# Patient Record
Sex: FEMALE | Race: OTHER | Employment: FULL TIME | URBAN - METROPOLITAN AREA
[De-identification: names, ages, dates, MRNs, and addresses within clinical notes are randomized per-mention and may not be internally consistent; named-entity substitution may affect disease eponyms.]

---

## 2018-01-15 ENCOUNTER — OFFICE VISIT (OUTPATIENT)
Dept: FAMILY MEDICINE CLINIC | Age: 23
End: 2018-01-15

## 2018-01-15 VITALS
HEART RATE: 72 BPM | RESPIRATION RATE: 14 BRPM | OXYGEN SATURATION: 100 % | TEMPERATURE: 98.3 F | WEIGHT: 101 LBS | HEIGHT: 56 IN | SYSTOLIC BLOOD PRESSURE: 122 MMHG | DIASTOLIC BLOOD PRESSURE: 81 MMHG | BODY MASS INDEX: 22.72 KG/M2

## 2018-01-15 DIAGNOSIS — Z3A.16 16 WEEKS GESTATION OF PREGNANCY: ICD-10-CM

## 2018-01-15 DIAGNOSIS — Z23 ENCOUNTER FOR IMMUNIZATION: ICD-10-CM

## 2018-01-15 DIAGNOSIS — Z32.01 PREGNANCY CONFIRMED BY POSITIVE URINE TEST: Primary | ICD-10-CM

## 2018-01-15 LAB
BILIRUB UR QL STRIP: NEGATIVE
GLUCOSE UR-MCNC: NEGATIVE MG/DL
HCG URINE, QL. (POC): POSITIVE
KETONES P FAST UR STRIP-MCNC: NEGATIVE MG/DL
PH UR STRIP: 6 [PH] (ref 4.6–8)
PROT UR QL STRIP: NEGATIVE
SP GR UR STRIP: 1.02 (ref 1–1.03)
UA UROBILINOGEN AMB POC: NORMAL (ref 0.2–1)
URINALYSIS CLARITY POC: NORMAL
URINALYSIS COLOR POC: YELLOW
URINE BLOOD POC: NORMAL
URINE LEUKOCYTES POC: NEGATIVE
URINE NITRITES POC: NEGATIVE
VALID INTERNAL CONTROL?: YES

## 2018-01-15 NOTE — PATIENT INSTRUCTIONS
Gigi el embarazo: Ejercicios  [During Pregnancy: Exercises]  Instrucciones de cuidado  Estos son algunos ejemplos de ejercicios que pueden ayudarle gigi el [facility]. Comience cada ejercicio lentamente. Reduzca la intensidad del ejercicio si Maddy Rimes a sentir dolor. Turner médico o el fisioterapeuta le dirán cuándo puede comenzar con estos ejercicios y cuáles funcionarán mejor para usted. Cómo se hacen los ejercicios  Rotación del pk    1. Siéntese en donna silla firme o póngase de pie derecha. 2. Con la barbilla nivelada, gire la jens hacia la derecha y [place] 15 y 27 segundos. 3. Gire la jens hacia la izquierda y mantenga la posición entre 15 y 27 segundos. 4. Repita de 2 a 4 veces hacia cada lado. Flexión del pk hacia adelante    1. Siéntese en donna silla firme o póngase de pie derecha. 2. Agache la jens hacia adelante. 3. Mantenga la posición entre 15 y 27 segundos. 4. Repita de 2 a 4 veces. Presión con la espalda    1. Coloque los pies entre 10 y 12 pulgadas (25 a 30 cm) de la pared. 2. Apoye la espalda plana en la pared y deslícese hacia abajo hasta que las rodillas estén ligeramente dobladas. 3. Presione la parte baja de la espalda contra la pared contrayendo los músculos del abdomen. 4. Mantenga la posición gigi 6 segundos y luego relaje el abdomen y deslícese hacia [place]. 5. Repita de 8 a 12 veces. Giro completo del cuerpo    1. Siéntese con las piernas cruzadas. 2. Lleve la mano izquierda hacia el pie derecho y coloque la mano derecha al lado para apoyarse. 3. Gire lentamente el torso hacia la derecha. 4. Cambie de dex y gire a la izquierda. 5. Repita de 2 a 4 veces. Balanceo de la pelvis    1. Póngase de dex y [address], y coloque las dex directamente bajo los hombros y [place]. 2. Inhale profundamente. Agache la jens y encorve la espalda hacia arriba, haciendo donna curva con la espalda en forma de letra \"C\".  Constantine Castanon posición gigi donna cuenta de 6.  3. Exhale lentamente y levante nuevamente la jens. Relájese manteniendo recta la espalda (no deje que se curve hacia el piso). Mantenga esta posición gigi donna cuenta de 6.  4. Repita fausto ejercicio 8 veces o según russell nivel de comodidad. Inclinación de la pelvis    Nota: Fausto ejercicio refuerza la parte baja de la espalda y la pelvis. Es para [employer] primeros 4 meses de [facility]. Después de james tiempo no se recomienda acostarse boca arriba, pues puede provocar problemas de flujo de [employer] bebé y en usted. 1. Acuéstese boca arriba. [address]. 3. Tense los músculos del abdomen y de las nalgas (glúteos). 4. Al mismo tiempo, gire suavemente la pelvis [place]. Al hacerlo debe aplanarse la curva de la espalda. 5. Mantenga la posición gigi 6 segundos y luego relájese. 6. Aumente poco a poco el número de inclinaciones que richie cada día, según russell nivel de comodidad. Estiramiento hacia atrás    1. Póngase a gatas con las rodillas separadas de 8 a 10 pulgadas (20 a 25 cm), las dex directamente bajo los hombros, y los brazos y la espalda rectos. 2. Manteniendo los brazos rectos, baje lentamente las nalgas (glúteos) hacia los talones y meta la jens hacia destin rodillas. Mantenga la posición entre 15 y 27 segundos. 3. Regrese lentamente a la posición inicial.  4. Repita de 2 a 4 veces. Inclinación hacia el frente    1. Siéntese cómodamente en donna silla, con los brazos relajados. 2. Dóblese lentamente hacia adelante, dejando que los brazos cuelguen frente a usted. Inclínese todo lo que pueda sin llegar a sentir incomodidad o presión en el abdomen. 3. Mantenga la posición entre 15 y 27 segundos y enderécese lentamente. 4. Repita fausto ejercicio de 2 a 4 veces o según russell nivel de comodidad. Levantamiento de la pierna en posición a gatas    1.  Póngase de dex y rodillas, y coloque las dex directamente bajo los hombros y enderece los brazos. 2. Tense los músculos del abdomen metiendo el ombligo hacia russell columna vertebral. Asegúrese de seguir respirando normalmente y no contenga la respiración. 3. Levante la rodilla izquierda y llévela hacia el codo. 4. Extienda lentamente russell pierna detrás de usted sin enderezarla por completo. Tenga cuidado de no dejar caer la cadera. Evite arquear la espalda. 5. Mantenga la pierna detrás de usted gigi unos 6 segundos. 6. Regrese a la posición inicial.  7. Elijah el mismo ejercicio con la otra pierna. 8. Repita de 8 a 12 veces con cada pierna. Sentarse en posición de yadi    1. Siéntese en el piso. 2. Acérquese los pies al cuerpo mientras cruza los tobillos. 3. Mantenga esta posición tanto tiempo krystin se sienta cómoda. Estiramiento sentada en el suelo    1. Siéntese en el suelo con la espalda recta, las piernas separadas unas 12 pulgadas (30 cm) y los pies relajados hacia afuera. 2. Estire las [employer] james, después enderécese. 3. Estire las dex hacia adelante, después enderécese. 4. Estire las [employer] derecho, después enderécese. 5. Mantenga cada estiramiento de 15 a 30 segundos. 6. Repita de 2 a 4 veces. La atención de seguimiento es donna parte clave de russell tratamiento y seguridad. Asegúrese de hacer y acudir a todas las citas, y llame a russell médico si está teniendo problemas. También es donna buena idea saber los resultados de los exámenes y mantener donna lista de los medicamentos que cyril. ¿Dónde puede encontrar más información en inglés? Princess Wright a DealExplorer.jose d Chaudhryra K605 en la búsqueda para aprender más acerca de \"Gigi el embarazo: Ejercicios. \"   © 9914-2838 Healthwise, Incorporated. Instrucciones de cuidado adaptadas bajo licencia por [employer] (which disclaims liability or warranty for this information). Estas instrucciones de cuidado son para usarlas con russell profesional clínico registrado.  Si tiene preguntas acerca de donna afección médica o de estas instrucciones, pregunte siempre a russell profesional de [employer]. Healthwise, Incorporated niega cualquier garantía o responsabilidad por russell uso de esta información. Versión del contenido: [id number]; Última revisión: 26 enero, 2012              Nutrición gigi el embarazo: Después de la [place]  [Nutrition During Pregnancy: After Your Visit]  Instrucciones de cuidado  Lostant en forma saludable gigi el embarazo es importante para usted y russell bebé. Puede ayudarla a sentirse emi y tener un embarazo y parto exitosos. Gigi el embarazo destin necesidades nutricionales aumentan. Aun si tiene excelentes hábitos alimentarios, russell médico le podría recomendar un multivitamínico para asegurarse de que reciba suficiente levi y ácido fólico.  Muchas mujeres embarazadas se preguntan cuánto deben aumentar de peso. En general, a las mujeres que antes del [facility] tenían un peso saludable, se les recomienda aumentar entre 25 y 28 libras (entre 11 y 16 kg). A las mujeres que tenían sobrepeso antes del [facility], se les suele recomendar que aumenten de 15 a 25 libras (de 7 a 11 kg). A las mujeres cuyo peso estaba debajo del peso normal antes del [facility], se les suele recomendar que aumenten de 28 a 40 libras (de 13 a 18 kg). Colaborará con russell médico para establecer un peso objetivo. Aumentar donna cantidad saludable de peso la ayudará a tener un bebé allyson. La atención de seguimiento es donna parte clave de russell tratamiento y seguridad. Asegúrese de hacer y acudir a todas las citas, y llame a russell médico si está teniendo problemas. También es donna buena idea saber los resultados de los exámenes y mantener donna lista de los medicamentos que cyril. ¿Cómo puede cuidarse en el hogar? · Coma abundantes frutas y verduras. Incluya donna variedad de verduras de hoja poppy oscuro, color naranja y [employer]. · Escoja panes, cereales y pastas de grano entero.  Los panes y las pastas de maren entero, [place] arroz integral y la vika son Radha Stains opciones. · Consuma 4 porciones o más de [place] y [address]. La [employer] o semidescremada, y el yogur y el queso sin grasa o con poca grasa son Radha Stains opciones. Si no puede consumir lácteos, obtenga el calcio que russell cuerpo necesita de productos enriquecidos con calcio, krystin jugo de [employer], [place] de soya y tofu. Entre otras qiu no lácteas de calcio se [employer] verduras de hojas verdes, krystin el brócoli, la col rizada, las hojas de [place], las hojas de nabo, el bok [place] y las coles de [place]. · Si come carne, escoja aquellos tipos que tengan menos grasa. [place] opciones son los bowden [place] de carne y el presley o el pavo sin piel. · No coma tiburón, pez laura, macarela sumit, blanquillo o atún garner. Estos alimentos tienen niveles altos de spencer, que es peligroso para el bebé. Puede comer hasta 12 onzas (340 gramos) de pescado o mariscos que tengan bajo nivel de spencer a la semana. Los camarones, el atún [employer] agua, el salmón enmanuel, el abadejo y el bagre son Radha Stains opciones. · [place] los embutidos, krystin el de [place], el jamón y la [place], a 165°F (75°C) antes de comerlos. [place] disminuye el riesgo de enfermarse a causa de un tipo de bacteria que puede encontrarse en los embutidos. · No coma quesos blandos sin pasteurizar, krystin queso \"brie\", feta, mozzarella fresca y [place]. Estas clases de quesos tienen donna bacteria que puede perjudicar al bebé. · Limite la cafeína. Si enriqueta café o té, no tome más de 1 taza al día. Las bebidas cola también contienen cafeína. · No kiran nada de alcohol. No se ha determinado que ninguna cantidad de alcohol sea baldwin gigi el embarazo. · No se ponga a dieta ni trate de bajar de peso. Por ejemplo, no siga donna dieta baja en carbohidratos. Si tiene sobrepeso al comienzo del [facility], russell médico colaborará con usted para manejar el aumento de [place].   · Infórmele a russell médico sobre todas las vitaminas y los suplementos que tome. ¿Cuándo debe pedir ayuda? Preste especial atención a los cambios en russell cristi y asegúrese de comunicarse con russell médico si tiene algún problema. ¿Dónde puede encontrar más información en inglés? Vaya a DealExplorer.be  Matthew Duck T287 en la búsqueda para aprender más acerca de \"Nutrición gigi el embarazo: Después de la [place]. \"   © 1476-5899 Healthwise, Incorporated. Instrucciones de cuidado adaptadas bajo licencia por Danielle Blake (which disclaims liability or warranty for this information). Estas instrucciones de cuidado son para usarlas con russell profesional clínico registrado. Si tiene preguntas acerca de donna afección médica o de estas instrucciones, pregunte siempre a russell profesional de [employer]. Healthwise, Incorporated niega cualquier garantía o responsabilidad por russell uso de esta información. Versión del contenido: [id number]; Última revisión: 21 marzo, 2012              Precauciones en [place]: Instrucciones de cuidado - [ Pregnancy Precautions: Care Instructions ]  Instrucciones de cuidado    No hay donna manera baldwin de prevenir el trabajo de parto antes de la fecha esperada (trabajo de parto prematuro) o de prevenir la mayoría de otros problemas en el [facility]. [place] hay cosas que puede hacer para aumentar las probabilidades de tener un embarazo saludable. Vaya a destin citas, siga los consejos de russell médico y cuídese. Coma emi y richie ejercicio (si russell médico lo permite). Y asegúrese de patsy abundante agua. La atención de seguimiento es donna parte clave de russell tratamiento y seguridad. Asegúrese de hacer y acudir a todas las citas, y llame a russell médico si está teniendo problemas. También es donna buena idea saber los resultados de los exámenes y mantener donna lista de los medicamentos que cyril. ¿Cómo puede cuidarse en el hogar? · Asegúrese de asistir a las citas prenatales. Russell médico le tomará la presión arterial en cada consulta.  [address] comprobará si tiene proteínas en turner orina. Tanto la presión arterial andrew krystin la presencia de proteínas en la orina son señales de preeclampsia. Esta afección puede ser peligrosa tanto para usted krystin para turner bebé. · Kiran abundantes líquidos, suficientes para que turner orina sea de color amarillo polo o transparente krystin el agua. La deshidratación puede causar contracciones. Si tiene [employer], el corazón o el hígado y tiene que [place]'s líquidos, hable con turner médico antes de aumentar turner consumo. · Notifique a turner médico de inmediato si presenta cualquier síntoma de infección, tales krystin:  ¨ Ardor cuando orina. ¨ Flujo con mal olor de la vagina. ¨ Comezón en la vagina. ¨ Fiebre sin explicación. ¨ Dolor o sensibilidad inusual en el útero o la parte baja del abdomen. · Aliméntese en forma equilibrada. Incluya muchos alimentos que lukas ricos en calcio y levi. ¨ Entre los alimentos ricos en calcio se incluyen la [place], el queso, el yogur, [place] y el brócoli. ¨ Entre los alimentos ricos en levi se incluyen las bessy mckeon, los [place], las aves, los [place], los frijoles, las uvas pasas, el pan de grano integral y las verduras de hojas verdes. · No fume. Si necesita ayuda para dejar de fumar, hable con turner médico sobre programas y medicamentos para dejar de fumar. Estos pueden aumentar destin probabilidades de dejar el hábito para siempre. · No kiran alcohol ni use drogas ilegales. · Siga las instrucciones de turner médico acerca de la [place]. Turner médico le dirá cuánto ejercicio puede hacer. · Pregúntele a turner médico si puede tener [employer]. Si usted está en riesgo de tener trabajo de [place], turner médico podría pedirle que no tenga relaciones sexuales. · [place] precauciones para prevenir las caídas. Noel el embarazo las articulaciones están más sueltas y se tiene menos equilibrio.  Los deportes tales krystin el ciclismo, el esquí o el patinaje en línea pueden aumentar el riesgo de caídas. Y no monte a neelima, katharina en motocicleta, richie clavados, richie esquí acuático, bucee, ni salte en paracaídas mientras está embarazada. · Evite calentarse demasiado. No use saunas ni bañeras de hidromasaje. Evite la exposición al sol en climas calientes por mucho tiempo. [place] acetaminofén (Tylenol) para bajar donna fiebre andrew. · No tome medicamentos de venta charles, productos herbarios ni suplementos sin hablar ventura con russell médico o farmacéutico.  ¿Cuándo debe pedir ayuda? Llame al 911 en cualquier momento que considere que necesita atención de [place]. Por ejemplo, llame si:  ? · Se desmayó (perdió el conocimiento). ? · Tiene sangrado vaginal intenso. ? · Tiene dolor intenso en el vientre o la pelvis. ? · Le sale abundante líquido o gotea de la vagina y sabe o anaid que el cordón umbilical se está saliendo a russell vagina. Si esto sucede, arrodíllese de inmediato, de janna forma que destin nalgas estén más altas que russell jens. [place] disminuirá la presión sobre el cordón umbilical hasta que llegue la [facility]. ?Llame a russell médico ahora mismo o busque atención médica inmediata si:  ? · Tiene señales de preeclampsia, tales krystin:  ¨ Se le hinchan de manera repentina la amee, las dex o los pies. ¨ Problemas nuevos con la visión (krystin oscurecimiento de la visión o visión borrosa). ¨ Dolor de jens intenso. ? · Tiene cualquier sangrado vaginal.   ? · Tiene dolor abdominal o cólicos. ? · Tiene fiebre. ? · Ha tenido contracciones regulares (con o sin dolor) por [place]. [place] significa que tiene 8 o más contracciones en 1 hora o que tiene 4 contracciones o más en 20 minutos después de [place] de posición y patsy líquidos. ? · Tiene donna pérdida repentina de líquido por la vagina. ? · Tiene dolor en la parte baja de la espalda o presión en la pelvis que no desaparece.    ? · Nota que russell bebé ha dejado de moverse o se mueve mucho menos de lo normal.   ?Preste especial atención a los cambios en russell cristi y asegúrese de comunicarse con russell médico si tiene algún problema. ¿Dónde puede encontrar más información en inglés? Camelia rodas http://judah.info/. Breanne Daiw S884 en la búsqueda para aprender más acerca de \"Precauciones en el embarazo: Instrucciones de cuidado - [ Pregnancy Precautions: Care Instructions ]. \"  Revisado: 16 marzo, 2017  Versión del contenido: 11.4  © 8542-9736 Healthwise, Incorporated. Las instrucciones de cuidado fueron adaptadas bajo licencia por [employer] (which disclaims liability or warranty for this information). Si usted tiene [place] afección médica o sobre estas instrucciones, siempre pregunte a russell profesional de cristi. Healthwise, Incorporated niega toda garantía o responsabilidad por russell uso de esta información. Weeks 14 to 18 of Your Pregnancy: Care Instructions  Your Care Instructions    During this time, you may start to \"show,\" so that you look pregnant to people around you. You may also notice some changes in your skin, such as itchy spots on your palms or acne on your face. Your baby is now able to pass urine, and your baby's first stool (meconium) is starting to collect in his or her intestines. Hair is also beginning to grow on your baby's head. At your next visit, between weeks 18 and 20, your doctor may do an ultrasound test. The test allows your doctor to check for certain problems. Your doctor can also tell the sex of your baby. This is a good time to think about whether you want to know whether your baby is a boy or a girl. Talk to your doctor about getting a flu shot to help keep you healthy during your pregnancy. As your pregnancy moves along, it is common to worry or feel anxious. Your body is changing a lot. And you are thinking about giving birth, the health of your baby, and becoming a parent. You can learn to cope with any anxiety and stress you feel.   Follow-up care is a key part of your treatment and safety. Be sure to make and go to all appointments, and call your doctor if you are having problems. It's also a good idea to know your test results and keep a list of the medicines you take. How can you care for yourself at home? ?Reduce stress  ? · Ask for help with cooking and housekeeping. ? · Figure out who or what causes your stress. Avoid these people or situations as much as possible. ? · Relax every day. Taking 10- to 15-minute breaks can make a big difference. Take a walk, listen to music, or take a warm bath. ? · Learn relaxation techniques at prenatal or yoga class. Or buy a relaxation tape. ? · List your fears about having a baby and becoming a parent. Share the list with someone you trust. Decide which worries are really small, and try to let them go. Exercise  ? · If you did not exercise much before pregnancy, start slowly. Walking is best. Marjean Aschoff yourself, and do a little more every day. ? · Brisk walking, easy jogging, low-impact aerobics, water aerobics, and yoga are good choices. Some sports, such as scuba diving, horseback riding, downhill skiing, gymnastics, and water skiing, are not a good idea. ? · Try to do at least 2½ hours a week of moderate exercise, such as a fast walk. One way to do this is to be active 30 minutes a day, at least 5 days a week. It's fine to be active in blocks of 10 minutes or more throughout your day and week. ? · Wear loose clothing. And wear shoes and a bra that provide good support. ? · Warm up and cool down to start and finish your exercise. ? · If you want to use weights, be sure to use light weights. They reduce stress on your joints. ?Stay at the best weight for you  ? · Experts recommend that you gain about 1 pound a month during the first 3 months of your pregnancy. ? · Experts recommend that you gain about 1 pound a week during your last 6 months of pregnancy, for a total weight gain of 25 to 35 pounds.    ? · If you are underweight, you will need to gain more weight (about 28 to 40 pounds). ? · If you are overweight, you may not need to gain as much weight (about 15 to 25 pounds). ? · If you are gaining weight too fast, use common sense. Exercise every day, and limit sweets, fast foods, and fats. Choose lean meats, fruits, and vegetables. ? · If you are having twins or more, your doctor may refer you to a dietitian. Where can you learn more? Go to http://radha-doug.info/. Enter B901 in the search box to learn more about \"Weeks 14 to 18 of Your Pregnancy: Care Instructions. \"  Current as of: March 16, 2017  Content Version: 11.4  © 9555-2143 [employer]. Care instructions adapted under license by [employer] (which disclaims liability or warranty for this information). If you have questions about a medical condition or this instruction, always ask your healthcare professional. [place] [address] any warranty or liability for your use of this information. Exercise During Pregnancy: Care Instructions  Your Care Instructions    Exercise is good for healthy pregnant women. It can relieve back pain, swelling, and other discomforts of pregnancy. It also prepares your muscles for childbirth. And exercise can improve your energy level and help you sleep better. If your doctor recommends it, get more exercise. Walking is a good choice. Bit by bit, increase the amount you walk every day. Try for at least 30 minutes on most days of the week. But if you do not already exercise, be sure to talk with your doctor before you start a new exercise program. Try exercise classes for pregnant women. Doctors do not recommend contact sports during pregnancy. Follow-up care is a key part of your treatment and safety. Be sure to make and go to all appointments, and call your doctor if you are having problems.  It's also a good idea to know your test results and keep a list of the medicines you take. How can you care for yourself at home? · Talk with your doctor about the right kind of exercise for each stage of pregnancy. · Listen to your body to know if your exercise is at a safe level. ¨ Do not become overheated while you exercise. ¨ If you feel tired, take it easy. You might walk instead of run. ¨ If you are used to strenuous exercise, pay attention to changes in your body that mean it is time to slow down. ¨ High body temperature can be harmful to your baby. So if you want to use a sauna or hot tub, be sure to talk to your doctor about how to use them safely. · If you exercised before getting pregnant, you should be able to keep up your routine early in your pregnancy. That might include running and aerobics. Later, you may want to switch to swimming or walking. · Eat a small snack or drink juice 15 to 30 minutes before you exercise. · Eat a healthy diet. Make sure it includes plenty of beans, peas, and leafy green vegetables. You may need to increase how much you eat to get extra energy for exercise. · Drink plenty of fluids before, during, and after exercise. · Avoid contact sports, such as soccer and basketball. Also avoid scuba diving, exercise in high altitude (above 6,000 feet), and horseback riding. · Do not get overtired while you exercise. You should be able to talk while you work out. · After your fourth month of pregnancy, avoid exercises (such as sit-ups and some yoga poses) that require you to lie flat on your back on a hard surface. · Try swimming and brisk walking during all your pregnancy. · Get plenty of rest. You may be very tired while you are pregnant. Where can you learn more? Go to http://radha-doug.info/. Enter E551 in the search box to learn more about \"Exercise During Pregnancy: Care Instructions. \"  Current as of: March 16, 2017  Content Version: 11.4  © 1804-9078 Healthwise, Incorporated.  Care instructions adapted under license by [address] (which disclaims liability or warranty for this information). If you have questions about a medical condition or this instruction, always ask your healthcare professional. [address] any warranty or liability for your use of this information.

## 2018-01-15 NOTE — PROGRESS NOTES
Subjective:   David Peres is a 23 y.o. female who is being seen today for possible pregnancy due to missed menses. LMP 9/24/17. Hx of irregular menses according to patient. She has never been pregnant. She has been sexually active with her boyfriend of 3 years. Not using contraception. This would be an unplanned pregnancy. Pt does not use tobacco products, no EtOH, no drug use. She has a reported healthy diet. Does not drink water regularly, perhaps 3 cups at best.     She reports no medical hx or taking any medications. Negative family hx. Not taking prenatal vitaims    Allergies- reviewed: Allergies   Allergen Reactions    Penicillins Hives         Medications- reviewed:   Current Outpatient Prescriptions   Medication Sig    prenatal vit-calcium-iron-fa (PRENATAL PLUS WITH CALCIUM) 27 mg iron- 1 mg tab Take 1 Tab by mouth daily. No current facility-administered medications for this visit. Past Medical History- reviewed:  History reviewed. No pertinent past medical history. Past Surgical History- reviewed:   History reviewed. No pertinent surgical history. Social History- reviewed:  Social History     Social History    Marital status: SINGLE     Spouse name: N/A    Number of children: N/A    Years of education: N/A     Occupational History    Not on file.      Social History Main Topics    Smoking status: Never Smoker    Smokeless tobacco: Never Used    Alcohol use No    Drug use: No    Sexual activity: Yes     Other Topics Concern    Not on file     Social History Narrative    No narrative on file         Immunizations- reviewed:   Immunization History   Administered Date(s) Administered    Influenza Vaccine (Quad) PF 01/15/2018     Flu: will get today     ROS:  General: No fevers or chills  GI: No abdominal pain, nausea, vomiting  : No vaginal bleeding      Objective:     Visit Vitals    /81 (BP 1 Location: Right arm, BP Patient Position: Sitting)    Pulse 72    Temp 98.3 °F (36.8 °C) (Oral)    Resp 14    Ht 4' 8\" (1.422 m)    Wt 101 lb (45.8 kg)    LMP 09/20/2017    SpO2 100%    BMI 22.64 kg/m2       Physical Exam:  GENERAL APPEARANCE: alert, well appearing, in no apparent distress  HEAD: normocephalic, atraumatic  LUNGS: clear to auscultation, no wheezes, rales or rhonchi, symmetric air entry  HEART: regular rate and rhythm, no murmurs  ABDOMEN: fundus soft, nontender 16 cm and   BACK: no CVA tenderness    Labs:  Urine pregnancy test reviewed  Recent Results (from the past 12 hour(s))   AMB POC URINE PREGNANCY TEST, VISUAL COLOR COMPARISON    Collection Time: 01/15/18  8:15 AM   Result Value Ref Range    VALID INTERNAL CONTROL POC Yes     HCG urine, Ql. (POC) Negative Negative   AMB POC URINALYSIS DIP STICK AUTO W/O MICRO    Collection Time: 01/15/18  9:19 AM   Result Value Ref Range    Color (UA POC) Yellow     Clarity (UA POC) Slightly Cloudy     Glucose (UA POC) Negative Negative    Bilirubin (UA POC) Negative Negative    Ketones (UA POC) Negative Negative    Specific gravity (UA POC) 1.025 1.001 - 1.035    Blood (UA POC) Trace Negative    pH (UA POC) 6.0 4.6 - 8.0    Protein (UA POC) Negative Negative    Urobilinogen (UA POC) 1 mg/dL 0.2 - 1    Nitrites (UA POC) Negative Negative    Leukocyte esterase (UA POC) Negative Negative         Assessment:       ICD-10-CM ICD-9-CM    1. Pregnancy confirmed by positive urine test Z32.01 V72.42 AMB POC URINE PREGNANCY TEST, VISUAL COLOR COMPARISON      INFLUENZA VIRUS VAC QUAD,SPLIT,PRESV FREE SYRINGE IM      prenatal vit-calcium-iron-fa (PRENATAL PLUS WITH CALCIUM) 27 mg iron- 1 mg tab      AMB POC URINALYSIS DIP STICK AUTO W/O MICRO   2.  Encounter for immunization Z23 V03.89 AMB POC URINE PREGNANCY TEST, VISUAL COLOR COMPARISON      INFLUENZA VIRUS VAC QUAD,SPLIT,PRESV FREE SYRINGE IM      prenatal vit-calcium-iron-fa (PRENATAL PLUS WITH CALCIUM) 27 mg iron- 1 mg tab      AMB POC URINALYSIS DIP STICK AUTO W/O MICRO   3. 16 weeks gestation of pregnancy Z3A.16 V22.2 AMB POC URINE PREGNANCY TEST, VISUAL COLOR COMPARISON      INFLUENZA VIRUS VAC QUAD,SPLIT,PRESV FREE SYRINGE IM      prenatal vit-calcium-iron-fa (PRENATAL PLUS WITH CALCIUM) 27 mg iron- 1 mg tab      AMB POC URINALYSIS DIP STICK AUTO W/O MICRO       Pregnancy in 22 yo  at 12 and 1/7 weeks by LMP. COREY: 2018. Plan:   · Return to clinic in 4 days(s) with Dr. Brittnee Ramachandran (pt prefers a female physician) for initial prenatal visit and perhaps dating US. I was unable to schedule this pt for dating US for tomorrow as schedule is full. · Prenatal vitamins script given  · Influenza vaccine given  · Precautions given regarding proper water intake and pregnancy. Orders Placed This Encounter    INFLUENZA VIRUS VACCINE QUADRIVALENT, PRESERVATIVE FREE SYRINGE ([id number])    AMB POC URINE PREGNANCY TEST, VISUAL COLOR COMPARISON    AMB POC URINALYSIS DIP STICK AUTO W/O MICRO    prenatal vit-calcium-iron-fa (PRENATAL PLUS WITH CALCIUM) 27 mg iron- 1 mg tab     Sig: Take 1 Tab by mouth daily. Dispense:  30 Tab     Refill:  11         I have discussed the diagnosis with the patient and the intended plan as seen in the above orders. The patient verbalized understanding of the treatment plan and agrees with the plan. The patient has received an after-visit summary and questions were answered concerning future plans. I have discussed medication side effects and warnings with the patient as well. Informed pt to return to the office or go to the ER if she experiences vaginal bleeding, vaginal discharge, leaking of fluid, pelvic cramping.     Discussed pt with Dr. Hayden Busch MD      Unknown DO [place]  Family Medicine Resident

## 2018-01-15 NOTE — MR AVS SNAPSHOT
Visit Information Dalila Bingham Personal Médico Departamento Teléfono del Dep. Número de visita 1/15/2018 10:45 AM Donavan Alfaro, DO [facility] [phone] [id number] Your Appointments 1/15/2018 10:45 AM  
New Patient with Donavan Alfaro, DO [facility] ([facility]) Appt Note: NP ok, per Philip Uribe, confirm pregnancy [facility]0 [facility] [place] [address] [facility]  
[phone]  
  
   
 [address] [place] [address] 3 [facility] [phone] [id number] Upcoming Health Maintenance Date Due Hepatitis A Peds Age 1-18 (1 of 2 - Standard Series) 12/24/1998 DTaP/Tdap/Td series (1 - Tdap) 12/24/2004 HPV AGE [id number] (1 of 3 - Female 3 Dose Series) 12/24/2008 Influenza Age 5 to Adult 8/1/2017 Alergias  Review Complete El: 1/15/2018 Por: Sandrine Quezada A partir del:  1/15/2018 Intensidad Anotado Tipo de reacción [employer] Penicillins  01/15/2018    Hives Vacunas actuales Veliaclark Vazquez Influenza Vaccine (Quad) PF  Incomplete No revisadas esta visita You Were Diagnosed With   
  
 [place] Encounter for immunization    -  Primary ICD-10-CM: V62 ICD-9-CM: V03.89 Partes vitales PS Pulso Temperatura Resp Hurley ( percentil de crecimiento) Peso (percentil de crecimiento) 122/81 (BP 1 Location: Right arm, BP Patient Position: Sitting) 72 98.3 °F (36.8 °C) (Oral) 14 4' 8\" (1.422 m) 101 lb (45.8 kg) LMP (última chadwick) SpO2 BMI (IMC) Estado obstétrico Estatus de tabaquísmo 09/20/2017 100% 22.64 kg/m2 Pregnant Never Smoker Historial de signos vitales BMI and BSA Data Body Mass Index Body Surface Area  
 22.64 kg/m 2 1.35 m 2 Turner lista de medicamentos actualizada Aviso  As of 1/15/2018  9:42 AM  
 No se le ha recetado ningún medicamento. Hicimos lo siguiente AMB POC URINE PREGNANCY TEST, VISUAL COLOR COMPARISON [42317 CPT(R)] INFLUENZA VIRUS VAC QUAD,SPLIT,PRESV FREE SYRINGE IM [id number] CPT(R)] Instrucciones para el Paciente Gigi el embarazo: Ejercicios [During Pregnancy: Exercises] Instrucciones de cuidado Estos son Savannah Gault de ejercicios que pueden ayudarle gigi el [facility]. Comience cada ejercicio lentamente. Reduzca la intensidad del ejercicio si Ingrid Donate a sentir dolor. Turner médico o el fisioterapeuta le dirán cuándo puede comenzar con estos ejercicios y cuáles funcionarán mejor para usted. Cómo se hacen los ejercicios Rotación del pk 1. Siéntese en donna silla firme o póngase de pie derecha. 2. Con la barbilla nivelada, gire la jens hacia la derecha y Mc 15 y 27 segundos. 3. Gire la jens hacia la izquierda y mantenga la posición entre 15 y 27 segundos. 4. Repita de 2 a 4 veces hacia cada lado. Flexión del pk hacia adelante 1. Siéntese en donna silla firme o póngase de pie derecha. 2. Agache la jens hacia adelante. 3. Mantenga la posición entre 15 y 27 segundos. 4. Repita de 2 a 4 veces. Presión con Jadonalybobo Carlita 1. Coloque los pies entre 10 y 12 pulgadas (25 a 30 cm) de la pared. 2. Apoye la espalda plana en la pared y deslícese hacia abajo hasta que las rodillas estén ligeramente dobladas. 3. Presione la parte baja de la espalda contra la pared contrayendo los músculos del abdomen. 4. Mantenga la posición gigi 6 segundos y luego relaje el abdomen y deslícese hacia [place]. 5. Repita de 8 a 12 veces. Giro completo del cuerpo 1. Siéntese con las piernas cruzadas. 2. Lleve la mano izquierda hacia el pie derecho y coloque la mano derecha al lado para apoyarse. 3. Gire lentamente el torso hacia la derecha. 4. Cambie de dex y gire a la izquierda. 5. Repita de 2 a 4 veces. Balanceo de la pelvis 1.  Póngase de dex y rodillas, y coloque las dex directamente bajo los hombros y [place]. 2. Inhale profundamente. Agache la jens y encorve la espalda hacia arriba, haciendo donna curva con la espalda en forma de letra \"C\". Mantenga esta posición gigi donna cuenta de 6. 
3. Exhale lentamente y levante nuevamente la jens. Relájese manteniendo recta la espalda (no deje que se curve hacia el piso). Mantenga esta posición gigi donna cuenta de 6. 
4. Repita fausto ejercicio 8 veces o según russell nivel de comodidad. Inclinación de la pelvis Nota: Fausto ejercicio refuerza la parte baja de la espalda y la pelvis. Es para [employer] primeros 4 meses de [facility]. Después de james tiempo no se recomienda acostarse boca arriba, pues puede provocar problemas de flujo de [employer] bebé y en usted. 1. Acuéstese boca arriba. [address]. 3. Tense los músculos del abdomen y de las nalgas (glúteos). 4. Al mismo tiempo, gire suavemente la pelvis Casper Jah. Al hacerlo debe aplanarse la curva de la espalda. 5. Mantenga la posición gigi 6 segundos y luego relájese. 6. Aumente poco a poco el número de inclinaciones que richie cada día, según russell nivel de comodidad. Estiramiento hacia atrás 1. Póngase a gatas con las rodillas separadas de 8 a 10 pulgadas (20 a 25 cm), las dex directamente bajo los hombros, y los brazos y la espalda rectos. 2. Manteniendo los brazos rectos, baje lentamente las nalgas (glúteos) hacia los talones y meta la jens hacia destin rodillas. Mantenga la posición entre 15 y 27 segundos. 3. Regrese lentamente a la posición inicial. 
4. Repita de 2 a 4 veces. Inclinación hacia el frente 1. Siéntese cómodamente en donna silla, con los brazos relajados. 2. Dóblese lentamente hacia adelante, dejando que los brazos cuelguen frente a usted. Inclínese todo lo que pueda sin llegar a sentir incomodidad o presión en el abdomen. 3. Mantenga la posición entre 15 y 27 segundos y enderécese lentamente. 4. Repita fausto ejercicio de 2 a 4 veces o según russell nivel de comodidad. Levantamiento de la pierna en posición a gatas 1. Póngase de dex y rodillas, y coloque las dex directamente bajo los hombros y [employer] brazos. 2. Tense los músculos del abdomen metiendo el ombligo hacia russell columna vertebral. Asegúrese de seguir respirando normalmente y no contenga la respiración. 3. Levante la rodilla izquierda y llévela hacia el codo. 4. Extienda lentamente russell pierna detrás de usted sin enderezarla por completo. Tenga cuidado de no dejar caer la cadera. Evite arquear la espalda. 5. Mantenga la pierna detrás de usted gigi unos 6 segundos. 6. Regrese a la posición inicial. 
7. Elijah el mismo ejercicio con la otra pierna. 8. Repita de 8 a 12 veces con cada pierna. Sentarse en posición de yadi 1. Siéntese en el piso. 2. Acérquese los pies al cuerpo mientras cruza los tobillos. 3. Mantenga esta posición tanto tiempo krystin se sienta cómoda. Estiramiento sentada en el suelo 1. Siéntese en el suelo con la espalda recta, las piernas separadas unas 12 pulgadas (30 cm) y los pies relajados hacia afuera. 2. Estire las [employer] james, después enderécese. 3. Estire las dex hacia adelante, después enderécese. 4. Estire las [employer] derecho, después enderécese. 5. Mantenga cada estiramiento de 15 a 30 segundos. 6. Repita de 2 a 4 veces. La atención de seguimiento es donna parte clave de russell tratamiento y seguridad. Asegúrese de hacer y acudir a todas las citas, y llame a russell médico si está teniendo problemas. También es donna buena idea saber los resultados de los exámenes y mantener donna lista de los medicamentos que cyril. Dónde puede encontrar más información en inglés? Everardo Ogles a DealExplorer.be Alina Knife C990 en la búsqueda para aprender más acerca de \"Gigi el embarazo: Ejercicios. \"  
© 7491-5513 Healthwise, Incorporated.  Instrucciones de cuidado adaptadas bajo licencia por Jose Luis Robles (which disclaims liability or warranty for this information). Estas instrucciones de cuidado son para usarlas con russell profesional clínico registrado. Si tiene preguntas acerca de donna afección médica o de estas instrucciones, pregunte siempre a russell profesional de [employer]. Healthwise, Incorporated niega cualquier garantía o responsabilidad por russell uso de esta información. Versión del contenido: [id number]; Última revisión: 26 enero, 2012 Nutrición gigi el embarazo: Después de la Amelia [Nutrition During Pregnancy: After Your Visit] Instrucciones de cuidado [place] en forma saludable gigi el embarazo es importante para usted y russell bebé. Puede ayudarla a sentirse emi y tener un embarazo y parto exitosos. Gigi el embarazo destin necesidades nutricionales aumentan. Aun si tiene excelentes hábitos alimentarios, russell médico le podría recomendar un multivitamínico para asegurarse de que reciba suficiente levi y ácido fólico. 
Muchas mujeres embarazadas se preguntan cuánto deben aumentar de peso. En general, a las mujeres que antes del [facility] tenían un peso saludable, se les recomienda aumentar entre 25 y 28 libras (entre 11 y 16 kg). A las mujeres que tenían sobrepeso antes del [facility], se les suele recomendar que aumenten de 15 a 25 libras (de 7 a 11 kg). A las mujeres cuyo peso estaba debajo del peso normal antes del [facility], se les suele recomendar que aumenten de 28 a 40 libras (de 13 a 18 kg). Colaborará con russell médico para establecer un peso objetivo. Aumentar donna cantidad saludable de peso la ayudará a tener un bebé allyson. La atención de seguimiento es donna parte clave de russell tratamiento y seguridad. Asegúrese de hacer y acudir a todas las citas, y llame a russell médico si está teniendo problemas. También es donna buena idea saber los resultados de los exámenes y mantener donna lista de los medicamentos que cyril. Cómo puede cuidarse en el hogar? · Coma abundantes frutas y verduras. Incluya donna variedad de verduras de hoja poppy oscuro, color naranIDRI (Infectious Disease Research Institute) y [employer]. · Escoja panes, cereales y pastas de grano entero. Los panes y las pastas de maren entero, el arroz integral y la vika son Radha Stains opciones. · Consuma 4 porciones o más de [place] y [address] [place]. La [employer] o semidescremada, y el yogur y el queso sin grasa o con poca grasa son Radha Stains opciones. Si no puede consumir lácteos, obtenga el calcio que russell cuerpo necesita de productos enriquecidos con calcio, krystin jugo de [place], [place] de soya y tofu. Entre otras qiu no lácteas de calcio se [employer] verduras de hojas verdes, krystin el brócoli, la col rizada, las hojas de [place], las hojas de nabo, el bok Amelia y las coles de [place]. · Si come carne, escoja aquellos tipos que tengan menos grasa. [place] Johny opciones son los bowden [place] de carne y el presley o el pavo sin piel. · No coma tiburón, pez laura, macarela sumit, blanquillo o atún garner. Estos alimentos tienen niveles altos de spencer, que es peligroso para el bebé. Puede comer hasta 12 onzas (340 gramos) de pescado o mariscos que tengan bajo nivel de spencer a la semana. Los camarones, el atún [employer] agua, el salmón enmanuel, el abadejo y el bagre son Radha Stains opciones. · Conejos los embutidos, krystin el de Chris, el jamón y la [place], a 165°F (75°C) antes de comerlos. [place] disminuye el riesgo de enfermarse a causa de un tipo de bacteria que puede encontrarse en los embutidos. · No coma quesos blandos sin pasteurizar, krystin queso \"brie\", feta, mozzarella fresca y Kay. Estas clases de quesos tienen donna bacteria que puede perjudicar al bebé. · Limite la cafeína. Si enriqueta café o té, no tome más de 1 taza al día. Las bebidas cola también contienen cafeína. · No kiran nada de alcohol. No se ha determinado que ninguna cantidad de alcohol sea baldwin gigi el embarazo. · No se ponga a dieta ni trate de bajar de peso. Por ejemplo, no siga donna dieta baja en carbohidratos. Si tiene sobrepeso al comienzo del [facility], russell médico colaborará con usted para manejar el aumento de [place]. · Infórmele a russell médico sobre todas las vitaminas y los suplementos que tome. Cuándo debe pedir ayuda? Preste especial atención a los cambios en russell cristi y asegúrese de comunicarse con russell médico si tiene algún problema. Dónde puede encontrar más información en inglés? Everardo Gonzales a DealExplorer.be Alina Green V255 en la búsqueda para aprender más acerca de \"Nutrición gigi el embarazo: Después de la Amelia. \"  
© 0301-8199 Healthwise, Incorporated. Instrucciones de cuidado adaptadas bajo licencia por [facility] (which disclaims liability or warranty for this information). Estas instrucciones de cuidado son para usarlas con russell profesional clínico registrado. Si tiene preguntas acerca de donna afección médica o de estas instrucciones, pregunte siempre a russell profesional de [employer]. Healthwise, Incorporated niega cualquier garantía o responsabilidad por russell uso de esta información. Versión del contenido: [id number]; Última revisión: 20 marzo, 2012 Precauciones en el embarazo: Instrucciones de cuidado - [ Pregnancy Precautions: Care Instructions ] Instrucciones de cuidado No hay donna manera baldwin de prevenir el trabajo de parto antes de la fecha esperada (trabajo de parto prematuro) o de prevenir la mayoría de otros problemas en el [facility]. Tripp hay cosas que puede hacer para aumentar las probabilidades de tener un embarazo saludable. Vaya a destin citas, siga los consejos de russell médico y cuídese. Coma emi y richie ejercicio (si russell médico lo permite). Y asegúrese de patsy abundante agua. La atención de seguimiento es donna parte clave de russell tratamiento y seguridad.  Asegúrese de hacer y acudir a todas las citas, y llame a russell médico si está teniendo problemas. También es donna buena idea saber los resultados de los exámenes y mantener donna lista de los medicamentos que cyril. Cómo puede cuidarse en el hogar? · Asegúrese de asistir a las citas prenatales. Russell médico le tomará la presión arterial en cada consulta. Russell médico también comprobará si tiene proteínas en russell orina. Tanto la presión arterial andrew krystin la presencia de proteínas en la orina son señales de preeclampsia. Esta afección puede ser peligrosa tanto para usted krystin para russell bebé. · Mary abundantes líquidos, suficientes para que russell orina sea de color amarillo polo o transparente krystin el agua. La deshidratación puede causar contracciones. Si tiene [employer], el corazón o el hígado y tiene que Grayson's líquidos, hable con russell médico antes de aumentar russell consumo. · Notifique a russell médico de inmediato si presenta cualquier síntoma de infección, tales krystin: ¨ Ardor cuando orina. ¨ Flujo con mal olor de la vagina. ¨ Comezón en la vagina. ¨ Fiebre sin explicación. ¨ Dolor o sensibilidad inusual en el útero o la parte baja del abdomen. · Aliméntese en forma equilibrada. Incluya muchos alimentos que lukas ricos en calcio y levi. ¨ Entre los alimentos ricos en calcio se incluyen la AT[employer], el queso, el yogur, Karin Priestly y el brócoli. ¨ Entre los alimentos ricos en levi se incluyen las bessy mckeon, los [place], las aves, los [place], los frijoles, las uvas pasas, el pan de grano integral y las verduras de hojas verdes. · No fume. Si necesita ayuda para dejar de fumar, hable con russell médico sobre programas y medicamentos para dejar de fumar. Estos pueden aumentar destin probabilidades de dejar el hábito para siempre. · No mary alcohol ni use drogas ilegales. · Siga las instrucciones de russell médico acerca de la [place]. Russell médico le dirá cuánto ejercicio puede hacer. · Pregúntele a russell médico si puede tener [address] [place] [address] [address]  Si usted [employer] en riesgo de tener trabajo de parto prematuro, russell médico podría pedirle que no tenga [employer]. · Gray precauciones para prevenir las caídas. Noel el embarazo las articulaciones están más sueltas y se tiene menos equilibrio. Los deportes tales krystin el ciclismo, el esquí o el patinaje en línea pueden aumentar el riesgo de caídas. Y no monte a neelima, katharina en motocicleta, richie clavados, richie esquí acuático, bucee, ni salte en paracaídas mientras está embarazada. · Evite calentarse demasiado. No use saunas ni bañeras de hidromasaje. Evite la exposición al sol en climas calientes por mucho tiempo. Gray acetaminofén (Tylenol) para bajar donna fiebre andrew. · No tome medicamentos de venta charles, productos herbarios ni suplementos sin hablar ventura con russell médico o farmacéutico. 

Cuándo debe pedir ayuda? Llame al 911 en cualquier momento que considere que necesita atención de [place]. Por ejemplo, llame si: 
? · Se desmayó (perdió el conocimiento). ? · Tiene sangrado vaginal intenso. ? · Tiene dolor intenso en el vientre o la pelvis. ? · Le sale abundante líquido o gotea de la vagina y sabe o anaid que el cordón umbilical se está saliendo a russell vagina. Si esto sucede, arrodíllese de inmediato, de janna forma que destin nalgas estén más altas que russell jens. [place] disminuirá la presión sobre el cordón umbilical hasta que llegue la [facility]. ?Llame a russell médico ahora mismo o busque atención médica inmediata si: 
? · Tiene señales de preeclampsia, tales krystin: ¨ Se le hinchan de manera repentina la amee, las dex o los pies. ¨ Problemas nuevos con la visión (krystin oscurecimiento de la visión o visión borrosa). ¨ Dolor de jens intenso. ? · Tiene cualquier sangrado vaginal.  
? · Tiene dolor abdominal o cólicos. ? · Tiene fiebre. ? · Ha tenido contracciones regulares (con o sin dolor) por [place].  [place] significa que tiene 8 o más contracciones en 1 hora o que tiene 4 contracciones o más en 20 minutos después de cambiar de posición y patsy líquidos. ? · Tiene donna pérdida repentina de líquido por la vagina. ? · Tiene dolor en la parte baja de la espalda o presión en la pelvis que no desaparece. ? · Nota que russell bebé ha dejado de moverse o se mueve mucho menos de lo normal. ?Preste especial atención a los cambios en russell cristi y asegúrese de comunicarse con russell médico si tiene algún problema. Dónde puede encontrar más información en inglés? Robin Dimas a http://radha-doug.info/. Giselle Bond X520 en la búsqueda para aprender más acerca de \"Precauciones en el embarazo: Instrucciones de cuidado - [ Pregnancy Precautions: Care Instructions ]. \" 
Revisado: 16 marzo, 2017 Versión del contenido: 11.4 © 8942-6028 Healthwise, Incorporated. Las instrucciones de cuidado fueron adaptadas bajo licencia por [employer] (which disclaims liability or warranty for this information). Si usted tiene [place] afección médica o sobre estas instrucciones, siempre pregunte a russell profesional de cristi. Healthwise, Incorporated niega toda garantía o responsabilidad por russell uso de esta información. Weeks 14 to 18 of Your Pregnancy: Care Instructions Your Care Instructions During this time, you may start to \"show,\" so that you look pregnant to people around you. You may also notice some changes in your skin, such as itchy spots on your palms or acne on your face. Your baby is now able to pass urine, and your baby's first stool (meconium) is starting to collect in his or her intestines. Hair is also beginning to grow on your baby's head. At your next visit, between weeks 18 and 20, your doctor may do an ultrasound test. The test allows your doctor to check for certain problems. Your doctor can also tell the sex of your baby. This is a good time to think about whether you want to know whether your baby is a boy or a girl. Talk to your doctor about getting a flu shot to help keep you healthy during your pregnancy. As your pregnancy moves along, it is common to worry or feel anxious. Your body is changing a lot. And you are thinking about giving birth, the health of your baby, and becoming a parent. You can learn to cope with any anxiety and stress you feel. Follow-up care is a key part of your treatment and safety. Be sure to make and go to all appointments, and call your doctor if you are having problems. It's also a good idea to know your test results and keep a list of the medicines you take. How can you care for yourself at home? ?Reduce stress ? · Ask for help with cooking and housekeeping. ? · Figure out who or what causes your stress. Avoid these people or situations as much as possible. ? · Relax every day. Taking 10- to 15-minute breaks can make a big difference. Take a walk, listen to music, or take a warm bath. ? · Learn relaxation techniques at prenatal or yoga class. Or buy a relaxation tape. ? · List your fears about having a baby and becoming a parent. Share the list with someone you trust. Decide which worries are really small, and try to let them go. Exercise ? · If you did not exercise much before pregnancy, start slowly. Walking is best. Lamond Cullens yourself, and do a little more every day. ? · Brisk walking, easy jogging, low-impact aerobics, water aerobics, and yoga are good choices. Some sports, such as scuba diving, horseback riding, downhill skiing, gymnastics, and water skiing, are not a good idea. ? · Try to do at least 2½ hours a week of moderate exercise, such as a fast walk. One way to do this is to be active 30 minutes a day, at least 5 days a week. It's fine to be active in blocks of 10 minutes or more throughout your day and week. ? · Wear loose clothing. And wear shoes and a bra that provide good support. ? · Warm up and cool down to start and finish your exercise. ? · If you want to use weights, be sure to use light weights. They reduce stress on your joints. ?Stay at the best weight for you ? · Experts recommend that you gain about 1 pound a month during the first 3 months of your pregnancy. ? · Experts recommend that you gain about 1 pound a week during your last 6 months of pregnancy, for a total weight gain of 25 to 35 pounds. ? · If you are underweight, you will need to gain more weight (about 28 to 40 pounds). ? · If you are overweight, you may not need to gain as much weight (about 15 to 25 pounds). ? · If you are gaining weight too fast, use common sense. Exercise every day, and limit sweets, fast foods, and fats. Choose lean meats, fruits, and vegetables. ? · If you are having twins or more, your doctor may refer you to a dietitian. Where can you learn more? Go to http://radha-doug.info/. Enter H612 in the search box to learn more about \"Weeks 14 to 18 of Your Pregnancy: Care Instructions. \" Current as of: March 16, 2017 Content Version: 11.4 © 3515-3150 [employer]. Care instructions adapted under license by [employer] (which disclaims liability or warranty for this information). If you have questions about a medical condition or this instruction, always ask your healthcare professional. [address] any warranty or liability for your use of this information. Exercise During Pregnancy: Care Instructions Your Care Instructions Exercise is good for healthy pregnant women. It can relieve back pain, swelling, and other discomforts of pregnancy. It also prepares your muscles for childbirth. And exercise can improve your energy level and help you sleep better. If your doctor recommends it, get more exercise. Walking is a good choice. Bit by bit, increase the amount you walk every day. Try for at least 30 minutes on most days of the week.  But if you do not already exercise, be sure to talk with your doctor before you start a new exercise program. Try exercise classes for pregnant women. Doctors do not recommend contact sports during pregnancy. Follow-up care is a key part of your treatment and safety. Be sure to make and go to all appointments, and call your doctor if you are having problems. It's also a good idea to know your test results and keep a list of the medicines you take. How can you care for yourself at home? · Talk with your doctor about the right kind of exercise for each stage of pregnancy. · Listen to your body to know if your exercise is at a safe level. ¨ Do not become overheated while you exercise. ¨ If you feel tired, take it easy. You might walk instead of run. ¨ If you are used to strenuous exercise, pay attention to changes in your body that mean it is time to slow down. ¨ High body temperature can be harmful to your baby. So if you want to use a sauna or hot tub, be sure to talk to your doctor about how to use them safely. · If you exercised before getting pregnant, you should be able to keep up your routine early in your pregnancy. That might include running and aerobics. Later, you may want to switch to swimming or walking. · Eat a small snack or drink juice 15 to 30 minutes before you exercise. · Eat a healthy diet. Make sure it includes plenty of beans, peas, and leafy green vegetables. You may need to increase how much you eat to get extra energy for exercise. · Drink plenty of fluids before, during, and after exercise. · Avoid contact sports, such as soccer and basketball. Also avoid scuba diving, exercise in high altitude (above 6,000 feet), and horseback riding. · Do not get overtired while you exercise. You should be able to talk while you work out. · After your fourth month of pregnancy, avoid exercises (such as sit-ups and some yoga poses) that require you to lie flat on your back on a hard surface. · Try swimming and brisk walking during all your pregnancy. · Get plenty of rest. You may be very tired while you are pregnant. Where can you learn more? Go to http://radha-doug.info/. Enter E046 in the search box to learn more about \"Exercise During Pregnancy: Care Instructions. \" Current as of: March 16, 2017 Content Version: 11.4 © 7907-4062 InDemand Interpreting. Care instructions adapted under license by [employer] (which disclaims liability or warranty for this information). If you have questions about a medical condition or this instruction, always ask your healthcare professional. [address] any warranty or liability for your use of this information. Introducing [facility]! Ty Lewis introduce portal paciente William . Ahora se puede acceder a partes de russell expediente médico, enviar por correo electrónico la oficina de russell médico y solicitar renovaciones de medicamentos en línea. En russell navegador de Internet , Jose Ram[place] a [email or web address] Richie clic en el usuario por Elinda [facility]? Willaim Person clic aquí en la sesión Gopi Bobby. Verá la página de registro [place]. Ingrese russell código de [facility] janna y krystin aparece a continuación. Usted no tendrá que [employer] código después de paige completado el proceso de registro . Si usted no se inscribe antes de la fecha de caducidad , debe solicitar un nuevo código. · MyChart Código de acceso : [id number] Expires: 4/15/2018  9:14 AM 
 
Ingresa los últimos cuatro dígitos de russell Número de Seguro Social ( xxxx ) y fecha de nacimiento ( dd / mm / aaaa ) krystin se indica y richie clic en Enviar. Usted será llevado a la siguiente página de registro . Crear un ID MyCmunirat . Esta será russell ID de inicio de sesión de MyChart y no puede ser [place] , por lo que pensar en donna que es Gearldean Casarez y fácil de recordar . Crear donna contraseña MyChart . Usted puede cambiar russell contraseña en cualquier momento . Ingrese russell Password Reset de preguntas y [place] . [place] se puede utilizar en un momento posterior si usted olvida russell contraseña. Introduzca russell dirección de correo electrónico . Mariama Castillo recibirá donna notificación por correo electrónico cuando la nueva información está disponible en MyChart . Barbara aguirre en Registrarse. Metro [place]ow bill y descargar porciones de russell expediente médico. 
Elijah timothy en el enlace de descarga del menú Resumen para descargar donna copia portátil de russell información médica . Si tiene Yasmin [employer] , por favor visite la sección de preguntas frecuentes del sitio web MyChart . Recuerde, MyChart NO es que se utilizará para las necesidades urgentes. Para emergencias médicas , llame al 911 . Ahora disponible en russell iPhone y Android ! Por favor proporcione fausto resumen de la documentación de cuidado a russell próximo proveedor. Your primary care clinician is listed as PROVIDER UNKNOWN. If you have any questions after today's visit, please call [phone].

## 2018-01-19 ENCOUNTER — TELEPHONE (OUTPATIENT)
Dept: FAMILY MEDICINE CLINIC | Age: 23
End: 2018-01-19

## 2018-01-19 ENCOUNTER — INITIAL PRENATAL (OUTPATIENT)
Dept: FAMILY MEDICINE CLINIC | Age: 23
End: 2018-01-19

## 2018-01-19 VITALS
HEIGHT: 56 IN | TEMPERATURE: 98.6 F | BODY MASS INDEX: 22.67 KG/M2 | HEART RATE: 88 BPM | WEIGHT: 100.8 LBS | SYSTOLIC BLOOD PRESSURE: 127 MMHG | DIASTOLIC BLOOD PRESSURE: 80 MMHG | RESPIRATION RATE: 16 BRPM | OXYGEN SATURATION: 100 %

## 2018-01-19 DIAGNOSIS — Z23 ENCOUNTER FOR IMMUNIZATION: ICD-10-CM

## 2018-01-19 DIAGNOSIS — Z32.01 PREGNANCY CONFIRMED BY POSITIVE URINE TEST: ICD-10-CM

## 2018-01-19 DIAGNOSIS — Z3A.17 17 WEEKS GESTATION OF PREGNANCY: Primary | ICD-10-CM

## 2018-01-19 LAB
BILIRUB UR QL STRIP: NEGATIVE
GLUCOSE UR-MCNC: NEGATIVE MG/DL
KETONES P FAST UR STRIP-MCNC: NEGATIVE MG/DL
PH UR STRIP: 6 [PH] (ref 4.6–8)
PROT UR QL STRIP: NEGATIVE
SP GR UR STRIP: 1.03 (ref 1–1.03)
UA UROBILINOGEN AMB POC: NORMAL (ref 0.2–1)
URINALYSIS CLARITY POC: CLEAR
URINALYSIS COLOR POC: YELLOW
URINE BLOOD POC: NEGATIVE
URINE LEUKOCYTES POC: NEGATIVE
URINE NITRITES POC: NEGATIVE

## 2018-01-19 NOTE — PROGRESS NOTES
Chief Complaint   Patient presents with    Initial Prenatal Visit    Medication Refill     Requesting a new prescription for prenatal vitamins, advised per pharmacist previous prescribed prenatal vitamins are no longer manufactered      1. Have you been to the ER, urgent care clinic since your last visit? Hospitalized since your last visit? No    2. Have you seen or consulted any other health care providers outside of the [address] [place] [address] since your last visit? Include any pap smears or colon screening.  No

## 2018-01-19 NOTE — PROGRESS NOTES
Subjective:   Selma Hu is a 23 y.o. female who is being seen today for her first obstetrical visit. The pt is accompanied by her boyfriend. This is not a planned pregnancy. She is at 17w2d gestation by LMP (LMP 2017).      History of GDM or DM? No hx of DM    History of GHTN or HTN? No known hypertension      Relationship with FOB: significant other, living together. She is not  taking prenatal vitamins. Desires second trimester fetal anatomy ultrasound? Yes    Desires genetic testing for Down's Syndrome? Yes      Allergies- reviewed: Allergies   Allergen Reactions    Penicillins Hives         Medications- reviewed:   Current Outpatient Prescriptions   Medication Sig    prenatal vit-calcium-iron-fa (PRENATAL PLUS WITH CALCIUM) 27 mg iron- 1 mg tab Take 1 Tab by mouth daily. No current facility-administered medications for this visit. Past Medical History- reviewed:  No past medical history on file. Past Surgical History- reviewed:   No past surgical history on file. Social History- reviewed:  Social History     Social History    Marital status: SINGLE     Spouse name: N/A    Number of children: N/A    Years of education: N/A     Occupational History    Not on file.      Social History Main Topics    Smoking status: Never Smoker    Smokeless tobacco: Never Used    Alcohol use No    Drug use: No    Sexual activity: Yes     Other Topics Concern    Not on file     Social History Narrative         Immunizations- reviewed:   Immunization History   Administered Date(s) Administered    Influenza Vaccine (Quad) PF 01/15/2018       ROS  : Denies vaginal bleeding and leaking of fluid  GI: Endorses occasional nausea and vomiting      Objective:     Visit Vitals    /80 (BP 1 Location: Right arm, BP Patient Position: Sitting)    Pulse 88    Temp 98.6 °F (37 °C) (Oral)    Resp 16    Ht 4' 8\" (1.422 m)    Wt 100 lb 12.8 oz (45.7 kg)    LMP 2017    SpO2 100%    BMI 22.6 kg/m2       Physical Exam:  GENERAL APPEARANCE: alert, well appearing, in no apparent distress  HEAD: normocephalic, atraumatic   LUNGS: clear to auscultation, no wheezes, rales or rhonchi, symmetric air entry  HEART: regular rate and rhythm, no murmurs  ABDOMEN: fundus soft, nontender 17 cm and FHT present at 145-150  BACK: no CVA tenderness  EXTERNAL GENITALIA: normal appearing vulva with no masses, tenderness or lesions  VAGINA: no abnormal discharge or lesions  CERVIX: no lesions or cervical motion tenderness  UTERUS: gravid  ADNEXA: no masses palpable and nontender  EXTREMITIES: no redness or tenderness in the calves or thighs, no edema  NEUROLOGICAL: alert, oriented, normal speech, no focal findings or movement disorder noted  SKIN: normal coloration and turgor, no rashes    Exam chaperoned by CLAUDIA Dorsey      No results found for this or any previous visit (from the past 12 hour(s)).  at 17w2d by LMP COREY 2017    /80   FH at 17 cm  -150  UA Is clear  Assessment:     Pregnancy at 17w2d by LMP ( 2017)      ICD-10-CM ICD-9-CM    1. 17 weeks gestation of pregnancy Z3A.17 V22.2 CBC WITH AUTOMATED DIFF      METABOLIC PANEL, COMPREHENSIVE      RUBELLA AB, IGG      VZV AB, IGG      ANTIBODY SCREEN      HIV 1/2 AG/AB, 4TH GENERATION,W RFLX CONFIRM      BETA HCG, QT      BLOOD TYPE, (ABO+RH)      HEP B SURFACE AG      CHLAMYDIA/GC PCR      AFP TETRA SCREEN, OBSTETRICAL      CULTURE, URINE      AMB POC URINALYSIS DIP STICK AUTO W/O MICRO      CHLAMYDIA/GC PCR   2. Encounter for immunization Z23 V03.89    3. Pregnancy confirmed by positive urine test Z32.01 V72.42          Plan:   · Initial labs/specimens collected (CBC, blood type & screen, Rh antibody screen, rubella titer, HBsAg titer, RPR, HIV, UA w/ cx, gonorrhea/chlamydia cx).  No PAP due to age ( <21)  · Recommended prenatal vitamins  · Request for 2nd trimester fetal anatomy ultrasound fax[facility] today   · Genetic testing for Down Syndrome is be performed during this pregnancy per patient request., AFP tetra collected today. · Dating US on 1/23/2017 with Dr. Dandy Roach  · Follow-up in 4 week(s) for routine prenatal visit. February schedule is not available yet, the pt will call to make an appointment. Orders Placed This Encounter    CULTURE, URINE    CBC WITH AUTOMATED DIFF    METABOLIC PANEL, COMPREHENSIVE    RUBELLA AB, IGG    VZV AB, IGG    HIV 1/2 AG/AB, 4TH GENERATION,W RFLX CONFIRM    BETA HCG, QT    HEP B SURFACE AG    CHLAMYDIA/GC PCR     Standing Status:   Future     Number of Occurrences:   1     Standing Expiration Date:   1/20/2019     Order Specific Question:   Sample source     Answer:   Swab [241]     Order Specific Question:   Specimen source     Answer:   Vagina [280]    AFP TETRA SCREEN, OBSTETRICAL     Order Specific Question:   Patient Height     Answer:   4     Order Specific Question:   Patient Weight     Answer:   100    AMB POC URINALYSIS DIP STICK AUTO W/O MICRO    ANTIBODY SCREEN    BLOOD TYPE, (ABO+RH)         I have discussed the diagnosis with the patient and the intended plan as seen in the above orders. The patient has received an after-visit summary and questions were answered concerning future plans. I have discussed medication side effects and warnings with the patient as well. Informed pt to return to the office or go to the ER if she experiences vaginal bleeding, vaginal discharge, leaking of fluid, pelvic cramping.       Pt seen and discussed with Dr. Paul Breaux (attending physician)    Bennie David MD  Resident, Family Medicine

## 2018-01-19 NOTE — MR AVS SNAPSHOT
[address] 
[phone] Patient: Sandip Hendrix MRN: [id number] :1995 Visit Information Kaylynn Cobb y Zachery Personal Médico Departamento Teléfono del Dep. Número de visita 2018  8:00 AM Gladys Mao, [facility] [phone] Follow-up Instructions Return in about 4 weeks (around 2018). Your Appointments 2018  3:00 PM  
PROCEDURE with Anthony Hernández MD  
[facility]) Appt Note: Dating Ultrasound-17Weeks 6Days/  
 [address]  
[phone]  
  
   
 [address] [facility] [place] [address] [address] Upcoming Health Maintenance Date Due Hepatitis A Peds Age 1-18 (1 of 2 - Standard Series) 1998 DTaP/Tdap/Td series (1 - Tdap) 2004 HPV AGE 9Y-26Y (1 of 3 - Female 3 Dose Series) 2008 Alergias  Review Complete El: 1/15/2018 Por: Jasper Tee, DO A partir del:  2018 Intensidad Anotado Tipo de reacción [employer] Penicillins  01/15/2018    Hives Vacunas actuales Revisadas el:  2018 Marval Savin Influenza Vaccine (Quad) PF 1/15/2018 [id number] por:  Gladys Mao MD  [id number] el:  2018  8:21 AM  
  
You Were Diagnosed With   
  
 Códigos Comentarios 17 weeks gestation of pregnancy    -  Primary ICD-10-CM: Z3A.17 
ICD-9-CM: V22.2 Partes vitales PS Pulso Temperatura Resp [place] ( percentil de crecimiento) Peso (percentil de crecimiento) 127/80 (BP 1 Location: Right arm, BP Patient Position: Sitting) 88 98.6 °F (37 °C) (Oral) 16 4' 8\" (1.422 m) 100 lb 12.8 oz (45.7 kg) LMP (última chadwick) SpO2 BMI (IMC) Estado obstétrico Estatus de tabaquísmo 2017 100% 22.6 kg/m2 Pregnant Never Smoker Historial de signos vitales BMI and BSA Data Body Mass Index Body Surface Area  
 22.6 kg/m 2 1.34 m 2 [facility] Name Phone [id number]- [address]ji Velez, [address] [phone] Turner lista de medicamentos actualizada Lista actualizada el: 1/19/18  8:41 AM.  Ryan Gay use turner lista de medicamentos más reciente. prenatal vit-calcium-iron-fa 27 mg iron- 1 mg Tab También conocido krystin:  PRENATAL PLUS with CALCIUM Take 1 Tab by mouth daily. Hicimos lo siguiente AFP TETRA SCREEN, OBSTETRICAL [id number] CPT(R)] AMB POC URINALYSIS DIP STICK AUTO W/O MICRO [39971 CPT(R)] ANTIBODY SCREEN Z603950 CPT(R)] BETA HCG, QT [id number] CPT(R)] BLOOD TYPE, (ABO+RH) [63226 CPT(R)] CBC WITH AUTOMATED DIFF [23419 CPT(R)] CULTURE, URINE [id number] CPT(R)] HEP B SURFACE AG [id number] CPT(R)] HIV 1/2 AG/AB, 4TH GENERATION,W RFLX CONFIRM [id number] CPT(R)] METABOLIC PANEL, COMPREHENSIVE [88691 CPT(R)] RUBELLA AB, IGG [id number] CPT(R)] VZV AB, IGG [id number] CPT(R)] Instrucciones de seguimiento Return in about 4 weeks (around 2/16/2018). Por hacer 01/19/2018 Lab:  CHLAMYDIA/GC PCR Instrucciones para el Paciente Semanas 14 a 18 de turner embarazo: Instrucciones de cuidado - [ Charlaine Blonder 14 to 25 of Your Pregnancy: Care Instructions ] Instrucciones de cuidado Noel [employer], es posible que se le empiece a notar que está [place]. También podría observar algunos cambios en la piel, krystin picazón en algunas zonas de las court de las dex o acné en la amee. Aleatha Decree, turner bebé puede orinar y destin primeras heces (meconio) comienzan a acumularse en el intestino. Ana Luisa Logan a crecerle el kandy en la jens. En turner próxima visita, [employer] 18 y 21, turner médico podría hacerle donna ecografía.  La prueba le permite al médico verificar si hay ciertos problemas. Russell médico también puede determinar el sexo de russell bebé. Vonda es un buen momento para pensar si Darrall Holms si russell bebé es Antonio Gola. Hable con russell médico acerca de ponerse la vacuna contra la gripe para ayudar a mantenerse annamaria gigi el embarazo. Con el transcurrir del [facility], es común sentirse preocupada o ansiosa. Russell cuerpo está [employer] Y usted está pensando en felicia a doe, en la cristi de russell bebé y en convertirse en madre. Puede aprender a sobrellevar la ansiedad y el estrés que siente. La atención de seguimiento es donna parte clave de russell tratamiento y seguridad. Asegúrese de hacer y acudir a todas las citas, y llame a russell médico si está teniendo problemas. También es donna buena idea saber los resultados de los exámenes y mantener donna lista de los medicamentos que cyril. Cómo puede cuidarse en el hogar? ?Dieter Buchanan ? · Pida ayuda para cocinar y [employer]. ? · Entienda quién o qué le provoca estrés. Evite a estas personas o situaciones tanto krystin le sea posible. ? · Relájese todos los días. Tomarse descansos de 10 a 15 minutos puede hacerle sentir donna gran diferencia. Camine, escuche música o tome un baño tibio. ? · [place] clases de yoga o educación prenatal para aprender técnicas de relajación. También puede comprar un disco compacto de relajación. ? · [employer] lista de destin temores acerca de tener el bebé y ser [place]. Comparta la lista con alguien de russell confianza. Neha Cos inquietudes son verdaderamente pequeñas, y trate de deshacerse de ellas. Ejercicio ? · Si no hizo mucho ejercicio antes del embarazo, comience poco a poco. Lo mejor es caminar. Regule russell ritmo y richie un poco más cada día. ? · La caminata rápida, el trote lento, los ejercicios aeróbicos de bajo impacto, los ejercicios aeróbicos en el agua y el yoga son Creig Rear opciones.  Algunos deportes, krystin el buceo, la equitación, el esquí Mary, la gimnasia y el esquí acuático no son [place] [employer] idea. ? · Trate de hacer por lo menos 2½ horas de ejercicio moderado a la semana, krystin, por ejemplo, donna caminata rápida. Chela  de hacer esto es estar activo 30 minutos al día, por lo menos 5 días de la [place]. Está emi estar activo en bloques de 10 minutos o más gigi el día y la [place]. ? · Use ropa holgada. Use zapatos y un sostén que le proporcionen un buen soporte. ? · Shade Elizabeth de calentamiento y enfriamiento para comenzar y finalizar destin ejercicios. ? · Si desea usar pesas, asegúrese de que lukas livianas. Estas reducen la tensión en las articulaciones. ?Manténgase en el peso ideal para usted ? · Los expertos recomiendan el aumento de 1 sarai (medio kilo) al [employer] gigi los 3 primeros meses del [facility]. ? · También recomiendan aumentar 1 sarai a la [employer] 6 últimos meses del [facility], para aumentar de 25 a 35 libras (11 a 16 kg) en total.  
? · Si está por debajo del peso recomendable para usted, necesitará aumentar más, de 28 a 40 libras (13 a 18 kg) aproximadamente. ? · Si tiene sobrepeso, quizás no deba aumentar tanto de [place], de 15 a 25 libras (7 a 11 kg) aproximadamente. ? · Si está subiendo de Judson Yeager, use russell sentido común. Corey Johnson [employer] mPura, y Aon [employer], la comida rápida y [place]. Bonnye [place], frutas y verduras. ? · Si va a tener gemelos o más bebés, es posible que russell médico la remita a un dietista. Dónde puede encontrar más información en inglés? Sirena Mckeon a [email or web address].info/. Sylvia Clarity S089 en la búsqueda para aprender más acerca de \"Semanas 14 a 18 de russell embarazo: Instrucciones de cuidado - [ Jamir Sharps 14 to 25 of Your Pregnancy: Care Instructions ]. \" 
Revisado: 16 marzo, 2017 Versión del contenido: 11.4 © 5057-5853 Healthwise, Incorporated.  Las instrucciones de cuidado fueron adaptadas bajo licencia por [employer] (which disclaims liability or warranty for this information). Si usted tiene [place] afección médica o sobre estas instrucciones, siempre pregunte a russell profesional de cristi. [facility] Incorporated niega toda garantía o responsabilidad por russell uso de esta información. Introducing [facility]! [facility] introduce portal paciente MyChart . Ahora se puede acceder a partes de russell expediente médico, enviar por correo electrónico la oficina de russell médico y solicitar renovaciones de medicamentos en línea. En russell navegador de Internet , Sully rodas [email or web address] Richie clic en el usuario por Keke Clifford? Donya Carlie clic aquí en la sesión Jelly Pallas. Verá la página de registro Alva. Ingrese russell código de [employer] janna y krystin aparece a continuación. Usted no tendrá que [employer] código después de paige completado el proceso de registro . Si usted no se inscribe antes de la fecha de caducidad , debe solicitar un nuevo código. · MyChart Código de acceso : [id number] Expires: 4/15/2018  9:14 AM 
 
Ingresa los últimos cuatro dígitos de russell Número de Seguro Social ( xxxx ) y fecha de nacimiento ( dd / mm / aaaa ) krystin se indica y richie clic en Enviar. Usted será llevado a la siguiente página de registro . Crear un ID MyChart . Esta será russell ID de inicio de sesión de MyChart y no puede ser [place] , por lo que pensar en donna que es [place] y fácil de recordar . Crear donna contraseña MyChart . Usted puede cambiar russell contraseña en cualquier momento . Ingrese russell Password Reset de preguntas y Choi . [place] se puede utilizar en un momento posterior si usted olvida russell contraseña. Introduzca russell dirección de correo electrónico . Darrelyn Dienes recibirá donna notificación por correo electrónico cuando la nueva información está disponible en MyChart . Stephanie Benzet clic en Registrarse.  Prudencio Bark bill y descargar porciones de russell expediente Jerri aguirre en el enlace de descarga del menú Resumen para descargar donna copia portátil de russell información médica . Si tiene [employer] , por favor visite la sección de preguntas frecuentes del sitio web MyChart . Recuerde, MyChart NO es que se utilizará para las necesidades urgentes. Para emergencias médicas , llame al 911 . Ahora disponible en russell iPhone y Android ! Por favor proporcione fausto resumen de la documentación de cuidado a russell próximo proveedor. Your primary care clinician is listed as Cindra Blind. If you have any questions after today's visit, please call [phone].

## 2018-01-19 NOTE — PATIENT INSTRUCTIONS
Semanas 14 a 18 de russell embarazo: Instrucciones de cuidado - [ Rosy Sida 14 to 25 of Your Pregnancy: Care Instructions ]  Instrucciones de cuidado    Fogd Drejers [place] 89, es posible que se le empiece a notar que está Lossie Philip. También podría observar algunos cambios en la piel, krystin picazón en algunas zonas de las court de las dex o acné en la amee. Leory Math, russell bebé puede orinar y destin primeras heces (meconio) comienzan a acumularse en el intestino. Racquel Hint a crecerle el kandy en la jens. En russell próxima visita, [employer] 18 y 21, russell médico podría hacerle donna ecografía. La prueba le permite al médico verificar si hay ciertos problemas. Russell médico también puede determinar el sexo de russell bebé. Vonda es un buen momento para pensar si Lynsey Manisha si russell bebé es [place] Chroman. Hable con russell médico acerca de ponerse la vacuna contra la gripe para ayudar a mantenerse annamaria gigi el embarazo. Con el transcurrir del [facility], es común sentirse preocupada o ansiosa. Russell cuerpo está [employer] Y usted está pensando en felicia a doe, en la cristi de russell bebé y en convertirse en madre. Puede aprender a sobrellevar la ansiedad y el estrés que siente. La atención de seguimiento es donna parte clave de russell tratamiento y seguridad. Asegúrese de hacer y acudir a todas las citas, y llame a russell médico si está teniendo problemas. También es donna buena idea saber los resultados de los exámenes y mantener donna lista de los medicamentos que cyril. ¿Cómo puede cuidarse en el hogar? ?Erika Shark  ? · Pida ayuda para cocinar y [employer]. ? · Entienda quién o qué le provoca estrés. Evite a estas personas o situaciones tanto krystin le sea posible. ? · Relájese todos los días. Tomarse descansos de 10 a 15 minutos puede hacerle sentir donna gran diferencia. Camine, escuche música o tome un baño tibio. ? · [place] clases de yoga o educación prenatal para aprender técnicas de relajación.  También puede comprar un disco compacto de relajación. ? · [employer] lista de destin temores acerca de tener el bebé y ser [place]. Comparta la lista con alguien de russell confianza. Kaya Henderson inquietudes son verdaderamente pequeñas, y trate de deshacerse de ellas. Ejercicio  ? · Si no hizo mucho ejercicio antes del embarazo, comience poco a poco. Lo mejor es caminar. Regule russell ritmo y richie un poco más cada día. ? · La caminata rápida, el trote lento, los ejercicios aeróbicos de bajo impacto, los ejercicios aeróbicos en el agua y el yoga son Brendan Eastern opciones. Algunos deportes, krystin el buceo, la equitación, el esquí Mary, la gimnasia y el esquí acuático no son Miki Shiley idea. ? · Trate de hacer por lo menos 2½ horas de ejercicio moderado a la semana, krystin, por ejemplo, donna caminata rápida. Bonnie Actis de hacer esto es estar activo 30 minutos al día, por lo menos 5 días de la [place]. Está emi estar activo en bloques de 10 minutos o más gigi el día y la [place]. ? · Use ropa holgada. Use zapatos y un sostén que le proporcionen un buen soporte. ? · Sandra Horns de calentamiento y enfriamiento para comenzar y finalizar destin ejercicios. ? · Si desea usar pesas, asegúrese de que lukas livianas. Estas reducen la tensión en las articulaciones. ?Manténgase en el peso ideal para usted  ? · Los expertos recomiendan el aumento de 1 sarai (medio kilo) al [employer] gigi los 3 primeros meses del [place]. ? · También recomiendan aumentar 1 sarai a la [employer] 6 últimos meses del Laura Ashley, para aumentar de 25 a 35 libras (11 a 16 kg) en total.   ? · Si está por debajo del peso recomendable para usted, necesitará aumentar más, de 28 a 40 libras (13 a 18 kg) aproximadamente. ? · Si tiene sobrepeso, quizás no deba aumentar tanto de [place], de 15 a 25 libras (7 a 11 kg) aproximadamente. ? · Si está subiendo de Judson Yeager, use russell sentido común. Sandra Valdovinos [employer], y [place], la comida rápida y [place]. Ashley Kirby, frutas y verduras. ? · Si va a tener gemelos o más bebés, es posible que russell médico la remita a un dietista. ¿Dónde puede encontrar más información en inglés? Camelia Roberson a [email or web address]-[email or web address].[email or web address] Breanne Malik F651 en la búsqueda para aprender más acerca de \"Semanas 14 a 18 de russell embarazo: Instrucciones de cuidado - [ Albino Evener 14 to 25 of Your Pregnancy: Care Instructions ]. \"  Revisado: 16 marzo, 2017  Versión del contenido: 11.4  © 0663-9194 Healthwise, [employer]. Las instrucciones de cuidado fueron adaptadas bajo licencia por Good Help Connections (which disclaims liability or warranty for this information). Si usted tiene [place] afección médica o sobre estas instrucciones, siempre pregunte a russell profesional de cristi. [employer], [employer] niega toda garantía o responsabilidad por russell uso de esta información.

## 2018-01-19 NOTE — TELEPHONE ENCOUNTER
Pharmacy calling the prenatal vitamins that were prescribed they can not order 27-1 , please send over an alternative, thank you.

## 2018-01-20 LAB — BACTERIA UR CULT: NO GROWTH

## 2018-01-22 LAB
C TRACH RRNA SPEC QL NAA+PROBE: NEGATIVE
CHLAMYDIA, EXTERNAL: NORMAL
N GONORRHOEA RRNA SPEC QL NAA+PROBE: NEGATIVE
N. GONORRHEA, EXTERNAL: NORMAL

## 2018-01-23 ENCOUNTER — OFFICE VISIT (OUTPATIENT)
Dept: FAMILY MEDICINE CLINIC | Age: 23
End: 2018-01-23

## 2018-01-23 DIAGNOSIS — Z34.90 ENCOUNTER FOR SUPERVISION OF NORMAL PREGNANCY, ANTEPARTUM, UNSPECIFIED GRAVIDITY: Primary | ICD-10-CM

## 2018-01-23 NOTE — PROGRESS NOTES
Chief Complaint   Patient presents with    Erroneous encounter-disregard         A user error has taken place: encounter opened in error, closed for administrative reasons.

## 2018-01-23 NOTE — MR AVS SNAPSHOT
[address] 
[phone] Patient: Nasim Rojas MRN: [id number] :1995 Visit Information Jonelle Maldonado y Zachery Personal Médico Departamento Teléfono del Dep. Número de visita 2018  3:00 PM Jacoby Bose MD [address] [place] [address] [phone] Upcoming Health Maintenance Date Due Hepatitis A Peds Age 1-18 (1 of 2 - Standard Series) 1998 DTaP/Tdap/Td series (1 - Tdap) 2004 HPV AGE 9Y-26Y (1 of 3 - Female 3 Dose Series) 2008 Alergias  Review Complete El: 2018 Por: Zaria Yañez LPN A partir del:  2018 Intensidad Anotado Tipo de reacción [employer] Penicillins  01/15/2018    Hives Vacunas actuales Revisadas el:  2018 Gelhyun Jesenia Influenza Vaccine (Quad) PF 1/15/2018 No revisadas esta visita You Were Diagnosed With   
  
 Mary Kay Clark Encounter for supervision of normal pregnancy, antepartum, unspecified     -  Primary ICD-10-CM: Z34.90 ICD-9-CM: V22.1 ERRONEOUS ENCOUNTER--DISREGARD     ICD-9-CM: 43830 Partes vitales LMP (última chadwick) Estado obstétrico Estatus de tabaquísmo 2017 Pregnant Never Smoker Historial de signos vitales Debbrah Severs [facility]acy Name Phone [id number] [address] [phone] Turner lista de medicamentos actualizada Lista actualizada el: 18  4:37 PM.  Suni Asp use turner lista de medicamentos más reciente. prenatal vit-calcium-iron-fa 27 mg iron- 1 mg Tab También conocido krystin:  PRENATAL PLUS with CALCIUM Take 1 Tab by mouth daily. Introducing [facility]! [facility] introduce portal paciente MyChart .  Ahora se puede acceder a partes de russell expediente médico, enviar por correo electrónico la oficina de russell médico y solicitar renovaciones de medicamentos en línea. En russell navegador de Internet , Bookere Risser a [email or web address] Richie clic en el usuario por Jose Miguel Quinteros? Prerna Edis clic aquí en la sesión Lincoln Gómez. Verá la página de registro [place]. Ingrese russell código de [employer] janna y krystin aparece a continuación. Usted no tendrá que [employer] código después de paige completado el proceso de registro . Si usted no se inscribe antes de la fecha de caducidad , debe solicitar un nuevo código. · MyChart Código de acceso : [id number] Expires: 4/15/2018  9:14 AM 
 
Ingresa los últimos cuatro dígitos de russell Número de Seguro Social ( xxxx ) y fecha de nacimiento ( dd / mm / aaaa ) krystin se indica y richie clic en Enviar. Usted será llevado a la siguiente página de registro . Crear un ID MyChart . Esta será russell ID de inicio de sesión de MyChart y no puede ser [place] , por lo que pensar en donna que es [place] y fácil de recordar . Crear donna contraseña MyChart . Usted puede cambiar russell contraseña en cualquier momento . Ingrese russell Password Reset de preguntas y [place] . [place] se puede utilizar en un momento posterior si usted olvida russell contraseña. Introduzca russell dirección de correo electrónico . Clyda Points recibirá donna notificación por correo electrónico cuando la nueva información está disponible en MyChart . Claudetta [place] clic en Registrarse. Theron Dicker bill y descargar porciones de russell expediente médico. 
Richie clic en el enlace de descarga del menú Resumen para descargar donna copia portátil de russell información médica . Si tiene [employer] , por favor visite la sección de preguntas frecuentes del sitio web MyChart . Recuerde, MyChart NO es que se utilizará para las necesidades urgentes. Para emergencias médicas , llame al 911 . Ahora disponible en russell iPhone y Android ! Por favor proporcione fausto resumen de la documentación de cuidado a russell próximo proveedor. Your primary care clinician is listed as Negro Mendoza. If you have any questions after today's visit, please call [phone].

## 2018-01-24 LAB
2ND TRIMESTER 4 SCREEN SERPL-IMP: NORMAL
2ND TRIMESTER 4 SCREEN SERPL-IMP: NORMAL
ABO GROUP BLD: NORMAL
AFP ADJ MOM SERPL: 1.16
AFP SERPL-MCNC: 60.1 NG/ML
AGE AT DELIVERY: 20.5 YEARS
ALBUMIN SERPL-MCNC: 3.8 G/DL (ref 3.5–5.5)
ALBUMIN/GLOB SERPL: 1.4 {RATIO} (ref 1.2–2.2)
ALP SERPL-CCNC: 57 IU/L (ref 39–117)
ALT SERPL-CCNC: 24 IU/L (ref 0–32)
ANTIBODY SCREEN, EXTERNAL: NEGATIVE
AST SERPL-CCNC: 20 IU/L (ref 0–40)
BASOPHILS # BLD AUTO: 0 X10E3/UL (ref 0–0.2)
BASOPHILS NFR BLD AUTO: 0 %
BILIRUB SERPL-MCNC: 0.5 MG/DL (ref 0–1.2)
BLD GP AB SCN SERPL QL: NEGATIVE
BUN SERPL-MCNC: 7 MG/DL (ref 6–20)
BUN/CREAT SERPL: 18 (ref 9–23)
CALCIUM SERPL-MCNC: 9.1 MG/DL (ref 8.7–10.2)
CHLORIDE SERPL-SCNC: 102 MMOL/L (ref 96–106)
CO2 SERPL-SCNC: 21 MMOL/L (ref 18–29)
COMMENTS,018272: NORMAL
CREAT SERPL-MCNC: 0.38 MG/DL (ref 0.57–1)
EOSINOPHIL # BLD AUTO: 0.2 X10E3/UL (ref 0–0.4)
EOSINOPHIL NFR BLD AUTO: 2 %
ERYTHROCYTE [DISTWIDTH] IN BLOOD BY AUTOMATED COUNT: 13.8 % (ref 12.3–15.4)
FET TS 18 RISK FROM MAT AGE: NORMAL
FET TS 21 RISK FROM MAT AGE: 1156
GA METHOD: NORMAL
GA: 17.3 WEEKS
GLOBULIN SER CALC-MCNC: 2.7 G/DL (ref 1.5–4.5)
GLUCOSE SERPL-MCNC: 64 MG/DL (ref 65–99)
HBSAG, EXTERNAL: NORMAL
HBV SURFACE AG SERPL QL IA: NEGATIVE
HCG ADJ MOM SERPL: 0.81
HCG INTACT+B SERPL-ACNC: NORMAL MIU/ML
HCG SERPL-ACNC: NORMAL MIU/ML
HCT VFR BLD AUTO: 36.7 % (ref 34–46.6)
HGB BLD-MCNC: 12.4 G/DL (ref 11.1–15.9)
HIV 1+2 AB+HIV1 P24 AG SERPL QL IA: NON REACTIVE
IDDM PATIENT QL: NO
IMM GRANULOCYTES # BLD: 0.1 X10E3/UL (ref 0–0.1)
IMM GRANULOCYTES NFR BLD: 1 %
INHIBIN A ADJ MOM SERPL: 1.1
INHIBIN A SERPL-MCNC: 250.26 PG/ML
LYMPHOCYTES # BLD AUTO: 1.7 X10E3/UL (ref 0.7–3.1)
LYMPHOCYTES NFR BLD AUTO: 17 %
MCH RBC QN AUTO: 30.3 PG (ref 26.6–33)
MCHC RBC AUTO-ENTMCNC: 33.8 G/DL (ref 31.5–35.7)
MCV RBC AUTO: 90 FL (ref 79–97)
MONOCYTES # BLD AUTO: 0.8 X10E3/UL (ref 0.1–0.9)
MONOCYTES NFR BLD AUTO: 8 %
MULTIPLE PREGNANCY: NO
NEURAL TUBE DEFECT RISK FETUS: 7366 %
NEUTROPHILS # BLD AUTO: 7.2 X10E3/UL (ref 1.4–7)
NEUTROPHILS NFR BLD AUTO: 72 %
PLATELET # BLD AUTO: 339 X10E3/UL (ref 150–379)
POTASSIUM SERPL-SCNC: 4.1 MMOL/L (ref 3.5–5.2)
PROT SERPL-MCNC: 6.5 G/DL (ref 6–8.5)
RBC # BLD AUTO: 4.09 X10E6/UL (ref 3.77–5.28)
RESULTS, 017389: NORMAL
RH BLD: POSITIVE
RUBELLA, EXTERNAL: NORMAL
RUBV IGG SERPL IA-ACNC: 1.31 INDEX
SODIUM SERPL-SCNC: 138 MMOL/L (ref 134–144)
TS 18 RISK FETUS: NORMAL
TS 21 RISK FETUS: 7640
TYPE, ABO & RH, EXTERNAL: NORMAL
U ESTRIOL ADJ MOM SERPL: 0.93
U ESTRIOL SERPL-MCNC: 1.22 NG/ML
VZV IGG SER IA-ACNC: 1661 INDEX
WBC # BLD AUTO: 10 X10E3/UL (ref 3.4–10.8)

## 2018-01-25 NOTE — PROGRESS NOTES
RH positive, antibody screen negative   Urine culture negative. CBC, CMP normal.   Rubella/varicella immune. HIV/GC/chlamydia/hepBsAg negative.    AFP tetra screen negative

## 2018-01-30 ENCOUNTER — OFFICE VISIT (OUTPATIENT)
Dept: FAMILY MEDICINE CLINIC | Age: 23
End: 2018-01-30

## 2018-01-30 VITALS
SYSTOLIC BLOOD PRESSURE: 115 MMHG | RESPIRATION RATE: 17 BRPM | BODY MASS INDEX: 23.13 KG/M2 | WEIGHT: 102.8 LBS | DIASTOLIC BLOOD PRESSURE: 72 MMHG | OXYGEN SATURATION: 96 % | HEART RATE: 85 BPM | HEIGHT: 56 IN | TEMPERATURE: 98.1 F

## 2018-01-30 DIAGNOSIS — Z34.90 ENCOUNTER FOR SUPERVISION OF NORMAL PREGNANCY, ANTEPARTUM, UNSPECIFIED GRAVIDITY: Primary | ICD-10-CM

## 2018-01-30 PROBLEM — Z34.00 SUPERVISION OF NORMAL FIRST PREGNANCY, ANTEPARTUM: Status: ACTIVE | Noted: 2018-01-30

## 2018-01-30 LAB
BILIRUB UR QL STRIP: NEGATIVE
GLUCOSE UR-MCNC: NORMAL MG/DL
KETONES P FAST UR STRIP-MCNC: NORMAL MG/DL
PH UR STRIP: 7 [PH] (ref 4.6–8)
PROT UR QL STRIP: NORMAL
SP GR UR STRIP: 1.02 (ref 1–1.03)
UA UROBILINOGEN AMB POC: NORMAL (ref 0.2–1)
URINALYSIS CLARITY POC: CLEAR
URINALYSIS COLOR POC: YELLOW
URINE BLOOD POC: NEGATIVE
URINE LEUKOCYTES POC: NORMAL
URINE NITRITES POC: NEGATIVE

## 2018-01-30 NOTE — PROGRESS NOTES
.Dating Ultrasound Procedure Report    Luciano Florian is a 23 y.o. with pregnancy at 18w6d by LMP here for dating ultrasound. Role of ultrasound in pregnancy discussed with patient. Patient consents to undergo dating ultrasound. [facility]  OFFICE PROCEDURE PROGRESS NOTE      Chart reviewed for the following:   Fernando FELIX MD, have reviewed the History, Physical and updated the Allergic reactions for [address] performed immediately prior to start of procedure:   Fernando FELIX MD, have performed the following reviews on Luciano Florian prior to the start of the procedure:            * Patient was identified by name and date of birth   * Agreement on procedure being performed was verified  * Risks and Benefits explained to the patient  * Procedure site verified and marked as necessary  * Patient was positioned for comfort  * Consent was signed and verified     Time: 2:34 PM    Date of procedure: 1/30/2018    Procedure performed by:  Namrata Kohli MD    Provider assisted by: Dr. Nena Hussein MD    Patient assisted by: self    How tolerated by patient: tolerated the procedure well with no complications    Procedure in detail:    Ultrasound performed at bedside for evaluation of pregnancy. Ultrasound Type: Transabdominal  Findings: single intrauterine pregnancy with EGA 18 weeks 2 days by FL: 2.91 cm,  HC: 15.3 cm, BPD; 3.85 cm on transabdominal ultrasound consistent with LMP. Positive fetal movement, fetal heart beat present, normal appearing amiotic fluid, normal uterus, other maternal structures not visualized. COREY 07/01/18. Estimated Gestational Age by Ultrasound: 18 weeks 2days  Fetal Position: Vertex  Placenta: Anterior     Plan:    1. Continue routine prenatal.   2. 2nd trimester fetal anatomy US requested with M. Dr. Manpreet Samson (attending) present for entire procedure. Fernando Kohli MD; Family Medicine Resident

## 2018-01-30 NOTE — PROGRESS NOTES
Chief Complaint   Patient presents with    Ultrasound      ; dating ultrasound     1. Have you been to the ER, urgent care clinic since your last visit? Hospitalized since your last visit? No    2. Have you seen or consulted any other health care providers outside of the [address] [place] [address] since your last visit? Include any pap smears or colon screening.  No

## 2018-01-30 NOTE — PATIENT INSTRUCTIONS
Ejercicio gigi el embarazo: Instrucciones de cuidado - [ Exercise During Pregnancy: Care Instructions ]  Instrucciones de cuidado    El ejercicio es padron para las mujeres embarazadas saludables. Puede [employer] [place], la hinchazón y otras molestias propias del [facility]. También prepara destin músculos para el parto. El ejercicio puede aumentar russell nivel de energía y ayudarle a dormir mejor. Si russell médico se lo recomienda, richie más ejercicio. Caminar es donna buena opción. Poco a poco, aumente la distancia que [employer]. Intente hacer por lo menos 30 minutos de ejercicio la mayoría de los días de la [place]. [place] si actualmente no hace ejercicio, asegúrese de hablar con russell médico antes de empezar un programa nuevo. Pruebe con clases de ejercicios para mujeres embarazadas. Los médicos no recomiendan los deportes de contacto gigi el embarazo. La atención de seguimiento es donna parte clave de russell tratamiento y seguridad. Asegúrese de hacer y acudir a todas las citas, y llame a russell médico si está teniendo problemas. También es donna buena idea saber los resultados de los exámenes y mantener donna lista de los medicamentos que cyril. ¿Cómo puede cuidarse en el hogar? · Hable con russell médico sobre el tipo de ejercicio adecuado para cada etapa del [facility]. · Preste atención a russell cuerpo para saber si está haciendo el ejercicio de [employer]. ¨ Cuando richie ejercicio no se acalore demasiado. ¨ Si se siente cansada, hágalo más suavemente. Podría caminar en lugar de correr. ¨ Si está acostumbrada a hacer ejercicios extenuantes, preste atención a los cambios en russell cuerpo que le indiquen que debe disminuir la intensidad. ¨ La temperatura andrew del cuerpo puede ser dañina para russell bebé. Así que si desea usar un sauna o un \"jacuzzi\", asegúrese de hablar con russell médico sobre cómo usarlos de forma [place].   · Si antes de quedar embarazada ya hacía ejercicio, es probable que pueda continuar con wesley [employer] rutina gigi el principio del [facility]. [place] podría incluir correr y hacer ejercicios aeróbicos. Es posible que después prefiera nadar o caminar. · Consuma un refrigerio pequeño o mary un jugo unos 15 a 30 minutos antes de hacer ejercicio. · Siga donna dieta saludable. Asegúrese de que la dieta incluya muchos frijoles (habichuelas), arvejas (chícharos) y verduras de hojas verdes. Es posible que necesite aumentar la cantidad de alimentos que consuma para tener energía suficiente cuando richie ejercicio. · Mary abundante líquido antes, gigi y después del ejercicio. · Evite los deportes de contacto, tales krystin el fútbol y [place]ri[place]. También evite el buceo, los ejercicios a gran altitud (por encima de los 6,000 pies de altura) y los paseos en neelima. · No se fatigue demasiado cuando richie ejercicio. Debe poder hablar mientras se ejercita. · Después del cuarto mes de embarazo, evite los ejercicios para los que deba acostarse de espalda sobre superficies duras, krystin los abdominales y algunas posiciones de yoga. · Trate de hacer natación y caminatas rápidas gigi todo el embarazo. · Descanse lo suficiente. Podría sentirse muy cansada gigi el embarazo. ¿Dónde puede encontrar más información en inglés? Palomo rodas http://judah.info/. Clara Nugent P695 en la búsqueda para aprender más acerca de \"Ejercicio gigi el embarazo: Instrucciones de cuidado - [ Exercise During Pregnancy: Care Instructions ]. \"  Revisado: 16 marzo, 2017  Versión del contenido: 11.4  © 7358-8268 Healthwise, Incorporated. Las instrucciones de cuidado fueron adaptadas bajo licencia por Good Help Connections (which disclaims liability or warranty for this information). Si usted tiene [place] afección médica o sobre estas instrucciones, siempre pregunte a russell profesional de cristi. Healthwise, Incorporated niega toda garantía o responsabilidad por russell uso de esta información.

## 2018-01-30 NOTE — MR AVS SNAPSHOT
[address] 
[phone] Patient: Luciano Florian MRN: [id number] :1995 Visit Information Dalila Bingham Personal Médico Departamento Teléfono del Dep. Número de visita 2018  2:00 PM Fernando Salcedo, [facility] [phone] [id number] Follow-up Instructions Return in about 4 weeks (around 2018), or if symptoms worsen or fail to improve. Upcoming Health Maintenance Date Due Hepatitis A Peds Age 1-18 (1 of 2 - Standard Series) 1998 DTaP/Tdap/Td series (1 - Tdap) 2004 HPV AGE 9Y-26Y (1 of 3 - Female 3 Dose Series) 2008 Alergias  Review Complete El: 2018 Por: Jeanne Reeves LPN A partir del:  2018 Intensidad Anotado Tipo de reacción [employer] Penicillins  01/15/2018    Hives Vacunas actuales Revisadas el:  2018 Carleene Frankel Influenza Vaccine (Quad) PF 1/15/2018 No revisadas esta visita You Were Diagnosed With   
  
 Radha Baker Encounter for supervision of normal pregnancy, antepartum, unspecified     -  Primary ICD-10-CM: Z34.90 ICD-9-CM: V22.1 Partes vitales PS Pulso Temperatura Resp [place] ( percentil de crecimiento) Peso (percentil de crecimiento) 115/72 85 98.1 °F (36.7 °C) (Oral) 17 4' 8\" (1.422 m) 102 lb 12.8 oz (46.6 kg) LMP (última chadwick) SpO2 BMI (IMC) Estado obstétrico Estatus de tabaquísmo 2017 96% 23.05 kg/m2 Pregnant Never Smoker Historial de signos vitales BMI and BSA Data Body Mass Index Body Surface Area 23.05 kg/m 2 1.36 m 2 [facility] Name Phone [id number] [place], [address] [phone] [place] lista de medicamentos actualizada Lista actualizada el: 1/30/18  3:03 PM.  Britta Greene use russell lista de medicamentos más reciente. prenatal vit-calcium-iron-fa 27 mg iron- 1 mg Tab También conocido krystin:  PRENATAL PLUS with CALCIUM Take 1 Tab by mouth daily. Instrucciones de seguimiento Return in about 4 weeks (around 2/27/2018), or if symptoms worsen or fail to improve. Instrucciones para el Paciente Ejercicio gigi el embarazo: Instrucciones de cuidado - [ Exercise During Pregnancy: Care Instructions ] Instrucciones de cuidado El ejercicio es padron para las mujeres embarazadas saludables. Puede [employer] [place], la hinchazón y otras molestias propias del [facility]. También prepara destin músculos para el parto. El ejercicio puede aumentar russell nivel de energía y ayudarle a dormir mejor. Si russell médico se lo recomienda, richie más ejercicio. Caminar es donna buena opción. Poco a poco, aumente la distancia que [employer]. Intente hacer por lo menos 30 minutos de ejercicio la mayoría de los días de la [place]. Tripp si actualmente no hace ejercicio, asegúrese de hablar con russell médico antes de empezar un programa nuevo. Pruebe con clases de ejercicios para mujeres embarazadas. Los médicos no recomiendan los deportes de contacto gigi el embarazo. La atención de seguimiento es donna parte clave de russell tratamiento y seguridad. Asegúrese de hacer y acudir a todas las citas, y llame a russell médico si está teniendo problemas. También es donna buena idea saber los resultados de los exámenes y mantener donna lista de los medicamentos que cyril. Cómo puede cuidarse en el hogar? · Hable con russell médico sobre el tipo de ejercicio adecuado para cada etapa del [facility]. · Preste atención a russell cuerpo para saber si está haciendo el ejercicio de [employer]. ¨ Cuando richie ejercicio no se acalore demasiado. ¨ Si se siente cansada, hágalo más suavemente. Podría caminar en lugar de correr. ¨ Si está acostumbrada a hacer ejercicios extenuantes, preste atención a los cambios en russell cuerpo que le indiquen que debe disminuir la intensidad. ¨ La temperatura andrew del cuerpo puede ser dañina para russell bebé. Así que si desea usar un sauna o un \"jacuzzi\", asegúrese de hablar con russell médico sobre cómo usarlos de forma [place]. · Si antes de quedar embarazada ya hacía ejercicio, es probable que pueda continuar con wesley misma rutina gigi el principio del [facility]. [place] podría incluir correr y hacer ejercicios aeróbicos. Es posible que después prefiera nadar o caminar. · Consuma un refrigerio pequeño o mary un jugo unos 15 a 30 minutos antes de hacer ejercicio. · Siga donna dieta saludable. Asegúrese de que la dieta incluya muchos frijoles (habichuelas), arvejas (chícharos) y verduras de hojas verdes. Es posible que necesite aumentar la cantidad de alimentos que consuma para tener energía suficiente cuando richie ejercicio. · Mary abundante líquido antes, gigi y después del ejercicio. · Evite los deportes de contacto, tales krystin el fútbol y Yvonne. También evite el buceo, los ejercicios a gran altitud (por encima de los 6,000 pies de altura) y los paseos en neelima. · No se fatigue demasiado cuando richie ejercicio. Debe poder hablar mientras se ejercita. · Después del cuarto mes de embarazo, evite los ejercicios para los que deba acostarse de espalda sobre superficies duras, krystin los abdominales y algunas posiciones de yoga. · Trate de hacer natación y caminatas rápidas gigi todo el embarazo. · Descanse lo suficiente. Podría sentirse muy cansada gigi el embarazo. Dónde puede encontrar más información en inglés? Kavin Hebert a http://radha-doug.info/. Yahaira Lawson G096 en la búsqueda para aprender más acerca de \"Ejercicio gigi el embarazo: Instrucciones de cuidado - [ Exercise During Pregnancy: Care Instructions ]. \" 
Revisado: 16 marzo, 2017 Versión del contenido: 11.4 © 2162-8643 Healthwise, Incorporated. Las instrucciones de cuidado fueron adaptadas bajo licencia por Good Help Connections (which disclaims liability or warranty for this information). Si usted tiene [place] afección médica o sobre estas instrucciones, siempre pregunte a russell profesional de cristi. Healthwise, Incorporated niega toda garantía o responsabilidad por russell uso de esta información. Introducing [facility] SERVICES! Ty Lewis introduce portal paciente MyChart . Ahora se puede acceder a partes de russell expediente médico, enviar por correo electrónico la oficina de russell médico y solicitar renovaciones de medicamentos en línea. En russell navegador de Internet , Hali Alex a [email or web address] [employer] [facility]/mychart Richie clic en el usuario por Elle Hebert? Obi Fuse clic aquí en la sesión Timothy March. Verá la página de registro [place]. Ingrese russell código de [employer] janna y krystin aparece a continuación. Usted no tendrá que [employer] código después de paige completado el proceso de registro . Si usted no se inscribe antes de la fecha de caducidad , debe solicitar un nuevo código. · MyChart Código de acceso : [id number] Expires: 4/15/2018  9:14 AM 
 
Ingresa los últimos cuatro dígitos de russell Número de Seguro Social ( xxxx ) y fecha de nacimiento ( dd / mm / aaaa ) krystin se indica y richie clic en Enviar. Demetrius será llevado a la siguiente página de registro . Crear un ID MyChart . Esta será russell ID de inicio de sesión de MyChart y no puede ser [place] , por lo que pensar en donna que es Salome Liliana y fácil de recordar . Crear donna contraseña MyChart . Usted puede cambiar russell contraseña en cualquier momento . Ingrese russell Password Reset de preguntas y Choi . [place] se puede utilizar en un momento posterior si usted olvida russell contraseña. Introduzca russell dirección de correo electrónico . Virgilio Guerra recibirá donna notificación por correo electrónico cuando la nueva información está disponible en MyChart . Samual Holstein clic en Registrarse. [place] Noss bill y descargar porciones de russell expediente médico. 
Elijah emoryic en el enlace de descarga del menú Resumen para descargar donna copia portátil de russell información médica . Si tiene [employer] , por favor visite la sección de preguntas frecuentes del sitio web MyChart . Recuerde, MyChart NO es que se utilizará para las necesidades urgentes. Para emergencias médicas , llame al 911 . Ahora disponible en russell iPhone y Android ! Por favor proporcione fausto resumen de la documentación de cuidado a russell próximo proveedor. Your primary care clinician is listed as Fadumo Mo. If you have any questions after today's visit, please call [phone].

## 2018-01-31 ENCOUNTER — TELEPHONE (OUTPATIENT)
Dept: FAMILY MEDICINE CLINIC | Age: 23
End: 2018-01-31

## 2018-01-31 NOTE — TELEPHONE ENCOUNTER
Called and LVM informing patient of upcoming [facility] appointment with M. The appointment is scheduled for Monday 2/12/18 at 1:30pm at the [facility] location.  Patient verbalized understanding

## 2018-02-01 NOTE — PROGRESS NOTES
I reviewed with the resident the medical history and the resident's findings on the physical examination. I discussed with the resident the patient's diagnosis and concur with the plan. Confirmation of pregnancy. Schedule for initial PNV.

## 2018-02-07 NOTE — PROGRESS NOTES
I saw and evaluated the patient, performing the key elements of the service. I discussed the findings, assessment and plan with the resident and agree with the resident's findings and plan as documented in the resident's note. Ultrasound c/w LMP. Cont routine prenatal care. Agree with ultrasound findings per resident notation. Single viable IUP with pos cardiac activity. M referral for anatomy.

## 2018-02-12 ENCOUNTER — HOSPITAL ENCOUNTER (OUTPATIENT)
Dept: PERINATAL CARE | Age: 23
Discharge: HOME | End: 2018-02-12
Attending: OBSTETRICS & GYNECOLOGY
Payer: MEDICAID

## 2018-02-12 PROCEDURE — 76811 OB US DETAILED SNGL FETUS: CPT | Performed by: OBSTETRICS & GYNECOLOGY

## 2018-02-14 NOTE — PROGRESS NOTES
Normal male fetal anatomy screen except echogenic bowel. Size c/w dates. Routine prenatal care. F/u @ 34 weeks. [facility] 6/27/18. Orders placed for toxo, CMV, and CF.

## 2018-02-20 ENCOUNTER — TELEPHONE (OUTPATIENT)
Dept: PERINATAL CARE | Age: 23
End: 2018-02-20

## 2018-02-27 ENCOUNTER — ROUTINE PRENATAL (OUTPATIENT)
Dept: FAMILY MEDICINE CLINIC | Age: 23
End: 2018-02-27

## 2018-02-27 VITALS
WEIGHT: 106.8 LBS | OXYGEN SATURATION: 99 % | DIASTOLIC BLOOD PRESSURE: 71 MMHG | TEMPERATURE: 98.5 F | RESPIRATION RATE: 16 BRPM | HEIGHT: 56 IN | SYSTOLIC BLOOD PRESSURE: 112 MMHG | HEART RATE: 83 BPM | BODY MASS INDEX: 24.02 KG/M2

## 2018-02-27 DIAGNOSIS — Z34.92 PRENATAL CARE IN SECOND TRIMESTER: ICD-10-CM

## 2018-02-27 DIAGNOSIS — Z34.00 SUPERVISION OF NORMAL FIRST PREGNANCY, ANTEPARTUM: Primary | ICD-10-CM

## 2018-02-27 LAB
BILIRUB UR QL STRIP: NEGATIVE
GLUCOSE UR-MCNC: NORMAL MG/DL
KETONES P FAST UR STRIP-MCNC: NEGATIVE MG/DL
PH UR STRIP: 7 [PH] (ref 4.6–8)
PROT UR QL STRIP: NEGATIVE
SP GR UR STRIP: 1.02 (ref 1–1.03)
UA UROBILINOGEN AMB POC: NORMAL (ref 0.2–1)
URINALYSIS CLARITY POC: CLEAR
URINALYSIS COLOR POC: YELLOW
URINE BLOOD POC: NORMAL
URINE LEUKOCYTES POC: NEGATIVE
URINE NITRITES POC: NEGATIVE

## 2018-02-27 NOTE — MR AVS SNAPSHOT
[address] 
[phone] Patient: Kiesha Ernandez MRN: [id number] :1995 Visit Information Date & Time Provider Department Dept. Phone Encounter #  
 2018  2:25 PM Kaylynn Clancy MD 1000 [facility] [phone] [id number] Follow-up Instructions Return in about 4 weeks (around 3/26/2018). 3/26/2018  2:25 PM  
OB VISIT with Kaylynn Clancy MD  
1000 [facility]) Appt Note: ob visit [address] [place] [address]  
[phone]  
  
   
 9250 [facility] [place] Same[place] [id number] Upcoming Health Maintenance Date Due Hepatitis A Peds Age 1-18 (1 of 2 - Standard Series) 1998 DTaP/Tdap/Td series (1 - Tdap) 2004 HPV AGE 9Y-26Y (1 of 3 - Female 3 Dose Series) 2008 Allergies as of 2018  Review Complete On: 2018 By: Anders Carballo LPN Severity Noted Reaction Type Reactions Penicillins  01/15/2018    Hives Current Immunizations  Reviewed on 2018 Name Date Influenza Vaccine (Quad) PF 1/15/2018 Reviewed by Kaylynn Clancy MD on 2018 at  2:37 PM  
You Were Diagnosed With   
  
 Codes Comments Supervision of normal first pregnancy, antepartum    -  Primary ICD-10-CM: Z34.00 ICD-9-CM: V22.0 Prenatal care in second trimester     ICD-10-CM: Z34.92 
ICD-9-CM: V22.1 Vitals BP Pulse Temp Resp Height(growth percentile) Weight(growth percentile) 112/71 (BP 1 Location: Left arm, BP Patient Position: Sitting) 83 98.5 °F (36.9 °C) (Oral) 16 4' 8\" (1.422 m) 106 lb 12.8 oz (48.4 kg) LMP SpO2 BMI OB Status Smoking Status 2017 99% 23.94 kg/m2 Pregnant Never Smoker Vitals History BMI and BSA Data Body Mass Index Body Surface Area  
 23.94 kg/m 2 1.38 m 2 Preferred Pharmacy Pharmacy Name Phone [facility][id number]- [place]ch, [address] [phone] Your Updated Medication List  
  
   
This list is accurate as of 2/27/18  2:55 PM.  Always use your most recent med list.  
  
  
  
  
 prenatal vit-calcium-iron-fa 27 mg iron- 1 mg Tab Commonly known as:  PRENATAL PLUS with CALCIUM Take 1 Tab by mouth daily. We Performed the Following AMB POC URINALYSIS DIP STICK AUTO W/O MICRO [79630 CPT(R)] AMB POC URINALYSIS DIP STICK AUTO W/O MICRO [04833 CPT(R)] Follow-up Instructions Return in about 4 weeks (around 3/26/2018). Patient Instructions Semanas 22 a 26 de russell embarazo: Instrucciones de cuidado - [ Corina Adjutant 22 to 26 of Your Pregnancy: Care Instructions ] Instrucciones de cuidado Al comenzar el 7.º mes de russell embarazo en la semana 32, los pulmones de russell bebé se están volviendo más el y se están preparando para respirar. Podría notar que russell bebé responde al ki de russell voz o la de russell denisse. También podría notar que russell bebé se voltea y [place] menos de posición, y se retuerce o sacude más. Por lo general, las sacudidas indican que russell bebé tiene hipo. Tener hipo es totalmente normal y dura poco tiempo. Alden vez sea buena idea pensar en asistir a donna clase de preparación para el parto. Vonda es también un buen momento para comenzar a pensar si desea que gigi el parto le administren un analgésico (medicamento para el dolor). A la mayoría de las mujeres embarazadas les hacen pruebas para la diabetes gestacional entre las semanas 24 y 29. La diabetes gestacional ocurre cuando el nivel de azúcar en la franny es muy alto gigi el [facility]. La prueba es importante, porque usted puede tener diabetes gestacional y no saberlo. Tripp esta enfermedad puede causarle problemas a russell bebé.  
Williams Ted de seguimiento es donna parte clave de russell tratamiento y seguridad. Asegúrese de hacer y acudir a todas las citas, y llame a russell médico si está teniendo problemas. También es donna buena idea saber los resultados de los exámenes y mantener donna lista de los medicamentos que cyril. Cómo puede cuidarse en el hogar? Alivie las molestias de las patadas de russell bebé · Cambie de posición. A veces, fausto cambio hace que russell bebé también cambie de posición. · Respire hondo al mismo tiempo que levanta los brazos sobre russell [place]. Después exhale a medida que baja los brazos. Elijah los ejercicios de Kegel para evitar pérdidas de [place] · Puede hacer los ejercicios de Kegel estando manrique o sentada. ¨ Apriete los mismos músculos que usaría para detener el chorro de [place]. El abdomen y los muslos no deben moverse. ¨ Manténgalos apretados gigi 3 segundos y, luego, relájelos otros 3 segundos. ¨ Empiece con 3 segundos. Ying Force, añada 1 herminia cada semana hasta que sea capaz de apretar gigi 10 segundos. ¨ Repita el ejercicio entre 10 y 13 veces cada sesión. Elijah cathy o más sesiones cada día. Alivie o reduzca la hinchazón de los pies, los tobillos, las dex y los dedos · Si tiene los dedos hinchados, quítese los [place]. · No coma alimentos con mucha sal, krystin ana fritas. · Coloque destin pies sobre un banco o un sofá todo el tiempo que le sea posible. Duerma con almohadas debajo de los pies. · No se quede de pie gigi mucho tiempo ni use calzado ajustado. · Use medias elásticas de soporte. Dónde puede encontrar más información en inglés? Linda Lui a http://radha-doug.info/. Escriba G264 en la búsqueda para aprender más acerca de \"Semanas 22 a 26 de russell embarazo: Instrucciones de cuidado - [ Donnise Lean 22 to 26 of Your Pregnancy: Care Instructions ]. \" 
Revisado: 16 marzo, 2017 Versión del contenido: 11.4 © 4487-7783 Healthwise, [employer] [place].  Las instrucciones de cuidado fueron adaptadas bajo licencia por Good Help Connections (which disclaims liability or warranty for this information). Si usted tiene [place] afección médica o sobre estas instrucciones, siempre pregunte a russell profesional de cristi. [employer] Incorporated niega toda garantía o responsabilidad por russell uso de esta información. Introducing [facility]! [facility] introduces Opencare patient portal. Now you can access parts of your medical record, email your doctor's office, and request medication refills online. 1. In your internet browser, go to [email or web address] 2. Click on the First Time User? Click Here link in the Sign In box. You will see the New Member Sign Up page. 3. Enter your Opencare Access Code exactly as it appears below. You will not need to use this code after youve completed the sign-up process. If you do not sign up before the expiration date, you must request a new code. · Opencare Access Code: [email or web address] Expires: 4/15/2018  9:14 AM 
 
4. Enter the last four digits of your Social Security Number (xxxx) and Date of Birth (mm/dd/yyyy) as indicated and click Submit. You will be taken to the next sign-up page. 5. Create a Opencare ID. This will be your Opencare login ID and cannot be changed, so think of one that is secure and easy to remember. 6. Create a Opencare password. You can change your password at any time. 7. Enter your Password Reset Question and Answer. This can be used at a later time if you forget your password. 8. Enter your e-mail address. You will receive e-mail notification when new information is available in [address] 9. Click Sign Up. You can now view and download portions of your medical record. 10. Click the Download Summary menu link to download a portable copy of your medical information. If you have questions, please visit the Frequently Asked Questions section of the Opencare website. Remember, Opencare is NOT to be used for urgent needs. For medical emergencies, dial 911. Now available from your iPhone and Android! Please provide this summary of care documentation to your next provider. Your primary care clinician is listed as Bennie David. If you have any questions after today's visit, please call [phone].

## 2018-02-27 NOTE — PROGRESS NOTES
Return OB Visit       Subjective:   German Antunez 23 y.o.   COREY: 2018, Date entered prior to episode creation  GA:  22w6d. States she does not have abdominal pain  , chest pain, contractions, fever, headache , nausea and vomiting, pelvic pressure, right upper quadrant pain  , shortness of breath, swelling, vaginal bleeding  and vaginal leaking of fluid . Fetal movements are present. She is taking PNV and she is eating healthy. Allergies- reviewed: Allergies   Allergen Reactions    Penicillins Hives         Medications- reviewed:   Current Outpatient Prescriptions   Medication Sig    prenatal vit-calcium-iron-fa (PRENATAL PLUS WITH CALCIUM) 27 mg iron- 1 mg tab Take 1 Tab by mouth daily. No current facility-administered medications for this visit. Past Medical History- reviewed:  No past medical history on file. Past Surgical History- reviewed:   No past surgical history on file. Social History- reviewed:  Social History     Social History    Marital status: SINGLE     Spouse name: N/A    Number of children: N/A    Years of education: N/A     Occupational History    Not on file.      Social History Main Topics    Smoking status: Never Smoker    Smokeless tobacco: Never Used    Alcohol use No    Drug use: No    Sexual activity: Yes     Other Topics Concern    Not on file     Social History Narrative         Immunizations- reviewed:   Immunization History   Administered Date(s) Administered    Influenza Vaccine (Quad) PF 01/15/2018         Objective:     Visit Vitals    /71 (BP 1 Location: Left arm, BP Patient Position: Sitting)    Pulse 83    Temp 98.5 °F (36.9 °C) (Oral)    Resp 16    Ht 4' 8\" (1.422 m)    Wt 106 lb 12.8 oz (48.4 kg)    LMP 2017    SpO2 99%    BMI 23.94 kg/m2       Physical Exam:  GENERAL APPEARANCE: alert, well appearing, in no apparent distress  LUNGS: clear to auscultation, no wheezes, rales or rhonchi, symmetric air entry  HEART: regular rate and rhythm, no murmurs  ABDOMEN: soft, nontender, nondistended, no abnormal masses, no epigastric pain, bowel sounds present, fundal height   21 cm, FHT present at 150-155 bpm  BACK: no CVA tenderness  UTERUS: gravid  EXTREMITIES: no redness or tenderness in the calves or thighs, no edema  NEUROLOGICAL: alert, oriented, normal speech, no focal findings or movement disorder noted    Labs  Recent Results (from the past 12 hour(s))   AMB POC URINALYSIS DIP STICK AUTO W/O MICRO    Collection Time: 18  2:23 PM   Result Value Ref Range    Color (UA POC) Yellow     Clarity (UA POC) Clear     Glucose (UA POC) Trace Negative    Bilirubin (UA POC) Negative Negative    Ketones (UA POC) Negative Negative    Specific gravity (UA POC) 1.025 1.001 - 1.035    Blood (UA POC) Trace Negative    pH (UA POC) 7.0 4.6 - 8.0    Protein (UA POC) Negative Negative    Urobilinogen (UA POC) 1 mg/dL 0.2 - 1    Nitrites (UA POC) Negative Negative    Leukocyte esterase (UA POC) Negative Negative         Assessment   22 y.o.   at  22w6d by LMP COREY: 2018  BP  at 112/71    FH 21 cm  FHTs 150-155 bpm  Urine Clear   PNL taking       Plan   1. SIUP at 22w6d by LMP c/w 2nd trimester US. COREY: 2018  · Prenatal labs: RH positive, antibody screen negative, Urine culture negative, CBC ( Hg was 12.4), CMP normal.Rubella/varicella immune. HIV/GC/chlamydia/hepBsAg negative. AFP tetra screen negative  · Continue prenatal vitamins   · Influenza vaccine was given on 1/15/18   · Follow up appointment is scheduled with me on 3/26/18 at 2.25 pm.     2. Abnormal second trimester US. Normal male fetal anatomy scan except echogenic bowel. The additional testing was done( Toxo, CMV and CF) which came back negative.  Recommended to repeat US at 34 weeks    Labor precautions discussed, including: Regular painful contractions, lasting for greater than one hour, taking your breath away; any vaginal bleeding; any leakage of fluid; or absent or decreased fetal movement. Call M.D. on call if any of these symptoms or signs occur. I have discussed the diagnosis with the patient and the intended plan as seen in the above orders. The patient has received an after-visit summary and questions were answered concerning future plans. I have discussed medication side effects and warnings with the patient as well. Informed pt to return to the office or go to the ER if she experiences vaginal bleeding, vaginal discharge, leaking of fluid, pelvic cramping. Pt was discussed with Dr. Ashwin Kaminski, Attending Physician. Bernardino Brizuela MD  Family Medicine Resident, PGY-2. Encounter Diagnoses:    ICD-10-CM ICD-9-CM    1. Supervision of normal first pregnancy, antepartum Z34.00 V22.0 AMB POC URINALYSIS DIP STICK AUTO W/O MICRO      CANCELED: AMB POC URINALYSIS DIP STICK AUTO W/O MICRO   2.  Prenatal care in second trimester Z34.92 V22.1

## 2018-02-27 NOTE — PROGRESS NOTES
G1 at 22 weeks    Has no complaints    Taking prenatal vitamins    Fetal anatomy scan:  Echogenic bowel  TORCH infection labs sent  Will have repeat US done at 34 weeks    FHT = 150    Will followup on labs -- ? If they have been done    I reviewed with the resident the medical history and the resident's findings on the physical examination. I discussed with the resident the patient's diagnosis and concur with the plan.

## 2018-02-27 NOTE — PATIENT INSTRUCTIONS
Semanas 22 a 26 de russell embarazo: Instrucciones de cuidado - [ Martha Estrella 22 to 26 of Your Pregnancy: Care Instructions ]  Instrucciones de cuidado    Al comenzar el 7.º mes de russell embarazo en la semana 26, los pulmones de russell bebé se están volviendo más el y se están preparando para respirar. Podría notar que russell bebé responde al ki de russell voz o la de russell denisse. También podría notar que russell bebé se voltea y [place] menos de posición, y se retuerce o sacude más. Por lo general, las sacudidas indican que russell bebé tiene hipo. Tener hipo es totalmente normal y dura poco tiempo. Alden vez sea buena idea pensar en asistir a donna clase de preparación para el parto. Fausto es también un buen momento para comenzar a pensar si desea que gigi el parto le administren un analgésico (medicamento para el dolor). A la mayoría de las mujeres embarazadas les hacen pruebas para la diabetes gestacional entre las semanas 24 y 29. La diabetes gestacional ocurre cuando el nivel de azúcar en la franny es muy alto gigi el [facility]. La prueba es importante, porque usted puede tener diabetes gestacional y no saberlo. Tripp esta enfermedad puede causarle problemas a russell bebé. La atención de seguimiento es donna parte clave de russell tratamiento y seguridad. Asegúrese de hacer y acudir a todas las citas, y llame a rusesll médico si está teniendo problemas. También es donna buena idea saber los resultados de los exámenes y mantener donna lista de los medicamentos que cyril. ¿Cómo puede cuidarse en el hogar? Alivie las molestias de las patadas de russell bebé  · Cambie de posición. A veces, fausto cambio hace que russell bebé también cambie de posición. · Respire hondo al mismo tiempo que levanta los brazos sobre russell [place]. Después exhale a medida que baja los brazos. Elijah los ejercicios de K[place] para evitar pérdidas de [facility]  · [employer] ejercicios de [place] estando manrique o sentada. ¨ Apriete los mismos músculos que usaría para detener el chorro de [facility].  El abdomen y los muslos no deben moverse. ¨ Manténgalos apretados gigi 3 segundos y, luego, relájelos otros 3 segundos. ¨ Empiece con 3 segundos. Cristine Casanova, añada 1 herminia cada semana hasta que sea capaz de apretar gigi 10 segundos. ¨ Repita el ejercicio entre 10 y 13 veces cada sesión. Elijah cathy o más sesiones cada día. Alivie o reduzca la hinchazón de los pies, los tobillos, las dex y los dedos  · Si tiene los dedos hinchados, quítese los [place]. · No coma alimentos con mucha sal, krystin ana fritas. · Coloque destin pies sobre un banco o un sofá todo el tiempo que le sea posible. Duerma con almohadas debajo de los pies. · No se quede de pie gigi mucho tiempo ni use calzado ajustado. · Use medias elásticas de soporte. ¿Dónde puede encontrar más información en inglés? Palomo Gallardo a http://radha-doug.info/. Escriba [id number] en la búsqueda para aprender más acerca de \"Semanas 22 a 26 de russell embarazo: Instrucciones de cuidado - [ Cholo Certain 22 to 26 of Your Pregnancy: Care Instructions ]. \"  Revisado: 16 marzo, 2017  Versión del contenido: 11.4  © 0245-6746 Healthwise, Incorporated. Las instrucciones de cuidado fueron adaptadas bajo licencia por [employer] (which disclaims liability or warranty for this information). Si usted tiene [place] afección médica o sobre estas instrucciones, siempre pregunte a russell profesional de cristi. Healthwise, Incorporated niega toda garantía o responsabilidad por russell uso de esta información.

## 2018-03-26 ENCOUNTER — ROUTINE PRENATAL (OUTPATIENT)
Dept: FAMILY MEDICINE CLINIC | Age: 23
End: 2018-03-26

## 2018-03-26 VITALS
SYSTOLIC BLOOD PRESSURE: 117 MMHG | WEIGHT: 108 LBS | BODY MASS INDEX: 24.3 KG/M2 | HEIGHT: 56 IN | HEART RATE: 74 BPM | TEMPERATURE: 97.7 F | DIASTOLIC BLOOD PRESSURE: 67 MMHG

## 2018-03-26 DIAGNOSIS — Z23 ENCOUNTER FOR IMMUNIZATION: ICD-10-CM

## 2018-03-26 DIAGNOSIS — Z3A.26 26 WEEKS GESTATION OF PREGNANCY: Primary | ICD-10-CM

## 2018-03-26 LAB
BILIRUB UR QL STRIP: NEGATIVE
GLUCOSE UR-MCNC: NEGATIVE MG/DL
KETONES P FAST UR STRIP-MCNC: NEGATIVE MG/DL
PH UR STRIP: 7 [PH] (ref 4.6–8)
PROT UR QL STRIP: NEGATIVE
SP GR UR STRIP: 1.02 (ref 1–1.03)
UA UROBILINOGEN AMB POC: NORMAL (ref 0.2–1)
URINALYSIS CLARITY POC: CLEAR
URINALYSIS COLOR POC: YELLOW
URINE BLOOD POC: NEGATIVE
URINE LEUKOCYTES POC: NORMAL
URINE NITRITES POC: NEGATIVE

## 2018-03-26 NOTE — PATIENT INSTRUCTIONS
Semanas 26 a 30 de russell embarazo: Instrucciones de cuidado - [ Milka Kesha 26 to 30 of Your Pregnancy: Care Instructions ]  Instrucciones de cuidado    Ahora usted se encuentra en el último trimestre del [facility]. Russell bebé está creciendo rápidamente. Y es probable que sienta que russell bebé se mueve con más frecuencia. Es posible que russell médico le diga que cuente la cantidad de patadas que da russell bebé. La espalda puede dolerle mientras russell cuerpo se acostumbra al tamaño y a la longitud de russell bebé. Si todavía no le harris aplicado la vacuna Tdap (tétanos, difteria y tos [place]) gigi fausto [facility], hable con russell médico acerca de aplicársela. Aranza Glide a proteger a russell recién nacido contra la infección por tos ferina. Gigi fausto tiempo, es importante que se cuide y preste atención a lo que russell cuerpo necesita. Si tiene [employer], busque formas de estar con russell denisse que satisfagan russell nivel de comodidad y deseo. Utilice las recomendaciones proporcionadas en esta hoja de cuidados para encontrar russell propia manera de vivir la sexualidad. La atención de seguimiento es donna parte clave de russell tratamiento y seguridad. Asegúrese de hacer y acudir a todas las citas, y llame a russell médico si está teniendo problemas. También es donna buena idea saber los resultados de los exámenes y mantener donna lista de los medicamentos que cyril. ¿Cómo puede cuidarse en el hogar? Ossian russell trabajo con calma  · Tómese descansos frecuentes. Si es posible, deje de trabajar cuando esté cansada y descanse gigi la hora del almuerzo. · Vaya al baño cada 2 horas. · Cambie de posición con frecuencia. Si está sentada gigi mucho tiempo, póngase de pie y camine. · Si está manrique gigi mucho tiempo, apoye un pie sobre un banco bajo, flexionando la rodilla. Después de estar manrique gigi mucho tiempo, siéntese con los pies elevados.   · Evite las [place], las sustancias químicas y el humo del tabaco.  Viva russell sexualidad a russell manera  · [employer] sexuales gigi el [employer], a menos que russell médico le diga que no. · Podría tener un gran deseo sexual o estar completamente desinteresada. · El abdomen cada vez más matthew podría hacer difícil encontrar donna buena posición gigi el coito. Experimente y explore. · Cuando russell denisse le toque los senos, podría tener contracciones en [place]. · Un masaje en la espalda podría aliviar el dolor de espalda o los cólicos que a veces se sienten después del [place]. Aprenda sobre el trabajo de parto prematuro  · Esté alerta a las señales del trabajo de parto prematuro. Es posible que esté comenzando el [place] de parto si:  ¨ Tiene cólicos similares a los del período menstrual, con o sin náuseas. ¨ Tiene aproximadamente 4 contracciones o más en 20 minutos, o alrededor de 8 o más en 1 hora, incluso después de paige tomado un vaso de agua y estar en reposo. ¨ Tiene un dolor sordo (leve lorena continuo) en la abhijit baja de la espalda que no desaparece cuando cambia de posición. ¨ Siente dolor o presión en la pelvis que aparece y desaparece con determinada regularidad. ¨ Tiene espasmos intestinales o síntomas similares a los de la gripe, con o sin diarrea. ¨ Nota un aumento o un cambio en el flujo vaginal. El flujo podría ser Cee Bongo, tener consistencia mucosa, ser Sheryl Sarna rastros de [place]. ¨ Se le rompe la glo. · Si anaid que ha comenzado un trabajo de parto prematuro:  ¨ Mary 2 o 3 vasos de [place] o jugo. No beber suficientes líquidos puede provocar contracciones. ¨ Detenga lo que esté haciendo, y vacíe la vejiga. Luego, acuéstese sobre el lado ajmes gigi al menos 1 hora. ¨ Mientras descansa de costado, ubique russell esternón. Coloque los dedos en el punto blando ash debajo de él. Mueva los dedos hacia abajo en dirección al ombligo para encontrar la parte superior del [place]. Compruebe si está tenso. ¨ Las contracciones pueden ser débiles o intensas.  Registre destin contracciones gigi Finn Santiago kortneya. [place] contracción desde el comienzo de donna hasta el comienzo de Stacy. ¨ Donna o varias contracciones el que no ocurren con la regularidad de un patrón reciben el nombre de contracciones de [place]. Estas son \"contracciones de práctica\", lorena no indican el inicio del [place]. Por lo general, se detienen si cambia de [place]. ¨ Si tiene contracciones regulares, llame a russell médico.  ¿Dónde puede encontrar más información en inglés? Shiela Infante a http://radha-doug.info/. Ramírez Jean L495 en la búsqueda para aprender más acerca de \"Semanas 26 a 30 de russell embarazo: Instrucciones de cuidado - [ Nadira Rail 26 to 30 of Your Pregnancy: Care Instructions ]. \"  Revisado: 16 marzo, 2017  Versión del contenido: 11.4  © 8700-5591 Healthwise, Incorporated. Las instrucciones de cuidado fueron adaptadas bajo licencia por [employer] (which disclaims liability or warranty for this information). Si usted tiene [place] afección médica o sobre estas instrucciones, siempre pregunte a russell profesional de cristi. Healthwise, Incorporated niega toda garantía o responsabilidad por russell uso de esta información. Prueba oral de tolerancia a la glucosa gigi el embarazo: Sobre esta prueba - [ Oral Glucose Tolerance Test During Pregnancy: About This Test ]  ¿Qué es donna prueba oral de tolerancia a la glucosa? Donna prueba oral de tolerancia a la glucosa (OGTT, por destin siglas en inglés) es donna de las varias pruebas que detectan diabetes gestacional. Es posible que ya le hayan hecho donna prueba de detección distinta de tolerancia a la glucosa (por ejemplo, donna prueba de sobrecarga de glucosa). Para la OGTT, ervin se preparará por adelantado ayunando antes de la prueba. La diabetes gestacional es donna forma de diabetes que puede presentarse gigi el [facility].  Donna OGTT mide la capacidad del organismo de usar un tipo de azúcar, [employer], que es la glo principal de energía del cuerpo. Si russell nivel de azúcar en la franny sube demasiado alto por primera vez cuando está embarazada, usted podría tener diabetes gestacional. La afección suele desaparecer después del nacimiento del bebé. ¿Por qué se hace esta prueba? Cuando tiene la diabetes gestacional, donna hormona llamada insulina no puede mantener el azúcar en la franny dentro de límites normales. Esta afección podría no causar síntomas. Es por eso que es importante que se richie donna prueba para detectarla. La mayoría de las mujeres se hacen pruebas de detección de diabetes gestacional entre las semanas 24 y 29 de embarazo. El nivel alto de azúcar en la franny puede causarles problemas a usted y a russell bebé. Russell bebé podría crecer demasiado, lo que puede causar [employer]. Russell bebé también podría nacer con un nivel bajo de azúcar en la franny, lo cual podría causar convulsiones. Tripp si se entera de que tiene diabetes gestacional, usted puede tratarla con medicamentos y cambios de estilo de zita. Con tratamiento, la mayoría de las mujeres son capaces de controlar russell azúcar en la franny y felicia a doe a bebés sanos. ¿Cómo puede prepararse para la prueba? · Informe a russell médico acerca de todos los medicamentos recetados y sin receta que esté tomando. Es posible que le digan que deje de patsy determinados medicamentos antes de la prueba. · Por 3 días antes de la prueba, siga donna dieta equilibrada que contenga carbohidratos. · No coma, kiran, fume ni richie ejercicio intenso por al menos 8 horas antes de que le tomen la primera [facility]. (Puede beber agua antes de la prueba). · La prueba puede durar hasta 3 horas, además del [employer] lleva al médico verla a usted. Dado que la actividad puede interferir [employer] de la prueba, se le pedirá que permanezca sentada y Cristopher gigi toda la prueba. No coma gigi la prueba. Puede beber [employer]. ¿Qué ocurre gigi la prueba?   · Se le cyril Evins Necessary de franny cuando llega para [employer] prueba. Vonda es turner valor de glucosa en la franny en [place]. Se comparará con otros valores de glucosa en turner franny. · Usted beberá donna pequeña taza de un líquido [place]. · Luego tendrá que esperar por entre 1 y 3 horas para los análisis de franny de seguimiento. El tiempo que espere depende del tipo de OGTT que use el médico.  · Estos análisis de [place] de seguimiento miden cuánta azúcar tiene en la franny. Si no tiene donna cantidad excesiva de azúcar en la franny, no tiene diabetes gestacional. Si tiene demasiada azúcar en la franny, es posible que tenga diabetes gestacional.  · La prueba podría hacerle sentir débil y con hambre o darle malestar estomacal. Dígale a la persona que le está administrando la prueba si usted tiene estos síntomas. · Hay muchas cosas que pueden cambiar turner nivel de azúcar en la franny. Turner médico le preguntará sobre turner cristi en el pasado y hablará de los resultados con usted. ¿Qué ocurre después de la prueba? Si tiene diabetes gestacional:  · Alden vez pueda controlar el azúcar en la [place] cambiando cómo come y haciendo ejercicio regularmente. · Alden vez tenga que controlar turner nivel de azúcar en la franny en el [facility] y [place] pruebas de glucosa de seguimiento. · Alden vez tenga que patsy medicamentos o administrarse inyecciones de insulina para ayudar a controlarse el azúcar en la franny. La atención de seguimiento es donna parte clave de turner tratamiento y seguridad. Asegúrese de hacer y acudir a todas las citas, y llame a turner médico si está teniendo problemas. También es donna buena idea mantener donna lista de los medicamentos que cyril. Pregúntele a turner médico cuándo puede esperar [employer] de la prueba. ¿Dónde puede encontrar más información en inglés? Leny Constantino a http://radha-doug.[email or web address]   Marta Gaytan [id number] en la búsqueda para aprender más acerca de \"Prueba oral de tolerancia a la glucosa gigi el embarazo: Sobre esta eRuben Trammell - [ Oral Glucose Tolerance Test During Pregnancy: About This Test ]. \"  Revisado: 13 marzo, 2017  Versión del contenido: 11.4  © 1540-8174 Healthwise, Incorporated. Las instrucciones de cuidado fueron adaptadas bajo licencia por [employer] (which disclaims liability or warranty for this information). Si usted tiene [place] afección médica o sobre estas instrucciones, siempre pregunte a russell profesional de cristi. [facility], [employer] niega toda garantía o responsabilidad por russell uso de esta información.

## 2018-03-26 NOTE — PROGRESS NOTES
Return OB Visit       Subjective:   Christiano Vang 23 y.o.   COREY: 2018, Date entered prior to episode creation  GA:  26w5d. States she does not have abdominal pain  , contractions, fever, headache , nausea and vomiting, pelvic pressure, right upper quadrant pain  , shortness of breath, swelling, vaginal bleeding , vaginal leaking of fluid  and visual disturbances. Fetal movements are present. She is taking PNV and she is eating healthy. Allergies- reviewed: Allergies   Allergen Reactions    Penicillins Hives         Medications- reviewed:   Current Outpatient Prescriptions   Medication Sig    prenatal vit-calcium-iron-fa (PRENATAL PLUS WITH CALCIUM) 27 mg iron- 1 mg tab Take 1 Tab by mouth daily. No current facility-administered medications for this visit. Past Medical History- reviewed:  History reviewed. No pertinent past medical history. Past Surgical History- reviewed:   History reviewed. No pertinent surgical history. Social History- reviewed:  Social History     Social History    Marital status: SINGLE     Spouse name: N/A    Number of children: N/A    Years of education: N/A     Occupational History    Not on file.      Social History Main Topics    Smoking status: Never Smoker    Smokeless tobacco: Never Used    Alcohol use No    Drug use: No    Sexual activity: Yes     Other Topics Concern    Not on file     Social History Narrative         Immunizations- reviewed:   Immunization History   Administered Date(s) Administered    Influenza Vaccine (Quad) PF 01/15/2018     Tdap Due today        Objective:     Visit Vitals    /67    Pulse 74    Temp 97.7 °F (36.5 °C) (Oral)    Ht 4' 8\" (1.422 m)    Wt 108 lb (49 kg)    LMP 2017    BMI 24.21 kg/m2       Physical Exam:  GENERAL APPEARANCE: alert, well appearing, in no apparent distress  LUNGS: clear to auscultation, no wheezes, rales or rhonchi, symmetric air entry  HEART: regular rate and rhythm, no murmurs  ABDOMEN: soft, nontender, nondistended, no abnormal masses, no epigastric pain, bowel sounds present, fundal height 25 cm, FHT present at 150-155 bpm  BACK: no CVA tenderness  UTERUS: gravid  EXTREMITIES: no redness or tenderness in the calves or thighs, no edema  NEUROLOGICAL: alert, oriented, normal speech, no focal findings or movement disorder noted    Labs    Recent Results (from the past 12 hour(s))   AMB POC URINALYSIS DIP STICK AUTO W/O MICRO    Collection Time: 18  2:48 PM   Result Value Ref Range    Color (UA POC) Yellow     Clarity (UA POC) Clear     Glucose (UA POC) Negative Negative    Bilirubin (UA POC) Negative Negative    Ketones (UA POC) Negative Negative    Specific gravity (UA POC) 1.020 1.001 - 1.035    Blood (UA POC) Negative Negative    pH (UA POC) 7.0 4.6 - 8.0    Protein (UA POC) Negative Negative    Urobilinogen (UA POC) 0.2 mg/dL 0.2 - 1    Nitrites (UA POC) Negative Negative    Leukocyte esterase (UA POC) 1+ Negative         Assessment   22 y.o.   at  26w5d   /67   FH 25 cm  FHTs 150-155 bpm  Urine LE 1+  PNL taking    Plan   1. SIUP at 26w5d by LMP c/w 2nd trimester US. COREY: 2018  · Prenatal labs: RH positive, antibody screen negative, Urine culture negative, CBC ( Hg was 12.4), CMP normal.Rubella/varicella immune. HIV/GC/chlamydia/hepBsAg negative. AFP tetra screen negative  · Continue prenatal vitamins   · Influenza vaccine was given on 1/15/18   · Rechecking CBC today  ·  1 hour glucola today  · Will give Tdap today  · Follow up appointment is scheduled with me on 18 at 3.10 pm.      2. Abnormal second trimester US. Normal male fetal anatomy scan except echogenic bowel. The additional testing was done( Toxo, CMV and CF) which came back negative.  Recommended to repeat US at 34 weeks    Labor precautions discussed, including: Regular painful contractions, lasting for greater than one hour, taking your breath away; any vaginal bleeding; any leakage of fluid; or absent or decreased fetal movement. Call M.D. on call if any of these symptoms or signs occur. I have discussed the diagnosis with the patient and the intended plan as seen in the above orders. The patient has received an after-visit summary and questions were answered concerning future plans. I have discussed medication side effects and warnings with the patient as well. Informed pt to return to the office or go to the ER if she experiences vaginal bleeding, vaginal discharge, leaking of fluid, pelvic cramping. Pt was discussed with Dr. Jose Ramon Cronin, Attending Physician. Cami Baumann MD  Family Medicine Resident, PGY-2. Encounter Diagnoses:    ICD-10-CM ICD-9-CM    1. 26 weeks gestation of pregnancy Z3A.26 V22.2 GLUCOSE, GESTATIONAL 1 HR TOLERANCE      CBC W/O DIFF      AMB POC URINALYSIS DIP STICK AUTO W/O MICRO         2.  Encounter for immunization Z23 V03.89

## 2018-03-26 NOTE — PROGRESS NOTES
Chief Complaint   Patient presents with    Routine Prenatal Visit     1. Have you been to the ER, urgent care clinic since your last visit? Hospitalized since your last visit? No    2. Have you seen or consulted any other health care providers outside of the [address] [place] [address] since your last visit? Include any pap smears or colon screening.  No       No abnormal bleeding or discharge    Can feel baby move    GTT1:35 labs drawn

## 2018-03-26 NOTE — MR AVS SNAPSHOT
[address] 
[phone] Patient: Rosalba Neves MRN: [id number] :1995 Visit Information Date & Time Provider Department Dept. Phone Encounter #  
 3/26/2018  2:25 PM Tino Mckeon MD [facility]5 [facility] [phone] [id number] Your Appointments 2018  3:10 PM  
ROUTINE CARE with Tino Mckeon MD  
[address]) Appt Note: routine prenatal  
 3300 [facility] [address] [place] [address]  
[phone]  
  
   
 [address] [place] [address] 3 [facility] 99 [address] Upcoming Health Maintenance Date Due Hepatitis A Peds Age 1-18 (1 of 2 - Standard Series) 1998 DTaP/Tdap/Td series (1 - Tdap) 2004 HPV AGE 9Y-26Y (1 of 3 - Female 3 Dose Series) 2008 Allergies as of 3/26/2018  Review Complete On: 3/26/2018 By: Franklin Nguyễn LPN Severity Noted Reaction Type Reactions Penicillins  01/15/2018    Hives Current Immunizations  Reviewed on 2018 Name Date Influenza Vaccine (Quad) PF 1/15/2018 Not reviewed this visit You Were Diagnosed With   
  
 Codes Comments 26 weeks gestation of pregnancy    -  Primary ICD-10-CM: Z3A.26 
ICD-9-CM: V22.2 Encounter for immunization     ICD-10-CM: S55 ICD-9-CM: V03.89 Vitals BP Pulse Temp Height(growth percentile) Weight(growth percentile) LMP  
 117/67 74 97.7 °F (36.5 °C) (Oral) 4' 8\" (1.422 m) 108 lb (49 kg) 2017 BMI OB Status Smoking Status 24.21 kg/m2 Pregnant Never Smoker Vitals History BMI and BSA Data Body Mass Index Body Surface Area  
 24.21 kg/m 2 1.39 m 2 Preferred Pharmacy Pharmacy Name Phone [id number] [place], [address] [phone] Your Updated Medication List  
  
   
 This list is accurate as of 3/26/18  2:57 PM.  Always use your most recent med list.  
  
  
  
  
 prenatal vit-calcium-iron-fa 27 mg iron- 1 mg Tab Commonly known as:  PRENATAL PLUS with CALCIUM Take 1 Tab by mouth daily. We Performed the Following AMB POC URINALYSIS DIP STICK AUTO W/O MICRO [45845 CPT(R)] CBC W/O DIFF [83517 CPT(R)] GLUCOSE, GESTATIONAL 1 HR TOLERANCE [94943 CPT(R)] Patient Instructions Semanas 26 a 30 de russell embarazo: Instrucciones de cuidado - [ Rosy Sida 26 to 30 of Your Pregnancy: Care Instructions ] Instrucciones de cuidado Ahora usted se encuentra en el último trimestre del [facility]. Russell bebé está creciendo rápidamente. Y es probable que sienta que russell bebé se mueve con más frecuencia. Es posible que russell médico le diga que cuente la cantidad de patadas que da russell bebé. La espalda puede dolerle mientras russell cuerpo se acostumbra al tamaño y a la longitud de russell bebé. Si todavía no le harris aplicado la vacuna Tdap (tétanos, difteria y tos [place]) gigi fausto [facility], hable con russell médico acerca de aplicársela. Trinda Amina a proteger a russell recién nacido contra la infección por tos ferina. Gigi fausto tiempo, es importante que se cuide y preste atención a lo que russell cuerpo necesita. Si tiene [employer], busque formas de estar con russell denisse que satisfagan russell nivel de comodidad y deseo. Utilice las recomendaciones proporcionadas en esta hoja de cuidados para encontrar russell propia manera de vivir la sexualidad. La atención de seguimiento es donna parte clave de russell tratamiento y seguridad. Asegúrese de hacer y acudir a todas las citas, y llame a russell médico si está teniendo problemas. También es donna buena idea saber los resultados de los exámenes y mantener donna lista de los medicamentos que cyril. Cómo puede cuidarse en el hogar? [place] russell trabajo con calma · Tómese descansos frecuentes.  Si es posible, deje de trabajar cuando esté cansada y descanse gigi la hora del almuerzo. · Vaya al baño cada 2 horas. · Cambie de posición con frecuencia. Si está sentada gigi mucho tiempo, póngase de pie y camine. · Si está manrique gigi mucho tiempo, apoye un pie sobre un banco bajo, flexionando la rodilla. Después de estar manrique gigi mucho tiempo, siéntese con los pies elevados. · [address], las sustancias químicas y el humo del tabaco. 
Lorella Copa russell sexualidad a russell Maryfrances Forward · [employer] sexuales gigi el embarazo está emi, a menos que russell médico le diga que no. · Podría tener un gran deseo sexual o estar completamente desinteresada. · El abdomen cada vez más matthew podría hacer difícil encontrar donna buena posición gigi el coito. Experimente y explore. · Cuando russell denisse le toque los senos, podría tener contracciones en [place]. · Un masaje en la espalda podría aliviar el dolor de espalda o los cólicos que a veces se sienten después del [place]. Aprenda sobre el trabajo de [place] prematuro · Esté alerta a las señales del trabajo de [place]. Es posible que esté comenzando el [place] de parto si: ¨ Tiene cólicos similares a los del período menstrual, con o sin náuseas. ¨ Tiene aproximadamente 4 contracciones o más en 20 minutos, o alrededor de 8 o más en 1 hora, incluso después de paige tomado un vaso de agua y estar en reposo. ¨ Tiene un dolor sordo (leve lorena continuo) en la abhijit baja de la espalda que no desaparece cuando cambia de posición. ¨ Siente dolor o presión en la pelvis que aparece y desaparece con determinada regularidad. ¨ Tiene espasmos intestinales o síntomas similares a los de la gripe, con o sin diarrea. ¨ Nota un aumento o un cambio en el flujo vaginal. El flujo podría ser Glean Liliana, tener consistencia mucosa, ser Virgle Danker rastros de [place]. ¨ Se le rompe la glo. · Si anaid que ha comenzado un trabajo de [place] prematuro: ¨ Mary 2 o 3 vasos de [place]. No beber suficientes líquidos puede provocar contracciones. ¨ Detenga lo que esté haciendo, y vacíe la vejiga. Luego, acuéstese sobre el lado james gigi al menos 1 hora. ¨ Mientras descansa de costado, ubique russell esternón. Coloque los dedos en el punto blando ash debajo de él. Mueva los dedos hacia abajo en dirección al ombligo para encontrar la parte superior del [place]. Compruebe si está tenso. ¨ Las contracciones pueden ser débiles o intensas. Registre destin contracciones gigi donna hora. Cuente donna contracción desde el comienzo de donna hasta el comienzo de [place]. ¨ Donna o varias contracciones el que no ocurren con la regularidad de un patrón reciben el nombre de contracciones de [place]. Estas son \"contracciones de práctica\", lorena no indican el inicio del [facility] [place]. Por lo general, se detienen si cambia de [place]. ¨ Si tiene contracciones regulares, llame a russell médico. 

Dónde puede encontrar más información en inglés? Saundra Viveros a http://radha-doug.info/. Anita Herrera [id number] en la búsqueda para aprender más acerca de \"Semanas 26 a 30 de russell embarazo: Instrucciones de cuidado - [ Maya Dowdy 26 to 30 of Your Pregnancy: Care Instructions ]. \" 
Revisado: 16 marzo, 2017 Versión del contenido: 11.4 © 7855-2654 Healthwise, Incorporated. Las instrucciones de cuidado fueron adaptadas bajo licencia por Good Help Connections (which disclaims liability or warranty for this information). Si usted tiene [place] afección médica o sobre estas instrucciones, siempre pregunte a russell profesional de cristi. [facility] niega toda garantía o responsabilidad por russell uso de esta información. Prueba oral de tolerancia a la glucosa gigi el embarazo: Sobre esta prueba - [ Oral Glucose Tolerance Test During Pregnancy: About This Test ] Qué es donna prueba oral de tolerancia a la glucosa? Donna prueba oral de tolerancia a la glucosa (OGTT, por destin siglas en inglés) es donna de las varias pruebas que detectan diabetes gestacional. Es posible que ya le hayan hecho donna prueba de detección distinta de tolerancia a la glucosa (por ejemplo, donna prueba de sobrecarga de glucosa). Para la OGTT, usted se preparará por adelantado ayunando antes de la prueba. La diabetes gestacional es donna forma de diabetes que puede presentarse gigi el Clement Tur[place]. Donna OGTT mide la capacidad del organismo de usar un tipo de azúcar, llamado glucosa, que es la glo principal de energía del cuerpo. Si russell nivel de azúcar en la franny sube demasiado alto por primera vez cuando está embarazada, usted podría tener diabetes gestacional. La afección suele desaparecer después del nacimiento del bebé. Por qué se hace esta prueba? Cuando tiene la diabetes gestacional, donna hormona llamada insulina no puede mantener el azúcar en la franny dentro de límites normales. Esta afección podría no causar síntomas. Es por eso que es importante que se richie donna prueba para detectarla. La mayoría de las mujeres se hacen pruebas de detección de diabetes gestacional entre las semanas 24 y 29 de embarazo. El nivel alto de azúcar en la franny puede causarles problemas a usted y a russell bebé. Russell bebé podría crecer demasiado, lo que puede causar [employer]. Russell bebé también podría nacer con un nivel bajo de azúcar en la franny, lo cual podría causar convulsiones. Tripp si se entera de que tiene diabetes gestacional, usted puede tratarla con medicamentos y cambios de estilo de zita. Con tratamiento, la mayoría de las mujeres son capaces de controlar russell azúcar en la franny y felicia a doe a bebés sanos. Cómo puede prepararse para la prueba? · Informe a russell médico acerca de todos los medicamentos recetados y sin receta que esté tomando. Es posible que le digan que deje de patsy determinados medicamentos antes de la prueba. · Por 3 días antes de la prueba, siga donna dieta equilibrada que contenga carbohidratos. · No coma, kiran, fume ni richie ejercicio intenso por al menos 8 horas antes de que le tomen la primera [place]. (Puede beber agua antes de la prueba). · La prueba puede durar hasta 3 horas, además del [employer] lleva al médico verla a usted. Dado que la actividad puede interferir [employer] de la prueba, se le pedirá que permanezca sentada y Cristopher gigi toda la prueba. No coma gigi la prueba. Puede beber [employer]. Blondell Klarissa gigi la prueba? · Se le cyril donna muestra de franny cuando llega para hacerse la prueba. Vonda es russell valor de glucosa en la franny en [place]. Se comparará con otros valores de glucosa en russell franny. · Usted beberá donna pequeña taza de un líquido [place]. · Luego tendrá que esperar por entre 1 y 3 horas para los análisis de franny de seguimiento. El tiempo que espere depende del tipo de OGTT que use el médico. 
· Estos análisis de [place] de seguimiento miden cuánta azúcar tiene en la franny. Si no tiene donna cantidad excesiva de azúcar en la franny, no tiene diabetes gestacional. Si tiene demasiada azúcar en la franny, es posible que tenga diabetes gestacional. 
· La prueba podría hacerle sentir débil y con hambre o darle malestar estomacal. Dígale a la persona que le está administrando la prueba si usted tiene estos síntomas. · Hay muchas cosas que pueden cambiar russell nivel de azúcar en la franny. Russell médico le preguntará sobre russell cristi en el pasado y hablará de los resultados con usted. Luly Klarissa después de la prueba? Si tiene diabetes gestacional: · Alden vez pueda controlar el azúcar en la [place] cambiando cómo come y haciendo ejercicio regularmente. · Alden vez tenga que controlar russell nivel de azúcar en la franny en el [facility] y [place] pruebas de glucosa de seguimiento.  
· Alden vez tenga que patsy medicamentos o administrarse inyecciones de insulina para ayudar a controlarse el azúcar en la franny. La atención de seguimiento es donna parte clave de russell tratamiento y seguridad. Asegúrese de hacer y acudir a todas las citas, y llame a russell médico si está teniendo problemas. También es donna buena idea mantener donna lista de los medicamentos que cyril. Pregúntele a russell médico cuándo puede esperar [employer] de la prueba. Dónde puede encontrar más información en inglés? Drew Davis a http://garcia-doug.info/. Love Miguel R901 en la búsqueda para aprender más acerca de \"Prueba oral de tolerancia a la glucosa gigi el embarazo: Sobre esta prueba - [ Oral Glucose Tolerance Test During Pregnancy: About This Test ]. \" 
Revisado: 13 marzo, 2017 Versión del contenido: 11.4 © 7865-1737 Healthwise, Incorporated. Las instrucciones de cuidado fueron adaptadas bajo licencia por Good Help Connections (which disclaims liability or warranty for this information). Si usted tiene [place] afección médica o sobre estas instrucciones, siempre pregunte a russell profesional de cristi. Healthwise, Incorporated niega toda garantía o responsabilidad por russell uso de esta información. Introducing [facility] [place] [facility]! [facility] introduces Onyu patient portal. Now you can access parts of your medical record, email your doctor's office, and request medication refills online. 1. In your internet browser, go to [email or web address] 2. Click on the First Time User? Click Here link in the Sign In box. You will see the New Member Sign Up page. 3. Enter your Onyu Access Code exactly as it appears below. You will not need to use this code after youve completed the sign-up process. If you do not sign up before the expiration date, you must request a new code. · Onyu Access Code: [email or web address] Expires: 4/15/2018 10:14 AM 
 
4.  Enter the last four digits of your Social Security Number (xxxx) and Date of Birth (mm/dd/yyyy) as indicated and click Submit. You will be taken to the next sign-up page. 5. Create a Ideacentric ID. This will be your Ideacentric login ID and cannot be changed, so think of one that is secure and easy to remember. 6. Create a Ideacentric password. You can change your password at any time. 7. Enter your Password Reset Question and Answer. This can be used at a later time if you forget your password. 8. Enter your e-mail address. You will receive e-mail notification when new information is available in [address] 9. Click Sign Up. You can now view and download portions of your medical record. 10. Click the Download Summary menu link to download a portable copy of your medical information. If you have questions, please visit the Frequently Asked Questions section of the Ideacentric website. Remember, Ideacentric is NOT to be used for urgent needs. For medical emergencies, dial 911. Now available from your iPhone and Android! Please provide this summary of care documentation to your next provider. Your primary care clinician is listed as Avis Moore. If you have any questions after today's visit, please call [phone].

## 2018-03-27 LAB
ERYTHROCYTE [DISTWIDTH] IN BLOOD BY AUTOMATED COUNT: 13.9 % (ref 12.3–15.4)
GLUCOSE 1H P 50 G GLC PO SERPL-MCNC: 111 MG/DL (ref 65–139)
HCT VFR BLD AUTO: 37.2 % (ref 34–46.6)
HGB BLD-MCNC: 13 G/DL (ref 11.1–15.9)
MCH RBC QN AUTO: 31.5 PG (ref 26.6–33)
MCHC RBC AUTO-ENTMCNC: 34.9 G/DL (ref 31.5–35.7)
MCV RBC AUTO: 90 FL (ref 79–97)
PLATELET # BLD AUTO: 343 X10E3/UL (ref 150–379)
RBC # BLD AUTO: 4.13 X10E6/UL (ref 3.77–5.28)
WBC # BLD AUTO: 11.3 X10E3/UL (ref 3.4–10.8)

## 2018-04-24 ENCOUNTER — OFFICE VISIT (OUTPATIENT)
Dept: FAMILY MEDICINE CLINIC | Age: 23
End: 2018-04-24

## 2018-04-24 VITALS
WEIGHT: 116 LBS | DIASTOLIC BLOOD PRESSURE: 67 MMHG | RESPIRATION RATE: 17 BRPM | TEMPERATURE: 98.2 F | HEART RATE: 84 BPM | HEIGHT: 56 IN | SYSTOLIC BLOOD PRESSURE: 106 MMHG | OXYGEN SATURATION: 99 % | BODY MASS INDEX: 26.1 KG/M2

## 2018-04-24 DIAGNOSIS — Z3A.30 30 WEEKS GESTATION OF PREGNANCY: Primary | ICD-10-CM

## 2018-04-24 LAB
BILIRUB UR QL STRIP: NEGATIVE
GLUCOSE UR-MCNC: NEGATIVE MG/DL
KETONES P FAST UR STRIP-MCNC: NEGATIVE MG/DL
PH UR STRIP: 7 [PH] (ref 4.6–8)
PROT UR QL STRIP: NEGATIVE
SP GR UR STRIP: 1.01 (ref 1–1.03)
UA UROBILINOGEN AMB POC: NORMAL (ref 0.2–1)
URINALYSIS CLARITY POC: NORMAL
URINALYSIS COLOR POC: YELLOW
URINE BLOOD POC: NEGATIVE
URINE LEUKOCYTES POC: NORMAL
URINE NITRITES POC: NEGATIVE

## 2018-04-24 NOTE — PATIENT INSTRUCTIONS
Weeks 30 to 32 of Your Pregnancy: Care Instructions  Your Care Instructions    You have made it to the final months of your pregnancy. By now, your baby is really starting to look like a baby, with hair and plump skin. As you enter the final weeks of pregnancy, the reality of having a baby may start to set in. This is the time to settle on a name, get your household in order, set up a safe nursery, and find quality  if needed. Doing these things in advance will allow you to focus on caring for and enjoying your new baby. You may also want to have a tour of your hospital's labor and delivery unit to get a better idea of what to expect while you are in the hospital.  During these last months, it is very important to take good care of yourself and pay attention to what your body needs. If your doctor says it is okay for you to work, don't push yourself too hard. Use the tips provided in this care sheet to ease heartburn and care for varicose veins. If you haven't already had the Tdap shot during this pregnancy, talk to your doctor about getting it. It will help protect your  against pertussis infection. Follow-up care is a key part of your treatment and safety. Be sure to make and go to all appointments, and call your doctor if you are having problems. It's also a good idea to know your test results and keep a list of the medicines you take. How can you care for yourself at home? Pay attention to your baby's movements  · You should feel your baby move several times every day. · Your baby now turns less, and kicks and jabs more. · Your baby sleeps 20 to 45 minutes at a time and is more active at certain times of day. · If your doctor wants you to count your baby's kicks:  ¨ Empty your bladder, and lie on your side or relax in a comfortable chair. ¨ Write down your start time. ¨ Pay attention only to your baby's movements. Count any movement except hiccups.   ¨ After you have counted 10 movements, write down your stop time. ¨ Write down how many minutes it took for your baby to move 10 times. ¨ If an hour goes by and you have not recorded 10 movements, have something to eat or drink and then count for another hour. If you do not record 10 movements in either hour, call your doctor. Ease heartburn  · Eat small, frequent meals. · Do not eat chocolate, peppermint, or very spicy foods. Avoid drinks with caffeine, such as coffee, tea, and sodas. · Avoid bending over or lying down after meals. · Talk a short walk after you eat. · If heartburn is a problem at night, do not eat for 2 hours before bedtime. · Take antacids like Mylanta, Maalox, Rolaids, or Tums. Do not take antacids that have sodium bicarbonate. Care for varicose veins  · Varicose veins are blood vessels that stretch out with the extra blood during pregnancy. Your legs may ache or throb. Most varicose veins will go away after the birth. · Avoid standing for long periods of time. Sit with your legs crossed at the ankles, not the knees. · Sit with your feet propped up. · Avoid tight clothing or stockings. Wear support hose. · Exercise regularly. Try walking for at least 30 minutes a day. Where can you learn more? Go to http://radha-doug.info/. Enter O708 in the search box to learn more about \"Weeks 30 to 32 of Your Pregnancy: Care Instructions. \"  Current as of: March 16, 2017  Content Version: 11.4  © 3658-5427 [employer]ight. Care instructions adapted under license by [employer] (which disclaims liability or warranty for this information). If you have questions about a medical condition or this instruction, always ask your healthcare professional. [place] [address] any warranty or liability for your use of this information.
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
## 2018-04-24 NOTE — PROGRESS NOTES
Return OB Visit       Subjective:   Monica Hummel 23 y.o.   COREY: 2018, Date entered prior to episode creation  GA:  30w6d. States she does not have abdominal pain  , chest pain, contractions, fever, headache , nausea and vomiting, pelvic pressure, right upper quadrant pain  , shortness of breath, swelling, vaginal bleeding , vaginal leaking of fluid  and visual disturbances. Fetal movements are not present. She is taking PNV and she is eating healthy. Allergies- reviewed: Allergies   Allergen Reactions    Penicillins Hives         Medications- reviewed:   Current Outpatient Prescriptions   Medication Sig    prenatal vit-calcium-iron-fa (PRENATAL PLUS WITH CALCIUM) 27 mg iron- 1 mg tab Take 1 Tab by mouth daily. No current facility-administered medications for this visit. Past Medical History- reviewed:  History reviewed. No pertinent past medical history. Past Surgical History- reviewed:   History reviewed. No pertinent surgical history. Social History- reviewed:  Social History     Social History    Marital status: SINGLE     Spouse name: N/A    Number of children: N/A    Years of education: N/A     Occupational History    Not on file.      Social History Main Topics    Smoking status: Never Smoker    Smokeless tobacco: Never Used    Alcohol use No    Drug use: No    Sexual activity: Yes     Other Topics Concern    Not on file     Social History Narrative         Immunizations- reviewed:   Immunization History   Administered Date(s) Administered    Influenza Vaccine (Quad) PF 01/15/2018    Tdap 2018         Objective:     Visit Vitals    /67    Pulse 84    Temp 98.2 °F (36.8 °C) (Oral)    Resp 17    Ht 4' 8\" (1.422 m)    Wt 116 lb (52.6 kg)    LMP 2017    SpO2 99%    BMI 26.01 kg/m2       Physical Exam:  GENERAL APPEARANCE: alert, well appearing, in no apparent distress  LUNGS: clear to auscultation, no wheezes, rales or rhonchi, symmetric air entry  HEART: regular rate and rhythm, no murmurs  ABDOMEN: soft, nontender, nondistended, no abnormal masses, no epigastric pain, bowel sounds present, fundal height 29 cm, FHT present at 140-145 bpm  BACK: no CVA tenderness  UTERUS: gravid  EXTREMITIES: no redness or tenderness in the calves or thighs, no edema  NEUROLOGICAL: alert, oriented, normal speech, no focal findings or movement disorder noted    Labs  Recent Results (from the past 12 hour(s))   AMB POC URINALYSIS DIP STICK AUTO W/O MICRO    Collection Time: 18  3:25 PM   Result Value Ref Range    Color (UA POC) Yellow     Clarity (UA POC) Cloudy     Glucose (UA POC) Negative Negative    Bilirubin (UA POC) Negative Negative    Ketones (UA POC) Negative Negative    Specific gravity (UA POC) 1.015 1.001 - 1.035    Blood (UA POC) Negative Negative    pH (UA POC) 7.0 4.6 - 8.0    Protein (UA POC) Negative Negative    Urobilinogen (UA POC) 0.2 mg/dL 0.2 - 1    Nitrites (UA POC) Negative Negative    Leukocyte esterase (UA POC) 1+ Negative         Assessment   22 y.o.   at  30w6d   /67   FH 29 cm  FHTs 140-145 bpm  Urine LE 1+   PNL Taking      Plan      1. SIUP at 30W6d by LMP c/w 2nd trimester US. COREY: 2018  · Prenatal labs: RH positive, antibody screen negative, Urine culture negative, CBC on 3/27/18 ( Hg was 13.0, Platelets 382), CMP normal.Rubella/varicella immune. 1hour GTT WNL. HIV/GC/chlamydia/hepBsAg negative. AFP tetra screen negative  · Continue prenatal vitamins   · Influenza vaccine was given on 1/15/18   · S/p Tdap on 3/28/2018  · Follow up appointment is scheduled with me on  2018 at 11.05 am      2. Abnormal second trimester US. Normal male fetal anatomy scan except echogenic bowel. The additional testing was done( Toxo, CMV and CF) which came back negative.  Recommended to repeat US at 34 weeks    Labor precautions discussed, including: Regular painful contractions, lasting for greater than one hour, taking your breath away; any vaginal bleeding; any leakage of fluid; or absent or decreased fetal movement. Call M.D. on call if any of these symptoms or signs occur. I have discussed the diagnosis with the patient and the intended plan as seen in the above orders. The patient has received an after-visit summary and questions were answered concerning future plans. I have discussed medication side effects and warnings with the patient as well. Informed pt to return to the office or go to the ER if she experiences vaginal bleeding, vaginal discharge, leaking of fluid, pelvic cramping. Pt was discussed with Dr. Ivette Curtis, Attending Physician.     Kemar Ceja MD  Family Medicine Resident, PGY-2      Encounter Diagnoses:    ICD-10-CM ICD-9-CM    1. 30 weeks gestation of pregnancy Z3A.30 V22.2 AMB POC URINALYSIS DIP STICK AUTO W/O MICRO      CULTURE, URINE

## 2018-04-24 NOTE — PROGRESS NOTES
Chief Complaint   Patient presents with    Routine Prenatal Visit     1. Have you been to the ER, urgent care clinic since your last visit? Hospitalized since your last visit? No    2. Have you seen or consulted any other health care providers outside of the [address] [place] [address] since your last visit? Include any pap smears or colon screening.  No       No abnormal bleeding or discharge    Can feel baby move

## 2018-04-24 NOTE — MR AVS SNAPSHOT
[address] 
[phone] Patient: Prudence Flow MRN: [id number] :1995 Visit Information Date & Time Provider Department Dept. Phone Encounter #  
 2018  3:10 PM Madyson Hirsch MD [facility] [facility] [phone] [id number] 2018 11:05 AM  
OB VISIT with Madyson Hirsch MD  
[address]) Appt Note: routine prenatal  
 [address] [place] [address]  
[phone]  
  
   
 [address] [place] [facility] [phone] Upcoming Health Maintenance Date Due Hepatitis A Peds Age 1-18 (1 of 2 - Standard Series) 1998 HPV Age 9Y-34Y (1 of 3 - Female 3 Dose Series) 2008 DTaP/Tdap/Td series (2 - Td) 2018 Allergies as of 2018  Review Complete On: 2018 By: Johanna Severino LPN Severity Noted Reaction Type Reactions Penicillins  01/15/2018    Hives Current Immunizations  Reviewed on 2018 Name Date Influenza Vaccine (Quad) PF 1/15/2018 Tdap 3/27/2018 Not reviewed this visit You Were Diagnosed With   
  
 Codes Comments 30 weeks gestation of pregnancy    -  Primary ICD-10-CM: Z3A.30 ICD-9-CM: V22.2 Vitals BP Pulse Temp Resp Height(growth percentile) Weight(growth percentile) 106/67 84 98.2 °F (36.8 °C) (Oral) 17 4' 8\" (1.422 m) 116 lb (52.6 kg) LMP SpO2 BMI OB Status Smoking Status 2017 99% 26.01 kg/m2 Pregnant Never Smoker Vitals History BMI and BSA Data Body Mass Index Body Surface Area 26.01 kg/m 2 1.44 m 2 Preferred Pharmacy Pharmacy Name Phone [id number] [address] [facility] [phone] Your Updated Medication List  
  
   
This list is accurate as of 18  3:35 PM.  Always use your most recent med list.  
  
  
 prenatal vit-calcium-iron-fa 27 mg iron- 1 mg Tab Commonly known as:  PRENATAL PLUS with CALCIUM Take 1 Tab by mouth daily. We Performed the Following AMB POC URINALYSIS DIP STICK AUTO W/O MICRO [47394 CPT(R)] CULTURE, URINE [id number] CPT(R)] Patient Instructions Weeks 30 to 32 of Your Pregnancy: Care Instructions Your Care Instructions You have made it to the final months of your pregnancy. By now, your baby is really starting to look like a baby, with hair and plump skin. As you enter the final weeks of pregnancy, the reality of having a baby may start to set in. This is the time to settle on a name, get your household in order, set up a safe nursery, and find quality  if needed. Doing these things in advance will allow you to focus on caring for and enjoying your new baby. You may also want to have a tour of your hospital's labor and delivery unit to get a better idea of what to expect while you are in the hospital. 
During these last months, it is very important to take good care of yourself and pay attention to what your body needs. If your doctor says it is okay for you to work, don't push yourself too hard. Use the tips provided in this care sheet to ease heartburn and care for varicose veins. If you haven't already had the Tdap shot during this pregnancy, talk to your doctor about getting it. It will help protect your  against pertussis infection. Follow-up care is a key part of your treatment and safety. Be sure to make and go to all appointments, and call your doctor if you are having problems. It's also a good idea to know your test results and keep a list of the medicines you take. How can you care for yourself at home? Pay attention to your baby's movements · You should feel your baby move several times every day. · Your baby now turns less, and kicks and jabs more. · Your baby sleeps 20 to 45 minutes at a time and is more active at certain times of day. · If your doctor wants you to count your baby's kicks: 
¨ Empty your bladder, and lie on your side or relax in a comfortable chair. ¨ Write down your start time. ¨ Pay attention only to your baby's movements. Count any movement except hiccups. ¨ After you have counted 10 movements, write down your stop time. ¨ Write down how many minutes it took for your baby to move 10 times. ¨ If an hour goes by and you have not recorded 10 movements, have something to eat or drink and then count for another hour. If you do not record 10 movements in either hour, call your doctor. Ease heartburn · Eat small, frequent meals. · Do not eat chocolate, peppermint, or very spicy foods. Avoid drinks with caffeine, such as coffee, tea, and sodas. · Avoid bending over or lying down after meals. · Talk a short walk after you eat. · If heartburn is a problem at night, do not eat for 2 hours before bedtime. · Take antacids like Mylanta, Maalox, Rolaids, or Tums. Do not take antacids that have sodium bicarbonate. Care for varicose veins · Varicose veins are blood vessels that stretch out with the extra blood during pregnancy. Your legs may ache or throb. Most varicose veins will go away after the birth. · Avoid standing for long periods of time. Sit with your legs crossed at the ankles, not the knees. · Sit with your feet propped up. · Avoid tight clothing or stockings. Wear support hose. · Exercise regularly. Try walking for at least 30 minutes a day. Where can you learn more? Go to http://radha-doug.info/. Enter A363 in the search box to learn more about \"Weeks 30 to 32 of Your Pregnancy: Care Instructions. \" Current as of: March 16, 2017 Content Version: 11.4 © 0681-5285 Skift.  Care instructions adapted under license by [employer] (which disclaims liability or warranty for this information). If you have questions about a medical condition or this instruction, always ask your healthcare professional. [address] any warranty or liability for your use of this information. Introducing [facility]! [facility] introduces Funsherpa patient portal. Now you can access parts of your medical record, email your doctor's office, and request medication refills online. 1. In your internet browser, go to [email or web address] 2. Click on the First Time User? Click Here link in the Sign In box. You will see the New Member Sign Up page. 3. Enter your Funsherpa Access Code exactly as it appears below. You will not need to use this code after youve completed the sign-up process. If you do not sign up before the expiration date, you must request a new code. · Funsherpa Access Code: [email or web address] Expires: 7/23/2018  3:32 PM 
 
4. Enter the last four digits of your Social Security Number (xxxx) and Date of Birth (mm/dd/yyyy) as indicated and click Submit. You will be taken to the next sign-up page. 5. Create a Funsherpa ID. This will be your Funsherpa login ID and cannot be changed, so think of one that is secure and easy to remember. 6. Create a Funsherpa password. You can change your password at any time. 7. Enter your Password Reset Question and Answer. This can be used at a later time if you forget your password. 8. Enter your e-mail address. You will receive e-mail notification when new information is available in [address] 9. Click Sign Up. You can now view and download portions of your medical record. 10. Click the Download Summary menu link to download a portable copy of your medical information. If you have questions, please visit the Frequently Asked Questions section of the Funsherpa website. Remember, Funsherpa is NOT to be used for urgent needs. For medical emergencies, dial 911. Now available from your iPhone and Android! Please provide this summary of care documentation to your next provider. Your primary care clinician is listed as Shasta Bowser. If you have any questions after today's visit, please call [phone].

## 2018-04-26 LAB — BACTERIA UR CULT: NORMAL

## 2018-05-07 ENCOUNTER — ROUTINE PRENATAL (OUTPATIENT)
Dept: FAMILY MEDICINE CLINIC | Age: 23
End: 2018-05-07

## 2018-05-07 VITALS
RESPIRATION RATE: 18 BRPM | OXYGEN SATURATION: 98 % | TEMPERATURE: 98.2 F | HEIGHT: 56 IN | HEART RATE: 80 BPM | WEIGHT: 120.6 LBS | BODY MASS INDEX: 27.13 KG/M2 | SYSTOLIC BLOOD PRESSURE: 112 MMHG | DIASTOLIC BLOOD PRESSURE: 69 MMHG

## 2018-05-07 DIAGNOSIS — Z3A.32 32 WEEKS GESTATION OF PREGNANCY: Primary | ICD-10-CM

## 2018-05-07 DIAGNOSIS — R11.0 NAUSEA: ICD-10-CM

## 2018-05-07 LAB
BILIRUB UR QL STRIP: NEGATIVE
GLUCOSE UR-MCNC: NEGATIVE MG/DL
KETONES P FAST UR STRIP-MCNC: NEGATIVE MG/DL
PH UR STRIP: 7 [PH] (ref 4.6–8)
PROT UR QL STRIP: NEGATIVE
SP GR UR STRIP: 1.02 (ref 1–1.03)
UA UROBILINOGEN AMB POC: NORMAL (ref 0.2–1)
URINALYSIS CLARITY POC: NORMAL
URINALYSIS COLOR POC: YELLOW
URINE BLOOD POC: NEGATIVE
URINE LEUKOCYTES POC: NORMAL
URINE NITRITES POC: NEGATIVE

## 2018-05-07 RX ORDER — LANOLIN ALCOHOL/MO/W.PET/CERES
50 CREAM (GRAM) TOPICAL
Qty: 90 TAB | Refills: 0 | Status: SHIPPED | OUTPATIENT
Start: 2018-05-07 | End: 2018-06-03

## 2018-05-07 NOTE — PATIENT INSTRUCTIONS
Semanas 32 a 34 de turner embarazo: Instrucciones de cuidado - [ Clarice Cools 32 to 29 of Your Pregnancy: Care Instructions ]  Instrucciones de cuidado    Noel las últimas semanas de turner ervin White podría sentir más darlyn y [employer]. Es importante que descanse cuando pueda. Turner bebé en crecimiento está ejerciendo más presión sobre turner vejiga. Por eso, janna vez necesite orinar con más frecuencia. Las hemorroides también son comunes. Estas son venas en la abhijit del recto que causan dolor y comezón. En la semana 36, a la mayoría de las [employer]on un análisis para detectar el estreptococo del mercy B (GBS, por destin siglas en inglés). El GBS es donna bacteria común que puede vivir en la vagina y el recto. Podría enfermar a turner bebé después del nacimiento. Si el [employer] positivo, le darán antibióticos noel el [place] de Sherlyn. Estos prevendrán que el bebé se contagie con la bacteria. Podría convenirle hablar con turner médico sobre poner en un banco la franny del cordón umbilical de turner bebé. Esta es la franny que queda en el cordón después del nacimiento. Si desea guardar esta franny, debe hacer los trámites necesarios con anticipación. No puede decidirlo en el último minuto. Si todavía no le harris aplicado la vacuna Tdap (tétanos, difteria y tos [place]) noel fausto Christopher, hable con turner médico acerca de aplicársela. Beth Villarreal a proteger a turner recién nacido contra la infección por tos ferina. La atención de seguimiento es donna parte clave de turner tratamiento y seguridad. Asegúrese de hacer y acudir a todas las citas, y llame a turner médico si está teniendo problemas. También es donna buena idea saber los resultados de los exámenes y mantener donna lista de los medicamentos que cyril. ¿Cómo puede cuidarse en el hogar? Alivio de las hemorroides  · Aumente en turner dieta la cantidad de líquidos, frutas, verduras y Tracie. [place] ayudará a Stormy Grace. · Evite estar sentada noel demasiado tiempo.  Recuéstese sobre el lado james varias veces al día. · Límpiese con papel higiénico suave y húmedo. O puede utilizar compresas de infusión de hamamelis [facility] (\"witch hazel\") o toallas de higiene personal.  · Si tiene malestar, pruebe a usar compresas de hielo. O puede hacer mary de asiento tibios. Hágalos gigi 20 minutos por vez, según lo necesite. · Use donna crema con hidrocortisona para el dolor y la picazón. [place] Lu son Anusol y Preparation H Hydrocortisone. · Pregúntele a russell médico si puede patsy un ablandador de heces de venta charles. Considere el amamantamiento  · Los expertos recomiendan que las mujeres amamanten por Tobi Strange. La leche materna es el mejor alimento para los bebés. · A los bebés les resulta más fácil digerir la AT[employer]T materna que la AT[employer]T de [place]. Y siempre está disponible, tiene la temperatura ideal y es gratuita. · En general, los bebés que se alimentan con [address] son más sanos que los bebés que se alimentan con leche de T[place]. Luigi son menos propensos a tener infecciones de oído, resfriados, diarrea y neumonía. ¨ Los bebés que solo cielo AT&T materna son menos propensos a desarrollar asma y alergias. Luigi son menos propensos a ser obesos. Luigi son menos propensos a desarrollar diabetes o enfermedades del corazón. · [employer] a lis bebés tienen menos sangrado después del Sherlyn. Lis úteros también recobran russell tamaño normal más rápido. · Algunas mujeres que amamantan bajan de [place]isco[place] rápido. Elaborar leche quema calorías. · El amamantamiento puede disminuir el riesgo de tener cáncer de seno, cáncer de ovarios y osteoporosis. Decida sobre la circuncisión para los niños  · Al patsy esta decisión, podría ser de ayuda pensar en lis tradiciones personales, religiosas y familiares.  Es russell decisión si desea conservar el pene de russell hijo natural o circuncidar a russell hijo.  · Si decide que le gustaría que circuncidaran a russell bebé, hable con russell médico. Discuta cualquier inquietud que tenga sobre el dolor. También puede hablar acerca de destin preferencias para la anestesia. ¿Dónde puede encontrar más información en inglés? Mateusz Little a http://radha-doug.info/. Dago Alvarez B378 en la búsqueda para aprender más acerca de \"Semanas 28 a 29 de russell embarazo: Instrucciones de cuidado - [ [place] 32 to 29 of Your Pregnancy: Care Instructions ]. \"  Revisado: 16 marzo, 2017  Versión del contenido: 11.4  © 4987-3230 Healthwise, [employer]. Las instrucciones de cuidado fueron adaptadas bajo licencia por [employer] (which disclaims liability or warranty for this information). Si usted tiene [place] afección médica o sobre estas instrucciones, siempre pregunte a russell profesional de cristi. Tinteo, [employer] niega toda garantía o responsabilidad por russell uso de esta información.

## 2018-05-07 NOTE — PROGRESS NOTES
Return OB Visit       Subjective:   Danni De Leon 23 y.o.   COREY: 2018, Date entered prior to episode creation  GA:  32w5d. States she does not have abdominal pain  , chest pain, contractions, fever, headache , nausea and vomiting, pelvic pressure, right upper quadrant pain  , shortness of breath, swelling, vaginal bleeding , vaginal leaking of fluid  and visual disturbances. Fetal movements are present. She is taking PNV and she is eating healthy. She reports that 3 days ago she started to have nausea which usually appears when she takes care of her dog. No emesis. Allergies- reviewed: Allergies   Allergen Reactions    Penicillins Hives         Medications- reviewed:   Current Outpatient Prescriptions   Medication Sig    pyridoxine, vitamin B6, (VITAMIN B-6) 50 mg tablet Take 1 Tab by mouth three (3) times daily as needed.  prenatal vit-calcium-iron-fa (PRENATAL PLUS WITH CALCIUM) 27 mg iron- 1 mg tab Take 1 Tab by mouth daily. No current facility-administered medications for this visit. Past Medical History- reviewed:  No past medical history on file. Past Surgical History- reviewed:   History reviewed. No pertinent surgical history. Social History- reviewed:  Social History     Social History    Marital status: SINGLE     Spouse name: N/A    Number of children: N/A    Years of education: N/A     Occupational History    Not on file.      Social History Main Topics    Smoking status: Never Smoker    Smokeless tobacco: Never Used    Alcohol use No    Drug use: No    Sexual activity: Yes     Other Topics Concern    Not on file     Social History Narrative         Immunizations- reviewed:   Immunization History   Administered Date(s) Administered    Influenza Vaccine (Quad) PF 01/15/2018    Tdap 2018           Objective:     Visit Vitals    /69    Pulse 80    Temp 98.2 °F (36.8 °C) (Oral)    Resp 18    Ht 4' 8\" (1.422 m)    Wt 120 lb 9.6 oz (54.7 kg)    LMP 2017    SpO2 98%    BMI 27.04 kg/m2       Physical Exam:  GENERAL APPEARANCE: alert, well appearing, in no apparent distress  LUNGS: clear to auscultation, no wheezes, rales or rhonchi, symmetric air entry  HEART: regular rate and rhythm, no murmurs  ABDOMEN: soft, nontender, nondistended, no abnormal masses, no epigastric pain, bowel sounds present, fundal height 31 cm, FHT present at 140-145 bpm  BACK: no CVA tenderness  UTERUS: gravid  EXTREMITIES: no redness or tenderness in the calves or thighs, no edema  NEUROLOGICAL: alert, oriented, normal speech, no focal findings or movement disorder noted    Labs  Recent Results (from the past 12 hour(s))   AMB POC URINALYSIS DIP STICK AUTO W/O MICRO    Collection Time: 18 11:24 AM   Result Value Ref Range    Color (UA POC) Yellow     Clarity (UA POC) Slightly Cloudy     Glucose (UA POC) Negative Negative    Bilirubin (UA POC) Negative Negative    Ketones (UA POC) Negative Negative    Specific gravity (UA POC) 1.020 1.001 - 1.035    Blood (UA POC) Negative Negative    pH (UA POC) 7.0 4.6 - 8.0    Protein (UA POC) Negative Negative    Urobilinogen (UA POC) 0.2 mg/dL 0.2 - 1    Nitrites (UA POC) Negative Negative    Leukocyte esterase (UA POC) 1+ Negative         Assessment   22 y.o.   at  32w5d   /69   FH 31 cm  FHTs 140-145 bpm  Urine LE 1+  PNL Taking      Plan   1. [facility] at [address] [facility] by LMP c/w 2nd trimester US. COREY: 2018  · Prenatal labs:Blood type O positive, antibody screen negative, Urine culture negative, CBC on 3/27/18 ( Hg was 13.0, Platelets 303), CMP normal.Rubella/varicella immune. 1hour GTT WNL. HIV/GC/chlamydia/hepBsAg negative.  AFP tetra screen negative  · Continue prenatal vitamins   · Influenza vaccine was given on 1/15/18   · S/p Tdap on 3/28/2018  · Follow up appointment for routine prenatal visit is scheduled with me on  2018 at 2.00 pm  · [facility] appointment with [facility] is scheduled on May 17 at 10.15am at [place] [address] office at [address]      2. Abnormal second trimester US. Normal male fetal anatomy scan except echogenic bowel. The additional testing was done( Toxo, CMV and CF) which came back negative. Recommended to repeat US at 34 weeks    3. Nausea. Script for Vit B6 was given      Labor precautions discussed, including: Regular painful contractions, lasting for greater than one hour, taking your breath away; any vaginal bleeding; any leakage of fluid; or absent or decreased fetal movement. Call M.D. on call if any of these symptoms or signs occur. I have discussed the diagnosis with the patient and the intended plan as seen in the above orders. The patient has received an after-visit summary and questions were answered concerning future plans. I have discussed medication side effects and warnings with the patient as well. Informed pt to return to the office or go to the ER if she experiences vaginal bleeding, vaginal discharge, leaking of fluid, pelvic cramping. Pt was discussed with Dr. Rashi Kaiser, Attending Physician. Jelly Qiugley MD  Family Medicine Resident, PGY-2. Encounter Diagnoses:    ICD-10-CM ICD-9-CM    1. 32 weeks gestation of pregnancy Z3A.32 V22.2 AMB POC URINALYSIS DIP STICK AUTO W/O MICRO      CANCELED: CBC W/O DIFF   2.  Nausea R11.0 787.02 pyridoxine, vitamin B6, (VITAMIN B-6) 50 mg tablet

## 2018-05-07 NOTE — PROGRESS NOTES
Identified Patient with two Patient identifiers (Name and ). Two Patient Identifiers confirmed. Reviewed record in preparation for visit and have obtained necessary documentation. Chief Complaint   Patient presents with    Routine Prenatal Visit     32 Weeks 5 Days      Nausea     c/o nausea, states nausea begin last week       Visit Vitals    /69    Pulse 80    Temp 98.2 °F (36.8 °C) (Oral)    Resp 18    Ht 4' 8\" (1.422 m)    Wt 120 lb 9.6 oz (54.7 kg)    SpO2 98%    BMI 27.04 kg/m2       1. Have you been to the ER, urgent care clinic since your last visit? Hospitalized since your last visit? No    2. Have you seen or consulted any other health care providers outside of the [address] [place] [address] since your last visit? Include any pap smears or colon screening.  No

## 2018-05-07 NOTE — MR AVS SNAPSHOT
[address] 
[phone] Patient: Shaun Mcdonald MRN: [id number] :1995 Visit Information Date & Time Provider Department Dept. Phone Encounter #  
 2018 11:05 AM Cade Friend MD 1515 [facility] [phone] [id number] 2018  2:00 PM  
OB VISIT with Cade Friend MD  
1515 [facility] Appt Note: 35 Weeks 2 Days [address]250 [address]  
[phone]  
  
   
 9250 [facility] 99 [id number] Upcoming Health Maintenance Date Due Hepatitis A Peds Age 1-18 (1 of 2 - Standard Series) 1998 HPV Age 9Y-34Y (1 of 3 - Female 3 Dose Series) 2008 DTaP/Tdap/Td series (2 - Td) 2018 Influenza Age 5 to Adult 2018 Allergies as of 2018  Review Complete On: 2018 By: Rl Larsen LPN Severity Noted Reaction Type Reactions Penicillins  01/15/2018    Hives Current Immunizations  Reviewed on 2018 Name Date Influenza Vaccine (Quad) PF 1/15/2018 Tdap 3/27/2018 Not reviewed this visit You Were Diagnosed With   
  
 Codes Comments 32 weeks gestation of pregnancy    -  Primary ICD-10-CM: Z3A.32 
ICD-9-CM: V22.2 Nausea     ICD-10-CM: R11.0 ICD-9-CM: 787.02 Vitals BP Pulse Temp Resp Height(growth percentile) Weight(growth percentile) 112/69 80 98.2 °F (36.8 °C) (Oral) 18 4' 8\" (1.422 m) 120 lb 9.6 oz (54.7 kg) LMP SpO2 BMI OB Status Smoking Status 2017 98% 27.04 kg/m2 Pregnant Never Smoker Vitals History BMI and BSA Data Body Mass Index Body Surface Area  
 27.04 kg/m 2 1.47 m 2 Preferred Pharmacy Pharmacy Name Phone [id number] [facility], [address] [phone] Your Updated Medication List  
  
   
 This list is accurate as of 5/7/18 11:30 AM.  Always use your most recent med list.  
  
  
  
  
 prenatal vit-calcium-iron-fa 27 mg iron- 1 mg Tab Commonly known as:  PRENATAL PLUS with CALCIUM Take 1 Tab by mouth daily. pyridoxine (vitamin B6) 50 mg tablet Commonly known as:  VITAMIN B-6 Take 1 Tab by mouth three (3) times daily as needed. Prescriptions Printed Refills  
 pyridoxine, vitamin B6, (VITAMIN B-6) 50 mg tablet 0 Sig: Take 1 Tab by mouth three (3) times daily as needed. Class: Print Route: Oral  
  
We Performed the Following AMB POC URINALYSIS DIP STICK AUTO W/O MICRO [42962 CPT(R)] Patient Instructions Semanas 32 a 34 de russell embarazo: Instrucciones de cuidado - [ Lanetta Cone 32 to 29 of Your Pregnancy: Care Instructions ] Instrucciones de cuidado Gigi las últimas semanas de russell [place], usted podría sentir más darlyn y [place]. Es importante que descanse cuando pueda. Russell bebé en crecimiento está ejerciendo más presión sobre russell vejiga. Por eso, janna vez necesite orinar con más frecuencia. Las hemorroides también son comunes. Estas son venas en la abhijit del recto que causan dolor y comezón. En la semana 36, a la mayoría de las McKesson un análisis para detectar el estreptococo del mercy B (GBS, por destin siglas en inglés). El GBS es donna bacteria común que puede vivir en la vagina y el recto. Podría enfermar a russell bebé después del nacimiento. Si el [employer] positivo, le darán antibióticos gigi el [place] de [place]. Estos prevendrán que el bebé se contagie con la bacteria. Podría convenirle hablar con russell médico sobre poner en un banco la franny del cordón umbilical de russell bebé. Esta es la franny que queda en el cordón después del nacimiento. Si desea guardar esta franny, debe hacer los trámites necesarios con anticipación. No puede decidirlo en el último minuto. Si todavía no le harris aplicado la vacuna Tdap (tétanos, difteria y tos [place]) gigi fausto [facility], hable con russell médico acerca de aplicársela. Champ Rhodes a proteger a russell recién nacido contra la infección por tos ferina. La atención de seguimiento es donna parte clave de russell tratamiento y seguridad. Asegúrese de hacer y acudir a todas las citas, y llame a russell médico si está teniendo problemas. También es donna buena idea saber los resultados de los exámenes y mantener donna lista de los medicamentos que cyril. Cómo puede cuidarse en el hogar? Joechester hemorroides · Aumente en russell dieta la cantidad de líquidos, frutas, verduras y [place]. [place] ayudará a LAWRENCE Heart, Stormy. · Evite estar sentada gigi demasiado tiempo. Recuéstese sobre el lado james varias veces al día. · Límpiese con papel higiénico suave y húmedo. O puede utilizar compresas de infusión de hamamelis [facility] (\"witch hazel\") o toallas de higiene personal. 
· Si tiene malestar, pruebe a usar compresas de hielo. O puede hacer mary de asiento tibios. Hágalos gigi 20 minutos por vez, según lo necesite. · Use donna crema con hidrocortisona para el dolor y la picazón. Raynell Loges son Anusol y Preparation H Hydrocortisone. · Pregúntele a russell médico si puede patsy un ablandador de heces de venta charles. Considere el amamantamiento · Los expertos recomiendan que las mujeres amamanten por 1 año o [place]. La leche materna es el mejor alimento para los bebés. · A los bebés les resulta más fácil digerir la [place] materna que la [place] de [place]. Y siempre está disponible, tiene la temperatura ideal y es gratuita. · En general, los bebés que se alimentan con [address] son más sanos que los bebés que se alimentan con leche de [place]. [place] son menos propensos a tener infecciones de oído, resfriados, diarrea y neumonía. ¨ Los bebés que solo cielo [place] materna son menos propensos a desarrollar asma y alergias. Luigi son menos propensos a ser obesos. Luigi son menos propensos a desarrollar diabetes o enfermedades del corazón. · [employer] a lis bebés tienen menos sangrado después del [place]. Lis úteros también recobran russell tamaño normal más rápido. · Algunas mujeres que amamantan bajan de Timothy rápido. Elaborar leche quema calorías. · El amamantamiento puede disminuir el riesgo de tener cáncer de seno, cáncer de ovarios y osteoporosis. Decida sobre la circuncisión para los niños · Al patsy esta decisión, podría ser de ayuda pensar en lis tradiciones personales, religiosas y familiares. Es russell decisión si desea conservar el pene de russell hijo natural o circuncidar a russell hijo. · Si decide que le gustaría que circuncidaran a russell bebé, hable con russell médico. Discuta cualquier inquietud que tenga sobre el dolor. También puede hablar acerca de lis preferencias para la anestesia. Dónde puede encontrar más información en inglés? Jamir Meza a http://radha-doug.info/. Mariposa Winn P428 en la búsqueda para aprender más acerca de \"Semanas 28 a 29 de russell embarazo: Instrucciones de cuidado - [ Gallito Cunningham 32 to 29 of Your Pregnancy: Care Instructions ]. \" 
Revisado: 16 marzo, 2017 Versión del contenido: 11.4 © 9975-5319 Healthwise, Incorporated. Las instrucciones de cuidado fueron adaptadas bajo licencia por Good Help Connections (which disclaims liability or warranty for this information). Si usted tiene [place] afección médica o sobre estas instrucciones, siempre pregunte a russell profesional de cristi. Healthwise, Incorporated niega toda garantía o responsabilidad por russell uso de esta información. Introducing [facility]! [facility] introduces Mavrx patient portal. Now you can access parts of your medical record, email your doctor's office, and request medication refills online.    
 
1. In your internet browser, go to [email or web address] [email or web address] 2. Click on the First Time User? Click Here link in the Sign In box. You will see the New Member Sign Up page. 3. Enter your PV Nano Cell Access Code exactly as it appears below. You will not need to use this code after youve completed the sign-up process. If you do not sign up before the expiration date, you must request a new code. · PV [employer] Cell Access Code: [email or web address] Expires: 7/23/2018  3:32 PM 
 
4. Enter the last four digits of your Social Security Number (xxxx) and Date of Birth (mm/dd/yyyy) as indicated and click Submit. You will be taken to the next sign-up page. 5. Create a PV Nano Cell ID. This will be your PV [employer] login ID and cannot be changed, so think of one that is secure and easy to remember. 6. Create a PV Nano Cell password. You can change your password at any time. 7. Enter your Password Reset Question and Answer. This can be used at a later time if you forget your password. 8. Enter your e-mail address. You will receive e-mail notification when new information is available in [address] 9. Click Sign Up. You can now view and download portions of your medical record. 10. Click the Download Summary menu link to download a portable copy of your medical information. If you have questions, please visit the Frequently Asked Questions section of the PV [employer] Cell website. Remember, PV Nano Cell is NOT to be used for urgent needs. For medical emergencies, dial 911. Now available from your iPhone and Android! Please provide this summary of care documentation to your next provider. Your primary care clinician is listed as Monica Tidwell. If you have any questions after today's visit, please call [phone].

## 2018-05-17 ENCOUNTER — HOSPITAL ENCOUNTER (OUTPATIENT)
Dept: PERINATAL CARE | Age: 23
Discharge: HOME | End: 2018-05-17
Attending: OBSTETRICS & GYNECOLOGY
Payer: COMMERCIAL

## 2018-05-17 PROCEDURE — 76816 OB US FOLLOW-UP PER FETUS: CPT | Performed by: OBSTETRICS & GYNECOLOGY

## 2018-05-17 PROCEDURE — 76818 FETAL BIOPHYS PROFILE W/NST: CPT | Performed by: OBSTETRICS & GYNECOLOGY

## 2018-05-17 PROCEDURE — 76820 UMBILICAL ARTERY ECHO: CPT | Performed by: OBSTETRICS & GYNECOLOGY

## 2018-05-18 NOTE — PROGRESS NOTES
I recommended weekly  testing until delivery. It is reasonable to deliver at 38 to 39 weeks. We will  repeat fetal growth assessment in 3 weeks and plan timing of delivery. Next appt 18  Pt with increased umbilical artery doppler. EFW 12th percentile (4 lbs) LAWANDA 11  BPP 10/10. ?constitutional delay as mother is 4'8\".

## 2018-05-24 ENCOUNTER — HOSPITAL ENCOUNTER (OUTPATIENT)
Dept: PERINATAL CARE | Age: 23
Discharge: HOME | End: 2018-05-24
Attending: OBSTETRICS & GYNECOLOGY
Payer: MEDICAID

## 2018-05-24 PROCEDURE — 76820 UMBILICAL ARTERY ECHO: CPT | Performed by: OBSTETRICS & GYNECOLOGY

## 2018-05-24 PROCEDURE — 76818 FETAL BIOPHYS PROFILE W/NST: CPT | Performed by: OBSTETRICS & GYNECOLOGY

## 2018-05-25 ENCOUNTER — ROUTINE PRENATAL (OUTPATIENT)
Dept: FAMILY MEDICINE CLINIC | Age: 23
End: 2018-05-25

## 2018-05-25 VITALS
DIASTOLIC BLOOD PRESSURE: 70 MMHG | HEIGHT: 56 IN | RESPIRATION RATE: 17 BRPM | BODY MASS INDEX: 26.77 KG/M2 | WEIGHT: 119 LBS | SYSTOLIC BLOOD PRESSURE: 120 MMHG | OXYGEN SATURATION: 99 % | HEART RATE: 68 BPM | TEMPERATURE: 98.6 F

## 2018-05-25 DIAGNOSIS — Z34.93 PRENATAL CARE IN THIRD TRIMESTER: Primary | ICD-10-CM

## 2018-05-25 LAB
BILIRUB UR QL STRIP: NEGATIVE
GLUCOSE UR-MCNC: NEGATIVE MG/DL
KETONES P FAST UR STRIP-MCNC: NEGATIVE MG/DL
PH UR STRIP: 7 [PH] (ref 4.6–8)
PROT UR QL STRIP: NEGATIVE
SP GR UR STRIP: 1.01 (ref 1–1.03)
UA UROBILINOGEN AMB POC: NORMAL (ref 0.2–1)
URINALYSIS CLARITY POC: CLEAR
URINALYSIS COLOR POC: YELLOW
URINE BLOOD POC: NEGATIVE
URINE LEUKOCYTES POC: NEGATIVE
URINE NITRITES POC: NEGATIVE

## 2018-05-25 NOTE — PROGRESS NOTES
Chief Complaint   Patient presents with    Routine Prenatal Visit     1. Have you been to the ER, urgent care clinic since your last visit? Hospitalized since your last visit? No    2. Have you seen or consulted any other health care providers outside of the [address] [place] [address] since your last visit? Include any pap smears or colon screening.  No      NO ABNORMAL BLEEDING OR DISCHARGE    CAN FEEL BABY MOVE

## 2018-05-25 NOTE — MR AVS SNAPSHOT
[address] 
[phone] Patient: Prudence Flow MRN: [id number] :1995 Visit Information Date & Time Provider Department Dept. Phone Encounter #  
 2018  2:00 PM Madyson Hirsch MD 1515 [facility] [phone] [id number]  
  
 2018  2:25 PM  
OB VISIT with Madyson Hirsch MD  
1515 [address]) Appt Note: routine prenatal  
 3300 [facility] [address] [place] [address]  
[phone]  
  
   
 [address] [place] [address] Evangelina [address] Upcoming Health Maintenance Date Due Hepatitis A Peds Age 1-18 (1 of 2 - Standard Series) 1998 HPV Age 9Y-34Y (1 of 3 - Female 3 Dose Series) 2008 DTaP/Tdap/Td series (2 - Td) 2018 Influenza Age 5 to Adult 2018 Allergies as of 2018  Review Complete On: 2018 By: Huma Brown LPN Severity Noted Reaction Type Reactions Penicillins  01/15/2018    Hives Current Immunizations  Reviewed on 2018 Name Date Influenza Vaccine (Quad) PF 1/15/2018 Tdap 3/27/2018 Not reviewed this visit You Were Diagnosed With   
  
 Codes Comments Prenatal care in third trimester    -  Primary ICD-10-CM: Z34.93 
ICD-9-CM: V22.1 Vitals BP Pulse Temp Resp Height(growth percentile) Weight(growth percentile) 120/70 68 98.6 °F (37 °C) (Oral) 17 4' 8\" (1.422 m) 119 lb (54 kg) LMP SpO2 BMI OB Status Smoking Status 2017 99% 26.68 kg/m2 Pregnant Never Smoker Vitals History BMI and BSA Data Body Mass Index Body Surface Area  
 26.68 kg/m 2 1.46 m 2 Preferred Pharmacy Pharmacy Name Phone [id number] [address] [phone] Your Updated Medication List  
  
   
 This list is accurate as of 5/25/18  2:32 PM.  Always use your most recent med list.  
  
  
  
  
 prenatal vit-calcium-iron-fa 27 mg iron- 1 mg Tab Commonly known as:  PRENATAL PLUS with CALCIUM Take 1 Tab by mouth daily. pyridoxine (vitamin B6) 50 mg tablet Commonly known as:  VITAMIN B-6 Take 1 Tab by mouth three (3) times daily as needed. We Performed the Following AMB POC URINALYSIS DIP STICK AUTO W/O MICRO [73181 CPT(R)] CBC W/O DIFF [45181 CPT(R)] GROUP B STREP, DENIZ + REFLEX [[id number] Custom] HIV 1/2 AG/AB, 4TH GENERATION,W RFLX CONFIRM [id number] CPT(R)] T PALLIDUM SCREEN W/REFLEX [[id number] Custom] To-Do List   
 05/25/2018 Lab:  CHLAMYDIA/GC PCR   
  
 05/31/2018 10:30 AM  
  Appointment with ULTRASOUND 3 [facility]M at [facility] ([phone]) Patient Instructions Semanas 34 a 36 de russell embarazo: Instrucciones de cuidado - [ Chantelle Fernando 34 to 39 of Your Pregnancy: Care Instructions ] Instrucciones de cuidado A estas [place], russell bebé y russell abdomen habrán crecido considerablemente. Sarah es [place] de felicia a doe. En un embarazo a término se puede felicia a [employer] Corporation 40 y 43. Los pulmones de russell bebé están sarah listos para respirar aire. Los huesos de la jens de russell bebé ahora son bastante firmes krystin para protegerla lorena se mantienen lo suficientemente blandos krystin para moverse al atravesar el canal de [place]. Es posible que sienta entusiasmo, perla, ansiedad o miedo. Quizá se pregunte cómo se dará cuenta de si está en trabajo de parto o qué esperar en james momento. Trate de ser flexible con destin expectativas respecto del nacimiento. Dado que cada nacimiento es diferente, no hay manera de saber exactamente cómo será russell parto. Esta hoja de cuidados la ayudará a saber qué esperar y cómo prepararse. Le podría facilitar el parto.  
Si todavía no le harris aplicado la vacuna Tdap (tétanos, difteria y tos ferina) gigi fausto embarazo, hable con russell médico acerca de aplicársela. [place] a proteger a russell recién nacido contra la infección por tos ferina. En la semana 36, a la mayoría de las mujeres se les hace donna prueba de estreptococos del mercy B (GBS, por destin siglas en inglés). Los estreptococos del mercy B son bacterias comunes que pueden vivir en la vagina y el recto. Pueden hacer que russell bebé se enferme después del parto. Si el resultado es positivo, usted recibirá antibióticos gigi el trabajo de [place]. Los medicamentos evitarán que russell bebé contraiga las bacterias. La atención de seguimiento es donna parte clave de russell tratamiento y seguridad. Asegúrese de hacer y acudir a todas las citas, y llame a russell médico si está teniendo problemas. También es donna buena idea saber los resultados de los exámenes y mantener donna lista de los medicamentos que cyril. Cómo puede cuidarse en el hogar? [place] alternativas para aliviar el dolor · El dolor se manifiesta de modo diferente en cada whitney. Hable con russell médico acerca de destin sentimientos sobre el dolor. · Puede elegir entre varias formas de aliviar el dolor. Estas incluyen medicamentos o técnicas de respiración, así krystin medidas para estar cómoda. Usted puede utilizar más de [place] opción. · Si elige un analgésico (medicamento para el dolor) gigi el trabajo de [place], hable con russell médico acerca de destin opciones. Algunos medicamentos reducen la ansiedad y [place]ien [facility] a aliviar parte del dolor. Otros adormecen la parte inferior del cuerpo para que no sienta dolor. · Asegúrese de decirle a russell médico acerca de russell elección de analgésico antes de empezar el trabajo de parto o muy temprano en el Floria Landen de [place]. Es posible que pueda cambiar de parecer a medida que avanza el Floria Landen de [place]. · Mariia vez se duerme a donna whitney con medicamentos administrados a través de donna máscara o por vía intravenosa (IV). Tam Shaver · La primera etapa del Addie Genin de parto se divide en cathy fases: latente, activa y de transición. ¨ La mayoría de las mujeres experimentan la fase latente del Addie Genin de parto en destin hogares. Usted puede [employer] Partners o descansar, comer refrigerios livianos, beber líquidos david y comenzar a contar las contracciones. ¨ Cuando advierta que se le vuelve difícil hablar gigi donna contracción, es posible que esté por pasar a la fase activa. Ggii la fase Lanelle Leventhal, debería ir al hospital si no está allí aún. ¨ Usted está en la fase activa cuando tiene contracciones cada 3 o 4 minutos y tuttle alrededor de 60 segundos. El pk uterino comienza a abrirse con Petrona Maurice. ¨ Si se le rompe la glo, las contracciones serán más intensas y más frecuentes. ¨ Gigi la fase de transición, el pk uterino se estira y las contracciones se producen con Petrona Maurice. ¨ Quizá tenga deseos de pujar, sin embargo es posible que el pk uterino aún no esté preparado. El médico le dirá cuándo pujar. · La segunda etapa comienza cuando el pk uterino se abre por completo y usted está lista para pujar. ¨ Las contracciones son muy intensas a fin de empujar al bebé por el canal de parto. ¨ Sentirá la necesidad de pujar. Podría sentir krystin si tuviera ganas de evacuar el intestino. ¨ Quizás la entrenen a [place] 41 contracciones. Estas contracciones serán muy intensas lorena no ocurrirán con tanta frecuencia. Puede descansar un poco entre contracciones. ¨ Es posible que esté sensible e irritable. Es posible que no se dé cuenta de lo que pasa a russell alrededor. ¨ Un último esfuerzo y habrá nacido russell bebé. · La tercera etapa ocurre cuando con unas cuantas contracciones más se expulsa la placenta. [place] puede durar 30 minutos o menos. · La cuarta etapa es la de recuperación. Es posible que se sienta abrumada con las emociones y exhausta lorena alerta. Vonda es un buen momento para comenzar el amamantamiento. Dónde puede encontrar más información en inglés? Augustina Lopez a http://radha-doug.info/. Regina Goodson U079 en la búsqueda para aprender más acerca de \"Semanas 34 a 39 de russell embarazo: Instrucciones de cuidado - [ Liam Lilli 34 to 39 of Your Pregnancy: Care Instructions ]. \" 
Revisado: 16 marzo, 2017 Versión del contenido: 11.4 © 5361-8069 Healthwise, Incorporated. Las instrucciones de cuidado fueron adaptadas bajo licencia por Good Help Connections (which disclaims liability or warranty for this information). Si usted tiene [place] afección médica o sobre estas instrucciones, siempre pregunte a russell profesional de cristi. Healthwise, Incorporated niega toda garantía o responsabilidad por russell uso de esta información Introducing [facility]! [employer] introduces Eka Systems patient portal. Now you can access parts of your medical record, email your doctor's office, and request medication refills online. 1. In your internet browser, go to [email or web address] [place] [email or web address] Clubt 2. Click on the First Time User? Click Here link in the Sign In box. You will see the New Member Sign Up page. 3. Enter your Eka Systems Access Code exactly as it appears below. You will not need to use this code after youve completed the sign-up process. If you do not sign up before the expiration date, you must request a new code. · Eka Systems Access Code: [email or web address] Expires: 7/23/2018  3:32 PM 
 
4. Enter the last four digits of your Social Security Number (xxxx) and Date of Birth (mm/dd/yyyy) as indicated and click Submit. You will be taken to the next sign-up page. 5. Create a Holographic Projection for Architecturet ID. This will be your Eka Systems login ID and cannot be changed, so think of one that is secure and easy to remember. 6. Create a Eka Systems password. You can change your password at any time. 7. Enter your Password Reset Question and Answer. This can be used at a later time if you forget your password. 8. Enter your e-mail address. You will receive e-mail notification when new information is available in [address] 9. Click Sign Up. You can now view and download portions of your medical record. 10. Click the Download Summary menu link to download a portable copy of your medical information. If you have questions, please visit the Frequently Asked Questions section of the vBrand website. Remember, vBrand is NOT to be used for urgent needs. For medical emergencies, dial 911. Now available from your iPhone and Android! Please provide this summary of care documentation to your next provider. Your primary care clinician is listed as Lisa Hayes. If you have any questions after today's visit, please call [phone].

## 2018-05-25 NOTE — PROGRESS NOTES
23year old G1 at 35 weeks    Had echogenic bowel -- has growth restriction    Recommend delivery at 37 weeks    Repeat US in one week    + fetal movement    I reviewed with the resident the medical history and the resident's findings on the physical examination. I discussed with the resident the patient's diagnosis and concur with the plan.

## 2018-05-25 NOTE — PROGRESS NOTES
Return OB Visit       Subjective:   Amanda Sinha 23 y.o.   COREY: 2018, Date entered prior to episode creation  GA:  35w2d. States she does not have abdominal pain  , chest pain, contractions, fever, headache , pelvic pressure, right upper quadrant pain  , shortness of breath, swelling, vaginal bleeding  and vaginal leaking of fluid . Fetal movement are present. She is taking PNV and she is eating healthy. Allergies- reviewed: Allergies   Allergen Reactions    Penicillins Hives         Medications- reviewed:   Current Outpatient Prescriptions   Medication Sig    prenatal vit-calcium-iron-fa (PRENATAL PLUS WITH CALCIUM) 27 mg iron- 1 mg tab Take 1 Tab by mouth daily.  pyridoxine, vitamin B6, (VITAMIN B-6) 50 mg tablet Take 1 Tab by mouth three (3) times daily as needed. No current facility-administered medications for this visit. Past Medical History- reviewed:  History reviewed. No pertinent past medical history. Past Surgical History- reviewed:   History reviewed. No pertinent surgical history. Social History- reviewed:  Social History     Social History    Marital status: SINGLE     Spouse name: N/A    Number of children: N/A    Years of education: N/A     Occupational History    Not on file.      Social History Main Topics    Smoking status: Never Smoker    Smokeless tobacco: Never Used    Alcohol use No    Drug use: No    Sexual activity: Yes     Other Topics Concern    Not on file     Social History Narrative         Immunizations- reviewed:   Immunization History   Administered Date(s) Administered    Influenza Vaccine (Quad) PF 01/15/2018    Tdap 2018         Objective:     Visit Vitals    /70    Pulse 68    Temp 98.6 °F (37 °C) (Oral)    Resp 17    Ht 4' 8\" (1.422 m)    Wt 119 lb (54 kg)    LMP 2017    SpO2 99%    BMI 26.68 kg/m2       Physical Exam:  GENERAL APPEARANCE: alert, well appearing, in no apparent distress  LUNGS: clear to auscultation, no wheezes, rales or rhonchi, symmetric air entry  HEART: regular rate and rhythm, no murmurs  ABDOMEN: soft, nontender, nondistended, no abnormal masses, no epigastric pain, bowel sounds present, fundal height 31 cm, FHT present at 130-140 bpm  BACK: no CVA tenderness  UTERUS: gravid  EXTREMITIES: no redness or tenderness in the calves or thighs, no edema  NEUROLOGICAL: alert, oriented, normal speech, no focal findings or movement disorder noted    Labs    Recent Results (from the past 12 hour(s))   AMB POC URINALYSIS DIP STICK AUTO W/O MICRO    Collection Time: 18  2:02 PM   Result Value Ref Range    Color (UA POC) Yellow     Clarity (UA POC) Clear     Glucose (UA POC) Negative Negative    Bilirubin (UA POC) Negative Negative    Ketones (UA POC) Negative Negative    Specific gravity (UA POC) 1.015 1.001 - 1.035    Blood (UA POC) Negative Negative    pH (UA POC) 7.0 4.6 - 8.0    Protein (UA POC) Negative Negative    Urobilinogen (UA POC) 0.2 mg/dL 0.2 - 1    Nitrites (UA POC) Negative Negative    Leukocyte esterase (UA POC) Negative Negative         Assessment   22 y.o.   at  35w2d   /70  FH 31 cm  FHTs 130-140 bpm  Urine Clear  PNL taking          Plan   1. SIUP at 35w2d by LMP c/w 2nd trimester US. COREY: 2018  · Prenatal labs:Blood type O positive, antibody screen negative, Urine culture negative, CBC on 3/27/18 ( Hg was 13.0, Platelets 052), CMP normal.Rubella/varicella immune. 1hour GTT WNL. HIV/GC/chlamydia/HepBsAg negative. AFP tetra screen negative  · Continue prenatal vitamins   · S/p Influenza vaccine on 1/15/18   · S/p Tdap on 3/2GBS8/2018  · Getting CBC, GBS, GC/Chlamydia, HIV, T pallidum today  · Follow up appointment for routine prenatal visit is scheduled with me on  2018        2. Abnormal second trimester US. Normal male fetal anatomy scan except echogenic bowel.  The additional testing was done( Toxo, CMV and CF) which came back negative. Subsequent US at 34 weeks revealed IUGR with EFW 12th percentile and increased umbilical artery doppler. Per [facility] recommended delivery at 37 weeks.  -Continue weekly US with [facility]   -Scheduled for contraction stress test on June 8th at 6 am  prior to induction of labor ( per Dr. Augustine Reynolds)         Labor precautions discussed, including: Regular painful contractions, lasting for greater than one hour, taking your breath away; any vaginal bleeding; any leakage of fluid; or absent or decreased fetal movement. Call M.D. on call if any of these symptoms or signs occur. I have discussed the diagnosis with the patient and the intended plan as seen in the above orders. The patient has received an after-visit summary and questions were answered concerning future plans. I have discussed medication side effects and warnings with the patient as well. Informed pt to return to the office or go to the ER if she experiences vaginal bleeding, vaginal discharge, leaking of fluid, pelvic cramping. Pt was discussed with Dr. Tramaine Beasley, Attending Physician. Daryl Escobar MD  Family Medicine Resident, PGY-2      Encounter Diagnoses:    ICD-10-CM ICD-9-CM    1.  Prenatal care in third trimester Z34.93 V22.1 AMB POC URINALYSIS DIP STICK AUTO W/O MICRO      GROUP B STREP, DENIZ + REFLEX      CHLAMYDIA/GC PCR      CBC W/O DIFF      HIV 1/2 AG/AB, 4TH GENERATION,W RFLX CONFIRM      T PALLIDUM SCREEN W/REFLEX      CHLAMYDIA/GC PCR

## 2018-05-25 NOTE — PATIENT INSTRUCTIONS
Semanas 34 a 36 de russell embarazo: Instrucciones de cuidado - [ Sayda Redder 34 to 39 of Your Pregnancy: Care Instructions ]  Instrucciones de cuidado    A estas [place], russell bebé y russell abdomen habrán crecido considerablemente. Sarah es [place] de felicia a doe. En un embarazo a término se puede felicia a [employer] 40 y 43. Los pulmones de russell bebé están sarah listos para respirar aire. Los huesos de la jens de russell bebé ahora son bastante firmes krystin para protegerla lorena se mantienen lo suficientemente blandos krystin para moverse al atravesar el canal de [place]. Es posible que sienta entusiasmo, perla, ansiedad o miedo. Quizá se pregunte cómo se dará cuenta de si está en trabajo de parto o qué esperar en james momento. Trate de ser flexible con destin expectativas respecto del nacimiento. Dado que cada nacimiento es diferente, no hay manera de saber exactamente cómo será russell parto. Esta hoja de cuidados la ayudará a saber qué esperar y cómo prepararse. Le podría facilitar el parto. Si todavía no le harris aplicado la vacuna Tdap (tétanos, difteria y tos Ezzie Raudel) gigi fausto [place], hable con russell médico acerca de aplicársela. Ijeoma Floras a proteger a russell recién nacido contra la infección por tos ferina. En la semana 36, a la mayoría de las mujeres se les hace donna prueba de estreptococos del mercy B (GBS, por destin siglas en inglés). Los estreptococos del mercy B son bacterias comunes que pueden vivir en la vagina y el recto. Pueden hacer que russell bebé se enferme después del parto. Si el resultado es positivo, usted recibirá antibióticos gigi el trabajo de [place]. Los medicamentos evitarán que russell bebé contraiga las bacterias. La atención de seguimiento es donna parte clave de russell tratamiento y seguridad. Asegúrese de hacer y acudir a todas las citas, y llame a russell médico si está teniendo problemas. También es donna buena idea saber los resultados de los exámenes y mantener donna lista de los medicamentos que cyril.   ¿Cómo puede cuidarse en el hogar? Aprenda sobre las alternativas para aliviar el dolor  · El dolor se manifiesta de modo diferente en cada whitney. Hable con russell médico acerca de destin sentimientos sobre el dolor. · Puede elegir entre varias formas de aliviar el dolor. Estas incluyen medicamentos o técnicas de respiración, así krystin medidas para estar cómoda. Usted puede utilizar más de [place] opción. · Si elige un analgésico (medicamento para el dolor) gigi el trabajo de [place], hable con russell médico acerca de destin opciones. Algunos medicamentos reducen la ansiedad y [place] [facility] a aliviar parte del dolor. Otros adormecen la parte inferior del cuerpo para que no sienta dolor. · Asegúrese de decirle a russell médico acerca de russell elección de analgésico antes de empezar el trabajo de parto o muy temprano en el [place] de Agency. Es posible que pueda cambiar de parecer a medida que avanza el [place] de Agency. · Mariia vez se duerme a donna whitney con medicamentos administrados a través de donna máscara o por vía intravenosa (IV). Trabajo de parto y Agency  · La primera etapa del [place] de parto se divide en cathy fases: Ashlee Grad y de transición. ¨ La mayoría de las mujeres experimentan la fase latente del [place] de parto en destin hogares. Usted puede [employer] o descansar, comer refrigerios livianos, beber líquidos david y comenzar a contar las contracciones. ¨ Cuando advierta que se le vuelve difícil hablar gigi donna contracción, es posible que esté por pasar a la fase activa. Gigi la fase Charito Katz, debería ir al hospital si no está allí aún. ¨ Usted está en la fase activa cuando tiene contracciones cada 3 o 4 minutos y tuttle alrededor de 60 segundos. El pk uterino comienza a abrirse con Karmen Machado. ¨ Si se le rompe la glo, las contracciones serán más intensas y más frecuentes. ¨ Gigi la fase de transición, el pk uterino se estira y las contracciones se producen con Karmen Machado.   ¨ Quizá tenga deseos de pujar, sin embargo es posible que el pk uterino aún no esté preparado. El médico le dirá cuándo pujar. · La segunda etapa comienza cuando el pk uterino se abre por completo y usted está lista para pujar. ¨ Las contracciones son muy intensas a fin de empujar al bebé por el canal de parto. ¨ Sentirá la necesidad de pujar. Podría sentir krystin si tuviera ganas de evacuar el intestino. ¨ Quizás la entrenen a [place] [address] contracciones. Estas contracciones serán muy intensas lorena no ocurrirán con tanta frecuencia. Puede descansar un poco entre contracciones. ¨ Es posible que esté sensible e irritable. Es posible que no se dé cuenta de lo que pasa a russell alrededor. ¨ Un último esfuerzo y habrá nacido russell bebé. · La tercera etapa ocurre cuando con unas cuantas contracciones más se expulsa la placenta. Norene puede durar 30 minutos o menos. · La cuarta etapa es la de recuperación. Es posible que se sienta abrumada con las emociones y exhausta lorena alerta. Vonda es un buen momento para comenzar el amamantamiento. ¿Dónde puede encontrar más información en inglés? Lena Ko a http://radha-doug.info/. Samanta Kam [address] en la búsqueda para aprender más acerca de \"Semanas 34 a 39 de russell embarazo: Instrucciones de cuidado - [ Benjaman [place] 34 to 39 of Your Pregnancy: Care Instructions ]. \"  Revisado: 16 marzo, 2017  Versión del contenido: 11.4  © 7836-2883 Healthwise, [employer]. Las instrucciones de cuidado fueron adaptadas bajo licencia por [employer] (which disclaims liability or warranty for this information). Si usted tiene [place] Man afección médica o sobre estas instrucciones, siempre pregunte a russell profesional de cristi.  [facility] niega toda garantía o responsabilidad por russell uso de esta información

## 2018-05-27 LAB
GP B STREP DNA SPEC QL NAA+PROBE: NEGATIVE
GRBS, EXTERNAL: NEGATIVE

## 2018-05-29 LAB
C TRACH RRNA SPEC QL NAA+PROBE: NEGATIVE
ERYTHROCYTE [DISTWIDTH] IN BLOOD BY AUTOMATED COUNT: 14.2 % (ref 12.3–15.4)
HCT VFR BLD AUTO: 42.6 % (ref 34–46.6)
HGB BLD-MCNC: 14.1 G/DL (ref 11.1–15.9)
HIV 1+2 AB+HIV1 P24 AG SERPL QL IA: NON REACTIVE
HIV, EXTERNAL: NON REACTIVE
MCH RBC QN AUTO: 31.1 PG (ref 26.6–33)
MCHC RBC AUTO-ENTMCNC: 33.1 G/DL (ref 31.5–35.7)
MCV RBC AUTO: 94 FL (ref 79–97)
N GONORRHOEA RRNA SPEC QL NAA+PROBE: NEGATIVE
PLATELET # BLD AUTO: 247 X10E3/UL (ref 150–379)
RBC # BLD AUTO: 4.53 X10E6/UL (ref 3.77–5.28)
T PALLIDUM AB SER QL IA: NEGATIVE
T. PALLIDUM, EXTERNAL: NORMAL
WBC # BLD AUTO: 9.1 X10E3/UL (ref 3.4–10.8)

## 2018-05-31 ENCOUNTER — HOSPITAL ENCOUNTER (OUTPATIENT)
Dept: PERINATAL CARE | Age: 23
Discharge: HOME | End: 2018-05-31
Attending: OBSTETRICS & GYNECOLOGY
Payer: COMMERCIAL

## 2018-05-31 ENCOUNTER — HOSPITAL ENCOUNTER (INPATIENT)
Age: 23
LOS: 3 days | Discharge: HOME | End: 2018-06-03
Attending: FAMILY MEDICINE | Admitting: FAMILY MEDICINE
Payer: COMMERCIAL

## 2018-05-31 PROBLEM — O36.5990 IUGR (INTRAUTERINE GROWTH RESTRICTION) AFFECTING CARE OF MOTHER: Status: ACTIVE | Noted: 2018-05-31

## 2018-05-31 LAB
ALBUMIN SERPL-MCNC: 2.8 G/DL (ref 3.5–5)
ALBUMIN/GLOB SERPL: 0.8 {RATIO} (ref 1.1–2.2)
ALP SERPL-CCNC: 195 U/L (ref 45–117)
ALT SERPL-CCNC: 35 U/L (ref 12–78)
ANION GAP SERPL CALC-SCNC: 11 MMOL/L (ref 5–15)
AST SERPL-CCNC: 34 U/L (ref 15–37)
BASOPHILS # BLD: 0 K/UL (ref 0–0.1)
BASOPHILS NFR BLD: 0 % (ref 0–1)
BILIRUB SERPL-MCNC: 0.4 MG/DL (ref 0.2–1)
BUN SERPL-MCNC: 15 MG/DL (ref 6–20)
BUN/CREAT SERPL: 20 (ref 12–20)
CALCIUM SERPL-MCNC: 8.7 MG/DL (ref 8.5–10.1)
CHLORIDE SERPL-SCNC: 105 MMOL/L (ref 97–108)
CO2 SERPL-SCNC: 21 MMOL/L (ref 21–32)
CREAT SERPL-MCNC: 0.74 MG/DL (ref 0.55–1.02)
DIFFERENTIAL METHOD BLD: NORMAL
EOSINOPHIL # BLD: 0.2 K/UL (ref 0–0.4)
EOSINOPHIL NFR BLD: 2 % (ref 0–7)
ERYTHROCYTE [DISTWIDTH] IN BLOOD BY AUTOMATED COUNT: 13.3 % (ref 11.5–14.5)
GLOBULIN SER CALC-MCNC: 3.7 G/DL (ref 2–4)
GLUCOSE SERPL-MCNC: 100 MG/DL (ref 65–100)
HCT VFR BLD AUTO: 40.1 % (ref 35–47)
HGB BLD-MCNC: 13.7 G/DL (ref 11.5–16)
IMM GRANULOCYTES # BLD: 0 K/UL (ref 0–0.04)
IMM GRANULOCYTES NFR BLD AUTO: 0 % (ref 0–0.5)
LYMPHOCYTES # BLD: 2.1 K/UL (ref 0.8–3.5)
LYMPHOCYTES NFR BLD: 23 % (ref 12–49)
MCH RBC QN AUTO: 31.3 PG (ref 26–34)
MCHC RBC AUTO-ENTMCNC: 34.2 G/DL (ref 30–36.5)
MCV RBC AUTO: 91.6 FL (ref 80–99)
MONOCYTES # BLD: 1 K/UL (ref 0–1)
MONOCYTES NFR BLD: 11 % (ref 5–13)
NEUTS SEG # BLD: 5.8 K/UL (ref 1.8–8)
NEUTS SEG NFR BLD: 64 % (ref 32–75)
NRBC # BLD: 0 K/UL (ref 0–0.01)
NRBC BLD-RTO: 0 PER 100 WBC
PLATELET # BLD AUTO: 214 K/UL (ref 150–400)
PMV BLD AUTO: 11.9 FL (ref 8.9–12.9)
POTASSIUM SERPL-SCNC: 4.1 MMOL/L (ref 3.5–5.1)
PROT SERPL-MCNC: 6.5 G/DL (ref 6.4–8.2)
RBC # BLD AUTO: 4.38 M/UL (ref 3.8–5.2)
SODIUM SERPL-SCNC: 137 MMOL/L (ref 136–145)
WBC # BLD AUTO: 9.2 K/UL (ref 3.6–11)

## 2018-05-31 PROCEDURE — 80053 COMPREHEN METABOLIC PANEL: CPT | Performed by: FAMILY MEDICINE

## 2018-05-31 PROCEDURE — 85025 COMPLETE CBC W/AUTO DIFF WBC: CPT | Performed by: FAMILY MEDICINE

## 2018-05-31 PROCEDURE — 59200 INSERT CERVICAL DILATOR: CPT | Performed by: OBSTETRICS & GYNECOLOGY

## 2018-05-31 PROCEDURE — 75410000002 HC LABOR FEE PER 1 HR: Performed by: OBSTETRICS & GYNECOLOGY

## 2018-05-31 PROCEDURE — 74011250636 HC RX REV CODE- 250/636: Performed by: STUDENT IN AN ORGANIZED HEALTH CARE EDUCATION/TRAINING PROGRAM

## 2018-05-31 PROCEDURE — 74011250637 HC RX REV CODE- 250/637: Performed by: OBSTETRICS & GYNECOLOGY

## 2018-05-31 PROCEDURE — 74011250636 HC RX REV CODE- 250/636: Performed by: OBSTETRICS & GYNECOLOGY

## 2018-05-31 PROCEDURE — 3E0P7VZ INTRODUCTION OF HORMONE INTO FEMALE REPRODUCTIVE, VIA NATURAL OR ARTIFICIAL OPENING: ICD-10-PCS | Performed by: OBSTETRICS & GYNECOLOGY

## 2018-05-31 PROCEDURE — 75410000003 HC RECOV DEL/VAG/CSECN EA 0.5 HR: Performed by: OBSTETRICS & GYNECOLOGY

## 2018-05-31 PROCEDURE — 3E0234Z INTRODUCTION OF SERUM, TOXOID AND VACCINE INTO MUSCLE, PERCUTANEOUS APPROACH: ICD-10-PCS | Performed by: STUDENT IN AN ORGANIZED HEALTH CARE EDUCATION/TRAINING PROGRAM

## 2018-05-31 PROCEDURE — 76820 UMBILICAL ARTERY ECHO: CPT | Performed by: OBSTETRICS & GYNECOLOGY

## 2018-05-31 PROCEDURE — 65270000029 HC RM PRIVATE

## 2018-05-31 PROCEDURE — 75410000000 HC DELIVERY VAGINAL/SINGLE: Performed by: OBSTETRICS & GYNECOLOGY

## 2018-05-31 PROCEDURE — 74011250637 HC RX REV CODE- 250/637: Performed by: FAMILY MEDICINE

## 2018-05-31 PROCEDURE — 76819 FETAL BIOPHYS PROFIL W/O NST: CPT | Performed by: OBSTETRICS & GYNECOLOGY

## 2018-05-31 PROCEDURE — 86901 BLOOD TYPING SEROLOGIC RH(D): CPT

## 2018-05-31 PROCEDURE — 74011250636 HC RX REV CODE- 250/636: Performed by: FAMILY MEDICINE

## 2018-05-31 PROCEDURE — 36415 COLL VENOUS BLD VENIPUNCTURE: CPT | Performed by: FAMILY MEDICINE

## 2018-05-31 RX ORDER — BUTORPHANOL TARTRATE 2 MG/ML
2 INJECTION INTRAMUSCULAR; INTRAVENOUS
Status: DISCONTINUED | OUTPATIENT
Start: 2018-06-01 | End: 2018-06-01

## 2018-05-31 RX ORDER — ZOLPIDEM TARTRATE 5 MG/1
5 TABLET ORAL
Status: DISCONTINUED | OUTPATIENT
Start: 2018-05-31 | End: 2018-06-01

## 2018-05-31 RX ORDER — OXYTOCIN IN 5 % DEXTROSE 30/500 ML
.5-2 PLASTIC BAG, INJECTION (ML) INTRAVENOUS
Status: DISCONTINUED | OUTPATIENT
Start: 2018-05-31 | End: 2018-06-01

## 2018-05-31 RX ORDER — SODIUM CHLORIDE 0.9 % (FLUSH) 0.9 %
5-10 SYRINGE (ML) INJECTION AS NEEDED
Status: DISCONTINUED | OUTPATIENT
Start: 2018-05-31 | End: 2018-06-03 | Stop reason: HOSPADM

## 2018-05-31 RX ORDER — SODIUM CHLORIDE 0.9 % (FLUSH) 0.9 %
5-10 SYRINGE (ML) INJECTION EVERY 8 HOURS
Status: DISCONTINUED | OUTPATIENT
Start: 2018-05-31 | End: 2018-06-03 | Stop reason: HOSPADM

## 2018-05-31 RX ORDER — SODIUM CHLORIDE, SODIUM LACTATE, POTASSIUM CHLORIDE, CALCIUM CHLORIDE 600; 310; 30; 20 MG/100ML; MG/100ML; MG/100ML; MG/100ML
125 INJECTION, SOLUTION INTRAVENOUS CONTINUOUS
Status: DISCONTINUED | OUTPATIENT
Start: 2018-05-31 | End: 2018-06-03 | Stop reason: HOSPADM

## 2018-05-31 RX ADMIN — DINOPROSTONE 10 MG: 10 INSERT VAGINAL at 17:42

## 2018-05-31 RX ADMIN — Medication 10 ML: at 23:06

## 2018-05-31 RX ADMIN — Medication 2 MILLI-UNITS/MIN: at 13:05

## 2018-05-31 RX ADMIN — ZOLPIDEM TARTRATE 5 MG: 5 TABLET ORAL at 23:02

## 2018-05-31 RX ADMIN — SODIUM CHLORIDE, SODIUM LACTATE, POTASSIUM CHLORIDE, AND CALCIUM CHLORIDE 125 ML/HR: 600; 310; 30; 20 INJECTION, SOLUTION INTRAVENOUS at 13:05

## 2018-05-31 RX ADMIN — BUTORPHANOL TARTRATE 2 MG: 2 INJECTION, SOLUTION INTRAMUSCULAR; INTRAVENOUS at 23:02

## 2018-05-31 NOTE — PROGRESS NOTES
[address] Medicine Residency Attending Addendum:    I was NOT present with the resident during the interview & examination of the patient. I personally spoke with the resident and reviewed the plan. I have also spoken with Dr. Mickey Hernández who will see the patient. Hx: Pt being scanned by [facility] and found to have oligo, elevated dopplers and IUGR, sent for delivery. Exam:   VS: Patient Vitals for the past 4 hrs:   Pulse BP SpO2   05/31/18 1242 - - 99 %   05/31/18 1237 - - 99 %   05/31/18 1232 - - 99 %   05/31/18 1227 - - 99 %   05/31/18 1222 - - 99 %   05/31/18 1217 - - 99 %   05/31/18 1212 - - 99 %   05/31/18 1207 - - 99 %   05/31/18 1202 - - 99 %   05/31/18 1157 - - 100 %   05/31/18 1152 - - 99 %   05/31/18 1147 - - 99 %   05/31/18 1142 - - 98 %   05/31/18 1141 70 147/88 -       GEN: NAD, lying in bed comfortably  SVE by RN: closed/thick/high   FHT: 140s, mod diamond, _accles, no decels  TOCO: irritability     Assessment/Plan:  21yo G1 @ 36.1 by LMP=2nd tri sono  1. IUGR: now with oligo and abnormal dopplers, [facility] recommends delivery. Last growth 12th% (AC <5th%), will plan for contraction stress test and if baby tolerates can place cervidil tonight. FHT reassuring.     2.  IUP: RH pos, GBS neg, s/p flu and tdap, cephalic by sono   3.  Echogenic bowel: w/u neg including normal cell free fetal dna, neg CMV, toxo and CF       Molina Juarez, DO 5/31/2018

## 2018-05-31 NOTE — IP AVS SNAPSHOT
Chriss Pickard 
 
 
 [address] [place] [address] [address] 
[phone] Patient: Carmelo Brown MRN: [id number] :1995 A check christian indicates which time of day the medication should be taken. My Medications START taking these medications Instructions Each Dose to Equal  
 Morning Noon Evening Bedtime  
 ibuprofen 800 mg tablet Commonly known as:  MOTRIN Your last dose was: Your next dose is: Take 1 Tab by mouth every eight (8) hours as needed for Pain. 800 mg NIFEdipine ER 30 mg ER tablet Commonly known as:  PROCARDIA XL Your last dose was: Your next dose is: Take 1 Tab by mouth daily. 30 mg CONTINUE taking these medications Instructions Each Dose to Equal  
 Morning Noon Evening Bedtime  
 prenatal vit-calcium-iron-fa 27 mg iron- 1 mg Tab Commonly known as:  PRENATAL PLUS with CALCIUM Your last dose was: Your next dose is: Take 1 Tab by mouth daily. 1 Tab STOP taking these medications   
 pyridoxine (vitamin B6) 50 mg tablet Commonly known as:  VITAMIN B-6 Where to Get Your Medications Information on where to get these meds will be given to you by the nurse or doctor. ! Ask your nurse or doctor about these medications  
  ibuprofen 800 mg tablet NIFEdipine ER 30 mg ER tablet

## 2018-05-31 NOTE — PROCEDURES
Patient seen and examined. Status post contraction stress test with pitocin- negative result. Cervix: closed/thick/-3. Fetal tracing Category 1, 140 bpm.      Patient placed in dorsal lithothomy position, cervidil placed into the posterior fornix of vagina. Patient tolerated procedure well. Induction process begins.

## 2018-05-31 NOTE — PROGRESS NOTES
Pt sent to labor and delivery for IUGR, oligo, and increased dopplers. BPP reassuring. Pt discussed with India Juarez and OB hospitalist. See admission notes for further details.

## 2018-05-31 NOTE — PROGRESS NOTES
I reviewed with the resident the medical history and the resident's findings on the physical examination. I discussed with the resident the patient's diagnosis and concur with the plan. 23yo G1 @ 32.5 by LMP=2nd tri sono  1. IUP: RH pos, GBS neg, s/p flu and tdap   2. Echogenic bowel: w/u neg including normal cell free fetal dna, neg CMV, toxo and CF  3. IUGR: last growth 12th% (AC <5th%), elevated S/D ratio on doppler, recommend IOL at 37 weeks with oxytocin challenge prior to cervidil to assess fetal tolerance.

## 2018-05-31 NOTE — PROGRESS NOTES
Antepartum Progress Note    Name: Kelly Germain MRN: [id number]  SSN: [id number]    YOB: 1995  Age: 23 y.o. Sex: female      Subjective:      LOS: 0 days    Estimated Date of Delivery: 18   Gestational Age Today: 43w3d     Admitted for IUGR, elevated dopplers and oligohydramnios with recommendation for delivery. Patient currently stable, states she is feeling some contractions. However, she denies  abdominal pain  , chest pain, headache , nausea and vomiting, right upper quadrant pain  , shortness of breath and visual disturbances. Cervidil was recently placed, and patient tolerated. No concern at the moment. Father of the baby is present at bedside. Objective:     Vitals:  Blood pressure 131/74, pulse 71, temperature 98.4 °F (36.9 °C), resp. rate 16, last menstrual period 2017, SpO2 99 %, currently breastfeeding. Temp (24hrs), Av.4 °F (36.9 °C), Min:98.4 °F (36.9 °C), Max:98.4 °F (62.5 °C)    Systolic (10PZX), YEY:203 , Min:131 , UKY:188      Diastolic (51TWA), RZZ:39, Min:74, Max:88       Intake and Output:          Physical Exam:  Patient without distress.   Heart: Regular rate and rhythm or S1S2 present  Lung: clear to auscultation throughout lung fields, no wheezes, no rales, no rhonchi and normal respiratory effort  Abdomen: gravid, nontender  Fundus: soft and non tender  Perineum: blood absent, amniotic fluid absent  Cervical Exam: Closed/Thick/High  Lower Extremities: No Edema   Membranes:  Intact  Fetal Heart Rate:  Reactive  Baseline: 140 per minute  Variability: moderate  Accelerations: yes  Decelerations: none  Uterine contractions: irregular, every 6-7 minutes       Labs:   Recent Results (from the past 24 hour(s))   CBC WITH AUTOMATED DIFF    Collection Time: 18 11:45 AM   Result Value Ref Range    WBC 9.2 3.6 - 11.0 K/uL    RBC 4.38 3.80 - 5.20 M/uL    HGB 13.7 11.5 - 16.0 g/dL    HCT 40.1 35.0 - 47.0 %    MCV 91.6 80.0 - 99.0 FL    MCH 31.3 26.0 - 34.0 PG    MCHC 34.2 30.0 - 36.5 g/dL    RDW 13.3 11.5 - 14.5 %    PLATELET 797 683 - 486 K/uL    MPV 11.9 8.9 - 12.9 FL    NRBC 0.0 0  WBC    ABSOLUTE NRBC 0.00 0.00 - 0.01 K/uL    NEUTROPHILS 64 32 - 75 %    LYMPHOCYTES 23 12 - 49 %    MONOCYTES 11 5 - 13 %    EOSINOPHILS 2 0 - 7 %    BASOPHILS 0 0 - 1 %    IMMATURE GRANULOCYTES 0 0.0 - 0.5 %    ABS. NEUTROPHILS 5.8 1.8 - 8.0 K/UL    ABS. LYMPHOCYTES 2.1 0.8 - 3.5 K/UL    ABS. MONOCYTES 1.0 0.0 - 1.0 K/UL    ABS. EOSINOPHILS 0.2 0.0 - 0.4 K/UL    ABS. BASOPHILS 0.0 0.0 - 0.1 K/UL    ABS. IMM. GRANS. 0.0 0.00 - 0.04 K/UL    DF AUTOMATED     TYPE & SCREEN    Collection Time: 18 11:45 AM   Result Value Ref Range    Crossmatch Expiration 2018     ABO/Rh(D) O POSITIVE     Antibody screen NEG    METABOLIC PANEL, COMPREHENSIVE    Collection Time: 18 12:12 PM   Result Value Ref Range    Sodium 137 136 - 145 mmol/L    Potassium 4.1 3.5 - 5.1 mmol/L    Chloride 105 97 - 108 mmol/L    CO2 21 21 - 32 mmol/L    Anion gap 11 5 - 15 mmol/L    Glucose 100 65 - 100 mg/dL    BUN 15 6 - 20 MG/DL    Creatinine 0.74 0.55 - 1.02 MG/DL    BUN/Creatinine ratio 20 12 - 20      GFR est AA >60 >60 ml/min/1.73m2    GFR est non-AA >60 >60 ml/min/1.73m2    Calcium 8.7 8.5 - 10.1 MG/DL    Bilirubin, total 0.4 0.2 - 1.0 MG/DL    ALT (SGPT) 35 12 - 78 U/L    AST (SGOT) 34 15 - 37 U/L    Alk. phosphatase 195 (H) 45 - 117 U/L    Protein, total 6.5 6.4 - 8.2 g/dL    Albumin 2.8 (L) 3.5 - 5.0 g/dL    Globulin 3.7 2.0 - 4.0 g/dL    A-G Ratio 0.8 (L) 1.1 - 2.2         Assessment and Plan:      Ana Rosa Acosta is a 23 y.o.   female with an estimated gestational age of 43w3d who is admitted to L&D for induction of labor.     1. IUGR, oligohydramnios and abnormal dopplers: As evidenced on 2nd/3rd trimester ultrasounds.  Doppler abnormal today.   -Patient tolerated contraction stress test with Pitocin on arrival.  -Cervidil placed ~5:30pm, with plan to remove in 12 hours. Will reassess cervical change in AM to guide for further management.  -On NST, cat 1 tracing. Will continue to monitor.     2. SIUP at 36w1d. Pregnancy complicated by IUGR and oligohydramnios  -Cephalic position confirmed on ultrasound at [facility] on 5/31  -PNL: O positive, antibody screen negative, rubella/VZV immune, HIV/Tpal/HepBsAg negative, G/C neg, GBS negative. AFP neg. 1hour GTT WNL. Hg 14.1 on 5/25/18. 3. Echogenic bowel: w/u neg including normal cell free fetal dna, neg CMV, toxo and CF         Patient discussed with Dr. Bridget Elkins.       Signed By: Tanisha Bower MD    Family Medicine Resident

## 2018-05-31 NOTE — PROGRESS NOTES
1900:  Bedside shift report received from [address] [place] [address], [facility] . RN assumes care of patient at this time. 2200:  Report given to PASCUAL Suero RNC.    0000:  RN resuming care of patient. 0045:  RN rounded on patient. She was resting quietly in bed looking at her ipad. She states she is still feeling contractions but thinks she will be able to fall asleep. RN refilled pitcher of water. She denies other needs at this time. 0515:  Cervidil removed at this time. Patient in shower with boyfriend's assistance. RN educated patient on shower safety. She verbalizes understanding. 1192:  Bedside and Verbal shift change report given to LUIS Ramírez (oncoming nurse) by Alf Guallpa RN (offgoing nurse). Report included the following information SBAR, Kardex, Intake/Output, MAR, Accordion and Recent Results.

## 2018-05-31 NOTE — PROGRESS NOTES
05/31/18 1:59 PM  CM met with patient to complete initial assessment and to begin discharge planning. Demographics were reviewed and confirmed; patient and her boyfriend/FOB Julienne ([phone]) live together and this is their first baby. Patient works at [employer] and will have 2 months off from work; FOB confirmed that he will have also time off from work. Patient and FOB noted that they live near family who will assist as needed. Patient plans to breastfeed and will need a rx for a pump. [facility] Osmar Brantley at [facility] will provide medical follow up for the baby. Patient has car seat, crib, clothing, and other necessary supplies. Patient has [facility] and Medicaid services and is aware of the process to add the baby to both. Plan now is for patient to remain hospitalized for delivery. Care Management Interventions  PCP Verified by CM:  Yes  Mode of Transport at Discharge: Self  Transition of Care Consult (CM Consult): Discharge Planning  Current Support Network: Family Lives [place], Other (Lives with boyfriend/FOB)  Confirm Follow Up Transport: Family  Plan discussed with Pt/Family/Caregiver: Yes  Discharge Location  Discharge Placement: Home with family assistance  Alvah Spurling, MSW

## 2018-05-31 NOTE — PROGRESS NOTES
Dr. Constance Beckett called and informed of pt arrival.  MD informed of initial high blood pressure and orders for CMP given to send to lab. MD stated to do a vaginal exam and page residents to get plan of care. 65 - Dr. Desir Began with 2 residents at bedside evaluating pt status. SVE per MD is a dimple. MD stated to do a contraction stress test with pitocin at this time. Resident stated to titrate pitocin until obtain 2 contractions in 10 minutes and continuously monitor baby. 1355 - pitocin medication stopped. Negative contraction stress test.  Plan is to place cervidil for cervical ripening at 5pm.2551 - Dr Edgar Blanco at bedside explaining plan of care and risks and benefits of induction. Pt verbalized understanding. Fetal tracing shown to Dr. Lon Cardenas prior to cervidil placement. MD placed cervidil. Pt tolerated well. MD states ok to eat dinner at this time. Dr. Jonathon Arreola - bedside report given in SBAR format to Jonn Stroud, RN for continued care of pt.

## 2018-05-31 NOTE — H&P
History & Physical    Name: Tamara Crespo MRN: [id number]  SSN: [id number]    YOB: 1995  Age: 23 y.o. Sex: female      Subjective:     Reason for Admission:  Pregnancy, IUGR and Oligohydramnios    History of Present Illness: Ms. Harjinder Oneill is a 23 y.o.   female with an estimated gestational age of 43w3d with Estimated Date of Delivery: 18. Patient had appointment with [facility] this morning for follow-up on IUGR. Umbilical artery doppler showed increase resistance, in addition to oligohydramnios, so she was sent to L&D with recommendation for delivery now. Pregnancy has been complicated by fetal growth restriction and oligohydramnios. Patient denies abdominal pain  , chest pain, contractions, fever, headache , nausea and vomiting, pelvic pressure, right upper quadrant pain  , shortness of breath, swelling, vaginal bleeding , vaginal leaking of fluid , visual disturbances and burning with urination and dysuria. OB History    Para Term  AB Living   1        SAB TAB Ectopic Molar Multiple Live Births              # Outcome Date GA Lbr Reinaldo/2nd Weight Sex Delivery Anes PTL Lv   1 Current                 History reviewed. No pertinent past medical history. History reviewed. No pertinent surgical history. Social History     Occupational History    Not on file. Social History Main Topics    Smoking status: Never Smoker    Smokeless tobacco: Never Used    Alcohol use No    Drug use: No    Sexual activity: Yes      History reviewed. No pertinent family history. Allergies   Allergen Reactions    Penicillins Hives     Prior to Admission medications    Medication Sig Start Date End Date Taking? Authorizing Provider   prenatal vit-calcium-iron-fa (PRENATAL PLUS WITH CALCIUM) 27 mg iron- 1 mg tab Take 1 Tab by mouth daily. 1/15/18  Yes Giacomo Sanford,    pyridoxine, vitamin B6, (VITAMIN B-6) 50 mg tablet Take 1 Tab by mouth three (3) times daily as needed. 18   Jacobo De Los Santos MD        Review of Systems:  A comprehensive review of systems was negative except for that written in the History of Present Illness. Objective:     Vitals: There were no vitals filed for this visit. No data recorded. No Data Recorded     Physical Exam:  Patient without distress. Heart: Regular rate and rhythm or S1S2 present  Lung: clear to auscultation throughout lung fields, no wheezes, no rales, no rhonchi and normal respiratory effort  Abdomen: gravid, nontender  Fundus: soft and non tender  Perineum: blood absent, amniotic fluid absent  Cervical Exam: Closed/Thick/High  Lower Extremities: No Edema   Membranes:  Intact  Fetal Heart Rate:  Reactive  Baseline: 140 per minute  Variability: moderate  Accelerations: yes  Decelerations: none  Uterine contractions: irregular, every 4-5 minutes       Lab/Data Review:  No results found for this or any previous visit (from the past 24 hour(s)). Assessment and Plan:     Ms. Jasmin Ruelas is a 23 y.o.   female with an estimated gestational age of 43w3d who is admitted to L&D for induction of labor. 1. IUGR, oligohydramnios and abnormal dopplers: As evidenced on 2nd/3rd trimester ultrasounds. Doppler abnormal today.   -Will start contraction stress test with Pitocin  -In the event baby tolerates Pitocin stress test, will place Cervidil ~4-5pm. However, if stress test is abnormal, will likely anticipate   -Keep NPO  -Will continue to follow    2. SIUP at 36w1d. Pregnancy complicated by IUGR and oligohydramnios  -Cephalic position confirmed on ultrasound at MFM on   -PNL: O positive, antibody screen negative, rubella/VZV immune, HIV/Tpal/HepBsAg negative, G/C neg, GBS negative. AFP neg. 1hour GTT WNL. Hg 14.1 on 18.        Patient will be discussed with Dr. Clarene Gaucher, MD  [facility] Medicine Resident

## 2018-06-01 LAB
ABO + RH BLD: NORMAL
BLOOD GROUP ANTIBODIES SERPL: NORMAL
ERYTHROCYTE [DISTWIDTH] IN BLOOD BY AUTOMATED COUNT: 13.4 % (ref 11.5–14.5)
HCT VFR BLD AUTO: 37.7 % (ref 35–47)
HGB BLD-MCNC: 12.7 G/DL (ref 11.5–16)
MCH RBC QN AUTO: 31.5 PG (ref 26–34)
MCHC RBC AUTO-ENTMCNC: 33.7 G/DL (ref 30–36.5)
MCV RBC AUTO: 93.5 FL (ref 80–99)
NRBC # BLD: 0 K/UL (ref 0–0.01)
NRBC BLD-RTO: 0 PER 100 WBC
PLATELET # BLD AUTO: 196 K/UL (ref 150–400)
PMV BLD AUTO: 11.8 FL (ref 8.9–12.9)
RBC # BLD AUTO: 4.03 M/UL (ref 3.8–5.2)
SPECIMEN EXP DATE BLD: NORMAL
WBC # BLD AUTO: 17.5 K/UL (ref 3.6–11)

## 2018-06-01 PROCEDURE — 0HQ9XZZ REPAIR PERINEUM SKIN, EXTERNAL APPROACH: ICD-10-PCS | Performed by: STUDENT IN AN ORGANIZED HEALTH CARE EDUCATION/TRAINING PROGRAM

## 2018-06-01 PROCEDURE — 65270000029 HC RM PRIVATE

## 2018-06-01 PROCEDURE — 85027 COMPLETE CBC AUTOMATED: CPT | Performed by: STUDENT IN AN ORGANIZED HEALTH CARE EDUCATION/TRAINING PROGRAM

## 2018-06-01 PROCEDURE — 10907ZC DRAINAGE OF AMNIOTIC FLUID, THERAPEUTIC FROM PRODUCTS OF CONCEPTION, VIA NATURAL OR ARTIFICIAL OPENING: ICD-10-PCS | Performed by: STUDENT IN AN ORGANIZED HEALTH CARE EDUCATION/TRAINING PROGRAM

## 2018-06-01 PROCEDURE — 74011000250 HC RX REV CODE- 250

## 2018-06-01 PROCEDURE — 77010026064 HC OXYGEN INFANT MED AIR MIN: Performed by: OBSTETRICS & GYNECOLOGY

## 2018-06-01 PROCEDURE — 74011250637 HC RX REV CODE- 250/637: Performed by: STUDENT IN AN ORGANIZED HEALTH CARE EDUCATION/TRAINING PROGRAM

## 2018-06-01 PROCEDURE — 75410000002 HC LABOR FEE PER 1 HR: Performed by: OBSTETRICS & GYNECOLOGY

## 2018-06-01 PROCEDURE — 74011250636 HC RX REV CODE- 250/636: Performed by: OBSTETRICS & GYNECOLOGY

## 2018-06-01 PROCEDURE — 88307 TISSUE EXAM BY PATHOLOGIST: CPT | Performed by: OBSTETRICS & GYNECOLOGY

## 2018-06-01 PROCEDURE — 74011000258 HC RX REV CODE- 258: Performed by: OBSTETRICS & GYNECOLOGY

## 2018-06-01 PROCEDURE — 36415 COLL VENOUS BLD VENIPUNCTURE: CPT | Performed by: STUDENT IN AN ORGANIZED HEALTH CARE EDUCATION/TRAINING PROGRAM

## 2018-06-01 RX ORDER — DIPHENHYDRAMINE HCL 25 MG
25 CAPSULE ORAL
Status: DISCONTINUED | OUTPATIENT
Start: 2018-06-01 | End: 2018-06-03 | Stop reason: HOSPADM

## 2018-06-01 RX ORDER — BUTORPHANOL TARTRATE 2 MG/ML
2 INJECTION INTRAMUSCULAR; INTRAVENOUS
Status: DISCONTINUED | OUTPATIENT
Start: 2018-06-01 | End: 2018-06-01

## 2018-06-01 RX ORDER — OXYTOCIN/RINGER'S LACTATE 20/1000 ML
125-500 PLASTIC BAG, INJECTION (ML) INTRAVENOUS ONCE
Status: ACTIVE | OUTPATIENT
Start: 2018-06-01 | End: 2018-06-01

## 2018-06-01 RX ORDER — LIDOCAINE HYDROCHLORIDE 10 MG/ML
10 INJECTION, SOLUTION EPIDURAL; INFILTRATION; INTRACAUDAL; PERINEURAL ONCE
Status: COMPLETED | OUTPATIENT
Start: 2018-06-01 | End: 2018-06-01

## 2018-06-01 RX ORDER — ZOLPIDEM TARTRATE 5 MG/1
5 TABLET ORAL
Status: DISCONTINUED | OUTPATIENT
Start: 2018-06-01 | End: 2018-06-03 | Stop reason: HOSPADM

## 2018-06-01 RX ORDER — OXYCODONE AND ACETAMINOPHEN 5; 325 MG/1; MG/1
1 TABLET ORAL
Status: DISCONTINUED | OUTPATIENT
Start: 2018-06-01 | End: 2018-06-03 | Stop reason: HOSPADM

## 2018-06-01 RX ORDER — OXYTOCIN IN 5 % DEXTROSE 30/500 ML
999 PLASTIC BAG, INJECTION (ML) INTRAVENOUS
Status: DISCONTINUED | OUTPATIENT
Start: 2018-06-01 | End: 2018-06-03 | Stop reason: HOSPADM

## 2018-06-01 RX ORDER — NALOXONE HYDROCHLORIDE 0.4 MG/ML
0.4 INJECTION, SOLUTION INTRAMUSCULAR; INTRAVENOUS; SUBCUTANEOUS AS NEEDED
Status: DISCONTINUED | OUTPATIENT
Start: 2018-06-01 | End: 2018-06-03 | Stop reason: HOSPADM

## 2018-06-01 RX ORDER — IBUPROFEN 800 MG/1
800 TABLET ORAL EVERY 8 HOURS
Status: DISCONTINUED | OUTPATIENT
Start: 2018-06-01 | End: 2018-06-03 | Stop reason: HOSPADM

## 2018-06-01 RX ORDER — BUTORPHANOL TARTRATE 2 MG/ML
2 INJECTION INTRAMUSCULAR; INTRAVENOUS
Status: DISCONTINUED | OUTPATIENT
Start: 2018-06-01 | End: 2018-06-03 | Stop reason: HOSPADM

## 2018-06-01 RX ORDER — SIMETHICONE 80 MG
80 TABLET,CHEWABLE ORAL
Status: DISCONTINUED | OUTPATIENT
Start: 2018-06-01 | End: 2018-06-03 | Stop reason: HOSPADM

## 2018-06-01 RX ORDER — LIDOCAINE HYDROCHLORIDE 10 MG/ML
INJECTION, SOLUTION EPIDURAL; INFILTRATION; INTRACAUDAL; PERINEURAL
Status: COMPLETED
Start: 2018-06-01 | End: 2018-06-01

## 2018-06-01 RX ORDER — ONDANSETRON 4 MG/1
4 TABLET, ORALLY DISINTEGRATING ORAL
Status: DISCONTINUED | OUTPATIENT
Start: 2018-06-01 | End: 2018-06-03 | Stop reason: HOSPADM

## 2018-06-01 RX ORDER — HYDROCORTISONE ACETATE PRAMOXINE HCL 2.5; 1 G/100G; G/100G
CREAM TOPICAL AS NEEDED
Status: DISCONTINUED | OUTPATIENT
Start: 2018-06-01 | End: 2018-06-03 | Stop reason: HOSPADM

## 2018-06-01 RX ORDER — SWAB
1 SWAB, NON-MEDICATED MISCELLANEOUS DAILY
Status: DISCONTINUED | OUTPATIENT
Start: 2018-06-01 | End: 2018-06-03 | Stop reason: HOSPADM

## 2018-06-01 RX ADMIN — Medication 1 TABLET: at 12:38

## 2018-06-01 RX ADMIN — BUTORPHANOL TARTRATE 2 MG: 2 INJECTION, SOLUTION INTRAMUSCULAR; INTRAVENOUS at 02:43

## 2018-06-01 RX ADMIN — IBUPROFEN 800 MG: 400 TABLET ORAL at 16:50

## 2018-06-01 RX ADMIN — LIDOCAINE HYDROCHLORIDE 10 ML: 10 INJECTION, SOLUTION EPIDURAL; INFILTRATION; INTRACAUDAL; PERINEURAL at 07:43

## 2018-06-01 RX ADMIN — IBUPROFEN 800 MG: 400 TABLET ORAL at 09:09

## 2018-06-01 RX ADMIN — PROMETHAZINE HYDROCHLORIDE 12.5 MG: 25 INJECTION INTRAMUSCULAR; INTRAVENOUS at 02:43

## 2018-06-01 RX ADMIN — MUPIROCIN: 20 OINTMENT TOPICAL at 03:32

## 2018-06-01 RX ADMIN — Medication 999 ML/HR: at 07:35

## 2018-06-01 NOTE — ROUTINE PROCESS
0700: Bedside, Verbal and Written shift change report given to LUIS Reid (oncoming nurse) by Emerita Guallpa RN (offgoing nurse). Report included the following information SBAR, Kardex, Intake/Output, MAR, Accordion and Recent Results. Dr. Saul Wilder asking for these RNs to prepare supplies to place yee bulb, Whie stating she will prepare yee bulb supplies and pt while this RN gets report on other pt.    0715: Dr. Jesus Sampson and [facility] residents Mesha and - at pt bedside at this time discussing plan of care with pt while this RN placed pt on FHR and toco monitor. Pt verbalizing understanding to placing of yee bulb.     0718: SVE per Dr. Mila Craft, /-1 noted. 0719:MD asking for amniohook to AROM pt, no yee bulb necessary. 0720: AROM per Dr. Jesus Sampson, moderate amount amount of clear fluid noted. 1501: Dr. Mila Craft and residents leaving pt bedside at this time     [phone]: This RN attempting FHR monitor at this time, pt turning back and forth in bed due to contraction pain making FHR tracing difficult. This RN placing pt on pulse ox monitor at this time as well    0724: FHR tones audible. This RN starting fluid bolus, turning pt on left side, and calling Shoaib Rojas RN charge for assistance at this time    0725: SVE per this RN, 10/100/+3 noted. This RN updating RAMOS Zimmer MD on SVE and FHR tones at this time, MD getting Dr. Mila Craft for delivery    5348: This RN asking Shoaib Rojas RN to call nursery for delivery     5216: Dr. Jesus Sampson, RAYMUNDO Hay, RN at pt bedside for delivery at this time. This RN continuing to trace fetal heart tones, FHR noted . MDs made aware    5738: Pt in lithotomy with legs placed in stirrups, pt educated how to push, pt now bearing down with each contraction. Dr. Jesus Sampson and Dr. Aston Osorio at pt perineum     1327: FHR tones audible 110, MDs aware    1172: Infant head out, MD assessing for nuchals, none noted.   viable male infant, infant placed on maternal abdomen and dried and stimulated. 5867: RAYMUNDO Brown, RN taking infant to warmer at this time    [id number]: Pt transferred from room 201 to room 216 at this time via wheelchair by this RN and pt family    80: Pt visiting baby in NICU at this time. 1630: This RN updating Dr. Jabari Worthy of pt bp readings. MD stating she will go to assess pt and discuss with attending    [phone]: Dr. Sharon Cruz draw CBC this evening if possible. Md made Dr. Inocencio Brannon aware of bp readings and no further orders at this time    1920: Bedside, Verbal and Written shift change report given to Naina Crane RN (oncoming nurse) by Lorena Roy RN (offgoing nurse). Report included the following information SBAR, Kardex, Intake/Output, MAR, Accordion and Recent Results.

## 2018-06-01 NOTE — PROGRESS NOTES
06/01/18 3:01 PM  CM met with MOB and FOB today for follow up. Sidney Molina delivered this morning and required CPAP support, which has now been discontinued. MOB and FOB had no questions or concerns at this time. CM completed initial assessment with them on 5/31 and they have all needs met. CM did request rx for breast pump from Dr. Selena Chopra today for MOB to obtain breast pump. MOB discussed that she was able to hold baby Idelle Slider today, which she was very happy about. CM will continue to follow.   VEE Renteria

## 2018-06-01 NOTE — L&D DELIVERY NOTE
Delivery Summary    Patient: Vel Nolen MRN: [id number]  SSN: [id number]    YOB: 1995  Age: 23 y.o. Sex: female        Labor Events:    Labor:   No   Rupture Date:   18   Rupture Time:   07:20   Rupture Type   AROM   Amniotic Fluid Volume:   Clear   Amniotic Fluid Description:         Induction: Prostaglandins        Augmentation: AROM    Labor Complications: None     Additional Complications:        Cervical Ripening:       Cervidil      Delivery Events:  Episiotomy: None    Laceration(s): First degree perineal;Left periurethral      Repaired: Yes     Number of Repair Packets: 2    Suture Type and Size:         Estimated Blood Loss (ml): 200        Information for the patient's :  Tomeka Ackerman, Male [[id number]]     Delivery Summary - Baby    Delivery Date: 2018   Delivery Time: 7:33 AM   Delivery Type: Vaginal, Spontaneous Delivery  Sex:  male  Gestational Age: 37w1d  Delivery Clinician:  Amaya Velazquez  Living?: Living   Delivery Location: L&D             APGARS  One minute Five minutes Ten minutes   Skin Color: 1           Heart Rate: 2            Reflex Irritability: 0            Muscle Tone: 0          Respiration: 1            Total: 4              Presentation: Vertex  Position:   Occiput    Resuscitation Method:  PPV; Tactile Stimulation;Suctioning-bulb     Meconium Stained: None    Cord Information: 3 Vessels   Complications: None  Cord Blood Sent?:  Yes    Blood Gases Sent?:  No    Placenta:  Date/Time:   7:34 AM  Removal: Spontaneous      Appearance: Normal      Measurements:  Birth Weight: 1.85 kg    Birth Length: 44 cm   Head Circumference: 30 cm     Chest Circumference: 26 cm    Abdominal Girth: 25.5 cm    Other Providers:   DUDLEY Kebede;KAYLYNN HARVEY;ADRIAN KELLY;ANA CAT;MEME JACKSON Obstetrician;Primary Nurse;Primary  Nurse;Nicu Nurse;Neonatologist;Charge Nurse;Resident Cord Blood Results:  Information for the patient's :  Bailee Marsh, Male [[id number]]   No results found for: Elsa Gaytan, PCTDIG, BILI, ABORHEXT, [address]    Information for the patient's :  Bailee Marsh Male [[id number]]     Lab Results   Component Value Date/Time    O2ST 97 2018 08:20 AM    SITE RIGHT RADIAL 2018 08:20 AM      Information for the patient's :  Bailee Marsh, Male [[id number]]   No results found for: EPHV, PCO2V, PO2V, HCO3V, F6XLQ, EBDV    Uncomplicated induction at 36+2 for oligohydramnios and IUGR that resulted in  of a  live male infant, OA presentation. Placenta delivered spontaneously and intact. There was a 1st laceration and periurethral laceration. EBL 200ml.

## 2018-06-01 NOTE — PROGRESS NOTES
Labor Progress Note  Patient seen, fetal heart rate and contraction pattern evaluated, patient examined.       Physical Exam:  Cervical Exam: 7 cm dilated, 90%effaced    Membranes:  ROM  Uterine Activity: Frequency: Every 2-4 minutes  Fetal Heart Rate: Reactive  Station -1    Assessment/Plan:  Reassuring fetal status, continue monitoring,   Discussed with pt and family, agree

## 2018-06-01 NOTE — LACTATION NOTE
Mom pumping only one breat. Discussed how pumping both breast can increase supply. Guidelines for pumping, milk collection and storage, proper cleaning of pump parts all reviewed. pumping with double electric set up. Assisted with pump session. Mom has [address]. Discussed mom should call [address] on Monday to see if she can get on the list for a double electric pump.

## 2018-06-01 NOTE — PROGRESS NOTES
Post-Partum Day Number 0 Progress Note    Patient doing well post-partum after a precipatous delivery this morning from cervidil placement yesterday. She is without significant complaint and tolerated induction well. Pain well controlled. Lochia normal.  Tolerating diet. Ambulating. Voiding without difficulty. Neg flatus. No BM yet. Vitals:  Patient Vitals for the past 8 hrs:   BP Temp Pulse Resp SpO2   18 0837 - - - - 99 %   18 0832 - - - - 100 %   18 0830 149/90 - (!) 59 - -   18 0827 - - - - 100 %   18 0822 - - - - 100 %   18 0817 - - - - 100 %   18 0815 (!) 164/91 - 64 - -   18 0812 - - - - 100 %   18 0807 - - - - 100 %   18 0803 156/89 97.8 °F (36.6 °C) 66 16 -   18 0802 - - - - 100 %   18 0757 - - - - 100 %   18 0752 - - - - 100 %   18 0747 - - - - 100 %   18 0742 - - - - 99 %   18 0737 - - - - 99 %   18 0732 - - - - 99 %   18 0250 - - - - 97 %   18 0247 (!) 149/96 98.2 °F (36.8 °C) 61 16 -   18 0245 - - - - 99 %   18 0242 (!) 149/102 - 66 - -   18 0240 - - - - 99 %     Temp (24hrs), Av.1 °F (36.7 °C), Min:97.8 °F (36.6 °C), Max:98.4 °F (36.9 °C)      Exam:  Patient without distress. CTAB, no w/r/r/c.               RRR, +S1 and S2, no m/r/g. Abdomen soft, fundus firm below level of umbilicus, nontender. Perineum with normal lochia noted. Hemostasis achieved with repair of 1st degree and periurethral laceration. Lower extremities:  No edema. No palpable cords or tenderness. Assessment and Plan:    John Freeman is a 23 y.o.  s/p  at 36 weeks 2 days. Patient appears to be having uncomplicated post-partum course. 1. Continue routine perineal care and maternal education. 2. Cord blood sent as mother is O positive    3.  Lactation consulted for mother's desire to breast and bottle feed  4. Plan discharge tomorrow if no problems occur. Patient seen, discussed, and delivered with Dr. Lu Adrian.                  Brissa Nurse, MD  Family Medicine Resident

## 2018-06-02 PROCEDURE — 74011250637 HC RX REV CODE- 250/637: Performed by: STUDENT IN AN ORGANIZED HEALTH CARE EDUCATION/TRAINING PROGRAM

## 2018-06-02 PROCEDURE — 65270000029 HC RM PRIVATE

## 2018-06-02 RX ORDER — NIFEDIPINE 30 MG/1
30 TABLET, EXTENDED RELEASE ORAL DAILY
Status: DISCONTINUED | OUTPATIENT
Start: 2018-06-02 | End: 2018-06-03 | Stop reason: HOSPADM

## 2018-06-02 RX ADMIN — IBUPROFEN 800 MG: 400 TABLET ORAL at 03:23

## 2018-06-02 RX ADMIN — MUPIROCIN: 20 OINTMENT TOPICAL at 09:00

## 2018-06-02 RX ADMIN — IBUPROFEN 800 MG: 400 TABLET ORAL at 09:28

## 2018-06-02 RX ADMIN — Medication 1 TABLET: at 09:28

## 2018-06-02 RX ADMIN — Medication 10 ML: at 17:38

## 2018-06-02 RX ADMIN — IBUPROFEN 800 MG: 400 TABLET ORAL at 18:03

## 2018-06-02 RX ADMIN — MUPIROCIN: 20 OINTMENT TOPICAL at 16:00

## 2018-06-02 RX ADMIN — NIFEDIPINE 30 MG: 30 TABLET, FILM COATED, EXTENDED RELEASE ORAL at 12:54

## 2018-06-02 NOTE — PROGRESS NOTES
06/02/18    5:45 PM  TRANSFER - IN REPORT:    Verbal report received from Oswaldo Chaves RN(name) on Prudence Flow  being received from [facility](unit) for routine progression of care      Report consisted of patients Situation, Background, Assessment and   Recommendations(SBAR). Information from the following report(s) SBAR, Kardex, Intake/Output, MAR, Recent Results and Med Rec Status was reviewed with the receiving nurse. Opportunity for questions and clarification was provided. Assessment completed upon patients arrival to unit and care assumed. 7:03 PM  Bedside and Verbal shift change report given to [address] [place] [address] (oncoming nurse) by Valerie Sewell RN (offgoing nurse). Report included the following information SBAR, Kardex, Intake/Output, MAR, Recent Results and Med Rec Status.

## 2018-06-02 NOTE — PROGRESS NOTES
TRANSFER - OUT REPORT:    Verbal report given to ELICEO Ellsworth(name) on [facility]  being transferred to [facility](unit) for routine progression of care , overflow of L&D      Report consisted of patients Situation, Background, Assessment and   Recommendations(SBAR). Information from the following report(s) SBAR, Procedure Summary, Intake/Output, MAR and Recent Results was reviewed with the receiving nurse. Lines:   Peripheral IV 05/31/18 Right Hand (Active)   Site Assessment Clean, dry, & intact 6/1/2018  8:58 PM   Phlebitis Assessment 0 6/1/2018  8:58 PM   Infiltration Assessment 0 6/1/2018  8:58 PM   Dressing Status Clean, dry, & intact 6/1/2018  8:58 PM   Dressing Type Transparent 6/1/2018  8:58 PM   Hub Color/Line Status Pink 6/1/2018  8:58 PM   Alcohol Cap Used Yes 6/1/2018  3:00 PM        Opportunity for questions and clarification was provided.       Patient transported with:   Registered Nurse  Tech

## 2018-06-02 NOTE — PROGRESS NOTES
TRANSFER - OUT REPORT:    Verbal report given to [employer] on Yotomo  being transferred to room 216 L&D for routine progression of care       Report consisted of patients Situation, Background, Assessment and   Recommendations(SBAR). Information from the following report(s) Kardex, MAR, Recent Results and Cardiac Rhythm NSR, Sinus [place] was reviewed with the receiving nurse. Lines:   Peripheral IV 05/31/18 Right Hand (Active)   Site Assessment Clean, dry, & intact 6/1/2018  8:58 PM   Phlebitis Assessment 0 6/1/2018  8:58 PM   Infiltration Assessment 0 6/1/2018  8:58 PM   Dressing Status Clean, dry, & intact 6/1/2018  8:58 PM   Dressing Type Transparent 6/1/2018  8:58 PM   Hub Color/Line Status Pink 6/1/2018  8:58 PM   Alcohol Cap Used Yes 6/1/2018  3:00 PM        Opportunity for questions and clarification was provided.       Patient transported with:   [employer]

## 2018-06-02 NOTE — LACTATION NOTE
This note was copied from a baby's chart. Mom only pumping due to baby being in NICU. Mom states she has pumped each time she eats. I instructed her to pump 8 times in a day. Mom states\" I am getting a letter and going to [address] on Monday to get a pump. \"  Mom knows how to use hand pump that came with kit.   Also instructed Mom to bring her pump parts to NICU to pump at baby's bedise when she comes to visit

## 2018-06-02 NOTE — PROGRESS NOTES
Post-Partum Day Number 1 Progress Note    Marily HUDSON Davey     Assessment: Doing well, post partum day 1    Plan:  1. Continue routine postpartum and perineal care as well as maternal education. 2. Procardia 30 xl for elevated BP needs to be seen within 3-5 days after discharge to check BP    Information for the patient's :  Livan Martell, Male [[id number]]   Vaginal, Spontaneous Delivery   Patient doing well without significant complaint. Voiding without difficulty, normal lochia. Vitals:  Visit Vitals    /90 (BP 1 Location: Right arm, BP Patient Position: Supine)    Pulse (!) 59    Temp 97.7 °F (36.5 °C)    Resp 18    LMP 2017    SpO2 99%    Breastfeeding Yes     Temp (24hrs), Av.1 °F (36.7 °C), Min:97.7 °F (36.5 °C), Max:98.4 °F (36.9 °C)        Exam:   Patient without distress. Abdomen soft, fundus firm, nontender                Perineum with normal lochia noted. Lower extremities are negative for swelling, cords or tenderness.     Labs:   Lab Results   Component Value Date/Time    WBC 17.5 (H) 2018 06:50 PM    HGB 12.7 2018 06:50 PM    HCT 37.7 2018 06:50 PM    PLATELET 468  06:50 PM

## 2018-06-02 NOTE — PROGRESS NOTES
0300  TRANSFER - IN REPORT:    Verbal report received from [address] (name) on June Kleine  being received from LD (unit) for routine progression of care      Report consisted of patients Situation, Background, Assessment and   Recommendations(SBAR). Information from the following report(s) SBAR, Kardex, Intake/Output, MAR, Accordion and Recent Results was reviewed with the receiving nurse. Opportunity for questions and clarification was provided. Assessment completed upon patients arrival to unit and care assumed. Patient went down to see the baby. Visit Vitals    BP (!) 141/97 (BP 1 Location: Left arm, BP Patient Position: Supine)    Pulse 66    Temp 98 °F (36.7 °C)    Resp 17    LMP 09/20/2017    SpO2 99%    Breastfeeding Yes     0730  Bedside and Verbal shift change report given to [address] (oncoming nurse) by Yaquelin Hebert RN (offgoing nurse). Report included the following information SBAR, Kardex, Procedure Summary, Intake/Output, MAR, Accordion and Recent Results.

## 2018-06-02 NOTE — PROGRESS NOTES
Post-Partum Day Number 1 Progress Note    Patient doing well post-partum without significant complaint. Pain well controlled. Lochia minimum. Tolerating diet diet. Ambulating. Voiding without difficulty. + flatus. no BM. Vitals:  Patient Vitals for the past 8 hrs:   BP Temp Pulse Resp SpO2   18 0755 144/90 97.7 °F (36.5 °C) (!) 59 18 99 %   18 0302 (!) 141/97 98 °F (36.7 °C) 66 17 99 %     Temp (24hrs), Av.1 °F (36.7 °C), Min:97.7 °F (36.5 °C), Max:98.4 °F (36.9 °C)      Intake/Output Summary (Last 24 hours) at 18 0848  Last data filed at 18 0400   Gross per 24 hour   Intake              110 ml   Output             1100 ml   Net             -990 ml       Exam:  Patient without distress. CTAB. RR, +S1 and S2, no m/r/g. Abdomen soft, fundus firm at level of umbilicus, nontender. Lower extremities:  No edema. No palpable cords or tenderness. Lab/Data Review:  No results found for this or any previous visit (from the past 12 hour(s)). Immunization History   Administered Date(s) Administered    Influenza Vaccine (Quad) PF 01/15/2018    Tdap 2018       Assessment and Plan:    Rashi Baird is a 23 y.o.  s/p  at 36 weeks 2 days. Uncomplicated post-partum course. 1. Continue routine perineal care and maternal education. 2. Elevated BPs: No known h/o hypertension of PreE. patient states she was nervous when it was taken. Will consider Pre E workup as patient is young and  race  3. Plan discharge tomorrow if no problems occur. Patient to be with Dr. Carola Shepard. Fernando Kohli MD  Family Medicine Resident

## 2018-06-02 NOTE — PROGRESS NOTES
Client off unit by wheelchair to room 215 with [facility] and Family.

## 2018-06-03 VITALS
SYSTOLIC BLOOD PRESSURE: 135 MMHG | HEART RATE: 94 BPM | DIASTOLIC BLOOD PRESSURE: 82 MMHG | RESPIRATION RATE: 16 BRPM | TEMPERATURE: 98.2 F | OXYGEN SATURATION: 98 %

## 2018-06-03 PROCEDURE — 74011250637 HC RX REV CODE- 250/637: Performed by: STUDENT IN AN ORGANIZED HEALTH CARE EDUCATION/TRAINING PROGRAM

## 2018-06-03 RX ORDER — IBUPROFEN 800 MG/1
800 TABLET ORAL
Qty: 30 TAB | Refills: 0 | Status: SHIPPED | OUTPATIENT
Start: 2018-06-03 | End: 2022-06-30 | Stop reason: SDUPTHER

## 2018-06-03 RX ORDER — NIFEDIPINE 30 MG/1
30 TABLET, EXTENDED RELEASE ORAL DAILY
Qty: 30 TAB | Refills: 0 | Status: SHIPPED | OUTPATIENT
Start: 2018-06-03 | End: 2018-06-22

## 2018-06-03 RX ADMIN — Medication 1 TABLET: at 10:21

## 2018-06-03 RX ADMIN — IBUPROFEN 800 MG: 400 TABLET ORAL at 10:21

## 2018-06-03 RX ADMIN — NIFEDIPINE 30 MG: 30 TABLET, FILM COATED, EXTENDED RELEASE ORAL at 10:21

## 2018-06-03 NOTE — DISCHARGE SUMMARY
Obstetrical Discharge Summary     Name: Franny Dang MRN: [id number]  SSN: [id number]    YOB: 1995  Age: 23 y.o. Sex: female      Admit Date: 2018    Discharge Date: 6/3/2018     Admitting Physician: Juan Stokes MD     Attending Physician:  Juan Stokes MD     Admission Diagnoses: Pregnancy  Pregnancy  IUGR (intrauterine growth restriction) affecting care of mother    Discharge Diagnoses:   Information for the patient's :  Leopoldo Milian, Male [[id number]]   Delivery of a 1.85 kg male infant via Vaginal, Spontaneous Delivery on 2018 at 7:33 AM  by . Apgars were 4 and 8. Additional Diagnoses:   Hospital Problems  Date Reviewed: 2018          Codes Class Noted POA    Pregnancy ICD-10-CM: Z34.90  ICD-9-CM: V22.2  2018 Unknown        IUGR (intrauterine growth restriction) affecting care of mother ICD-10-CM: O36.5990  ICD-9-CM: 656.50  2018 Unknown             Lab Results   Component Value Date/Time    Rubella, External Immune 2018    GrBStrep, External negative 2018       Immunization(s):   Immunization History   Administered Date(s) Administered    Influenza Vaccine (Quad) PF 01/15/2018    Tdap 2018        Hospital Course: Patient is a 23 y.o. [id number] s/p  at 39 weeks 2 days.  Presented for induction of labor in setting of IUGR and oligohydramnios. Pregnancy uncomplicated. Labor induced with cervadil resulting in  of pre-term live male infant with 1st degree and left periurethral tear. Patient developed elevated BPs of 140s/90s on multiple readings greater than 4 hours apart concerning for the development of gestational hypertension. Patient was started on Nifedipine 30mg XL daily and tolerated well. Patient denied symptoms of pre-E. On day of discharge patient reported minimal lochia, well controlled pain, and no other complaints.  Discharged with pain regimen and bowel regimen.   Advised to continue prenatal vitamins.  Follow up with PCP in 3 days for BP check and again 6 weeks post-partum. Condition at Discharge:  Stable    Patient Instructions:   Current Discharge Medication List      START taking these medications    Details   ibuprofen (MOTRIN) 800 mg tablet Take 1 Tab by mouth every eight (8) hours as needed for Pain. Qty: 30 Tab, Refills: 0      NIFEdipine ER (PROCARDIA XL) 30 mg ER tablet Take 1 Tab by mouth daily. Qty: 30 Tab, Refills: 0         CONTINUE these medications which have NOT CHANGED    Details   prenatal vit-calcium-iron-fa (PRENATAL PLUS WITH CALCIUM) 27 mg iron- 1 mg tab Take 1 Tab by mouth daily. Qty: 30 Tab, Refills: 11    Associated Diagnoses: 16 weeks gestation of pregnancy; Encounter for immunization; Pregnancy confirmed by positive urine test         STOP taking these medications       pyridoxine, vitamin B6, (VITAMIN B-6) 50 mg tablet Comments:   Reason for Stopping:               Reference my discharge instructions. No orders of the defined types were placed in this encounter. Signed by:  Germania Awan MD  Resident, PGY-1    Marni 3, 2018   10:18 AM

## 2018-06-03 NOTE — DISCHARGE INSTRUCTIONS
Take ibuprofen every 8 hours as needed for pain. Continue to take your prenatal vitamin and blood pressure medication daily. You will need to go to your doctor's appointment on 6/6/18 at 10:40a to have a blood pressure check. El período posparto: Instrucciones de cuidado - [ Postpartum: Care Instructions ]  Instrucciones de cuidado    Después del parto (período posparto), russell cuerpo pasa por muchos cambios. Algunos de estos cambios suceden gigi varias semanas. [address], el cuerpo comienza a recuperarse y se prepara para el amamantamiento. Es posible que [address] se sienta sensible. Las hormonas pueden cambiar russell estado de ánimo sin advertencia y sin motivo aparente. Gigi las primeras [address] después del parto, muchas mujeres tienen emociones que [place] de titus a dany. Es posible que le resulte difícil dormir. Es posible que llore mucho. Eudora se llama \"melancolía de la maternidad\". Estas emociones abrumadoras suelen desaparecer dentro de unos días o semanas. Tripp es importante hablar con russell médico acerca de destin sentimientos. Gigi las primeras semanas después del [place], es fácil cansarse demasiado o sentirse [place]. No richie demasiados esfuerzos. Descanse cada vez que pueda, acepte la ayuda de los demás, coma emi y kiran abundantes líquidos. Entre 4 y 6 semanas después del nacimiento de russell bebé, tendrá donna consulta de seguimiento con russell médico. Esta visita es russell oportunidad para hablar con russell médico sobre cualquier inquietud o rafael que tenga. La atención de seguimiento es donna parte clave de russell tratamiento y seguridad. Asegúrese de hacer y acudir a todas las citas, y llame a russell médico si está teniendo problemas. También es donna buena idea saber los resultados de los exámenes y mantener donna lista de los medicamentos que cyril. ¿Cómo puede cuidarse en el hogar? · Duerma o descanse cuando russell bebé lo richie.   · Si es posible, permita que destin familiares o destin amigos la ayuden con las tareas del hogar. No intente hacerlo todo usted jeff. · Si tiene hemorroides o hinchazón o dolor alrededor de la abertura de la vagina, pruebe aplicarse frío y calor. Puede aplicarse hielo o donna compresa fría en la abhijit gigi 10 a 20 minutos cada vez. Póngase un paño daniel entre el hielo y la piel. Además, trate de sentarse en algunos centímetros de agua tibia (baño de asiento) 3 veces al día y después de las evacuaciones. · [employer] analgésicos (medicamentos para el dolor) exactamente según las indicaciones. ¨ Si el médico le recetó un analgésico, tómelo según las indicaciones. ¨ Si no está tomando un analgésico recetado, pregúntele a russell médico si puede patsy lindsay de [employer]. · Coma más fibra para evitar el estreñimiento. Incluya alimentos krystin panes y cereales integrales, verduras crudas, frutas crudas y secas, y frijoles (habichuelas). · Mary abundantes líquidos, los suficientes krystin para que russell orina sea de color amarillo polo o transparente krystin el agua. Si tiene [employer], del corazón o del hígado y tiene que Rosas's líquidos, hable con russell médico antes de aumentar russell consumo. · No se lave el interior de la vagina con líquidos (lavados vaginales). · Si tiene puntos de sutura, mantenga la abhijit limpia con agua tibia, ya sea echándola o rociándola sobre la abhijit externa de la vagina y el ano después de usar el baño. · Elijah donna lista de preguntas y llévela a russell consulta posparto. Lis preguntas podrían tratar sobre:  ¨ [employer] senos, krystin bultos o sensibilidad. ¨ Cuándo esperar que regrese russell período menstrual.  ¨ Qué forma de anticoncepción es la más adecuada para usted. ¨ El peso que aumentó gigi el Aleatha Dory. ¨ Las opciones de ejercicio. ¨ Qué alimentos y bebidas son mejores para usted, sobre todo si está amamantando. ¨ Problemas que podría tener con la lactancia. ¨ Cuándo puede [employer].  Algunas mujeres janna vez quieran hablar sobre lubricantes vaginales. ¨ Cualquier sentimiento de tristeza o inquietud que tenga. ¿Cuándo debe pedir ayuda? Llame al 911 en cualquier momento que considere que necesita atención de [place]. Por ejemplo, llame si:  ? · Tiene pensamientos sobre [place] daño a sí misma, a russell bebé o a otra persona. ? · Se desmayó (perdió el conocimiento). ?Llame a russell médico ahora mismo o busque atención médica inmediata si:  ? · Russell sangrado vaginal parece hacerse más abundante. ? · Se siente mareada o aturdida, o que está a punto de [place]. ? · Tiene fiebre. ?Preste especial atención a los cambios en russell cristi y asegúrese de comunicarse con russell médico si:  ? · Tiene nuevo flujo vaginal o fausto empeora. ? · Se siente dany o deprimida. ? · Tiene problemas con los senos o el amamantamiento. ¿Dónde puede encontrar más información en inglés? Racheal Gfof a [email or web address].info/. Stefania Vieyra [id number] en la búsqueda para aprender más acerca de \"El período posparto: Instrucciones de cuidado - [ Postpartum: Care Instructions ]. \"  Revisado: 16 marzo, 2017  Versión del contenido: 11.4  © 7808-6044 Healthwise, Incorporated. Las instrucciones de cuidado fueron adaptadas bajo licencia por Good Help Connections (which disclaims liability or warranty for this information). Si usted tiene [place] afección médica o sobre estas instrucciones, siempre pregunte a russell profesional de cristi. Healthwise, Incorporated niega toda garantía o responsabilidad por russell uso de esta información. Desgarro perineal: Sienna Lilli en el hogar - [ Perineal Tear: What to Expect at Home ]  Russell recuperación  Un desgarro perineal puede ocurrir cuando usted da a doe a russell hijo (parto). Es un desgarro en el perineo, que es el área entre la vagina y el ano. Después del [place], el médico o la partera generalmente cierran el desgarro perineal con puntos de sutura.  Los puntos de sutura se disolverán en 1 a 2 semanas, por lo que no es necesario quitarlos. Usted podría notar fragmentos de los puntos en russell toalla sanitaria o en el papel higiénico cuando vaya al baño. [place] es normal.  Algunas veces, un desgarro pequeño no se cerrará con puntos de sutura y se dejará sanar por sí mismo. Puede usar donna compresa de hielo sobre el perineo para aliviar el dolor y la hinchazón. La recuperación puede ser incómoda o dolorosa, dependiendo de la profundidad y la longitud del desgarro. Es más dolorosa al comienzo, tripp usted debería sentirse mejor cada día. El dolor típicamente afecta sentarse, caminar, orinar y las evacuaciones intestinales, al menos por [place]. Russell primera evacuación intestinal podría resultarle dolorosa. Un desgarro suele sanar en aproximadamente 4 a 6 semanas. Esta hoja de [employer] idea general sobre cuánto tiempo tardará en recuperarse. Tripp cada whitney se recupera a un ritmo diferente. Siga los pasos a continuación para sentirse mejor acevedo pronto krystin sea posible. ¿Cómo puede cuidarse en el hogar? Actividad  ? · Descanse cuando se sienta fatigada. Dormir lo suficiente le ayudará a recuperarse. ? · Trate de caminar todos los días. Comience caminando un poco más de lo que caminó el día anterior. Poco a poco, aumente la cantidad que camina. Caminar aumenta el flujo de [place] y [place] a prevenir la neumonía y el estreñimiento. ? · Evite actividades arduas, krystin [employer], trotar, levantar pesas o hacer ejercicio aeróbico, hasta que russell médico le diga que Bose. ? · Hasta que russell médico lo apruebe, no levante nada más pesado que russell bebé. ? · Pregúntele a urssell médico cuándo puede volver a conducir. ? · Puede patsy duchas y mary igual que siempre. Seque la herida con toques suaves de toalla cuando termine. ? · Tendrá algún sangrado vaginal. Use toallas sanitarias. No se richie lavados vaginales ni use tampones hasta que el médico se lo permita.    ? · Pregúntele a russell médico cuándo [employer] relaciones sexuales. ?Dieta  ? · Puede comer russell dieta normal.   ? · Mary abundantes líquidos (a menos que russell médico le diga que no). ? · Podría notar que ash después del parto no evacúa el intestino con regularidad. [place] es común. Trate de evitar el estreñimiento y de no hacer esfuerzos cuando evacúa el intestino. Lethaniel Lewis patsy un suplemento de [place]. Si no ha evacuado el intestino después de un par de días, pregúntele a russell médico si puede patsy un laxante suave. Medicamentos  ? · Russell médico le dirá si puede volver a patsy destin medicamentos y cuándo puede volver a hacerlo. También le dará indicaciones sobre cualquier medicamento nuevo que deba patsy usted. ? · Si cyril medicamentos que previenen la formación de coágulos de [place], krystin warfarina (Coumadin), clopidogrel (Plavix) o aspirina, asegúrese de hablar con russell médico. Él o arvin le dirá si debe volver a patsy estos medicamentos y en qué momento. Asegúrese de que entiende exactamente lo que el médico quiere que richie. ? · Si tiene dolor, tome los analgésicos (medicamentos para el dolor) exactamente krystin le fueron indicados. ¨ Si el médico le recetó un analgésico, tómelo según las indicaciones. ¨ Si no está tomando un analgésico recetado, pregúntele a russell médico si puede patsy un medicamento de [employer]. ? · Si usted piensa que russell analgésico le está provocando malestar estomacal:  ¨ Mutual russell medicamento después de las comidas (a menos que russell médico le haya dicho que no). ¨ Pídale a russell médico un analgésico diferente. ?Cuidado de la herida  ? · Aplique hielo o donna compresa fría sobre la abhijit adolorida por 10 a 20 minutos a la vez. Póngase un paño daniel entre el hielo y la piel. ? · Siéntese en unos pocos centímetros de agua tibia (baño de asiento) gigi 15 a 20 minutos 3 veces al día, y después de evacuar el intestino. Luego seque la abhijit con toques suaves de toalla. Hágalo mientras tenga dolor.  Posiblemente note que secar la abhijit con un secador de pelo en vez de donna toalla es menos doloroso. ? · Mantenga la abhijit limpia con agua tibia, ya sea echándola o rociándola sobre la abhijit externa de la vagina y el ano después de usar el baño. Utilice toallitas para bebés o toallitas medicinales, krystin T[place], en lugar de papel higiénico después de evacuar el intestino. La atención de seguimiento es donna parte clave de russell tratamiento y seguridad. Asegúrese de hacer y acudir a todas las citas, y llame a russell médico si está teniendo problemas. También es donna buena idea saber los resultados de los exámenes y mantener donna lista de los medicamentos que cyril. ¿Cuándo debe pedir ayuda? Llame al 911 en cualquier momento que considere que necesita atención de emergencia. Por ejemplo, llame si:  ? · Se desmayó (perdió el conocimiento). ?Llame a russell médico ahora mismo o busque atención médica inmediata si:  ? · Tiene sangrado vaginal intenso. Está expulsando coágulos de [place] y empapa donna toalla sanitaria cada hora por 2 horas o más. ? · Siente mareos o aturdimiento, o siente que está a punto de [place]. ? · Tiene fiebre. ? · Tiene dolor o hinchazón nuevos o más intensos en la abhijit vaginal o anal.   ?Preste especial atención a los cambios de russell cristi y asegúrese de comunicarse con russell médico si:  ? · El sangrado vaginal parece hacerse más landy. ? · Tiene nuevo flujo vaginal o éste empeora. ? · Se siente dany, ansiosa o desesperanzada [address] de unos días. ? · No mejora krystin se esperaba. ¿Dónde puede encontrar más información en inglés? Jigar rodas [email or web address].info/. Scooter Spotted Q572 en la búsqueda para aprender más acerca de \"Desgarro perineal: Exie Shwetha en el hogar - [ Perineal Tear: What to Expect at [facility] ]. \"  Revisado: 16 marzo, 2017  Versión del contenido: 11.4  © 6973-0368 Healthwise, Incorporated.  Las instrucciones de cuidado fueron adaptadas bajo licencia por Good Help Connections (which disclaims liability or warranty for this information). Si usted tiene [place] afección médica o sobre estas instrucciones, siempre pregunte a turner profesional de cristi. [facility], Incorporated niega toda garantía o responsabilidad por turner uso de esta información. Estrés en los padres de bebés: Instrucciones de cuidado - [ Stress in Parents of Infants: Care Instructions ]  Instrucciones de cuidado    Cumplir con las grandes demandas de ser un nuevo padre puede ser un gran desafío. Es fácil cansarse en exceso y abrumarse en las primeras semanas. Lo que antes era donna tarea sencilla, misael ir de compras, no es acevedo sencillo ahora. Además, ahora tiene nuevas tareas, entre ellas alimentar y Lanetta Lerma a turner nuevo bebé. Al final del día, janna vez esté acevedo cansado que desee llorar. En lugar de esperar con ilusión el día siguiente, janna vez le kranthi al [employer]. Misael [employer] padres, usted está consumido por el estrés de tener un nuevo bebé. El estrés afecta a cada quien de [place], y las maneras más efectivas de aliviarlo son distintas para cada persona. Usted puede probar con diferentes métodos para averiguar cuáles le funcionan mejor. Con el paso de [employer], comenzará a desarrollar un ritmo con turner bebé. Las tareas que ahora parecen tomarle eternidades se harán más fáciles. Muchas mujeres sienten la \"melancolía de la maternidad\" gigi los primeros días después del Sherlyn. Si usted es donna nueva madre y la \"melancolía de la maternidad\" dura más de unos pocos días, llame a turner médico de inmediato. La depresión es donna afección médica que requiere tratamiento. La atención de seguimiento es donna parte clave de turner tratamiento y seguridad. Asegúrese de hacer y acudir a todas las citas, y llame a turner médico si está teniendo problemas. También es donna buena idea saber los resultados de los exámenes y mantener donna lista de los medicamentos que cyril. ¿Cómo puede cuidarse en el hogar? · Sea padron con usted mismo.  Turner nuevo bebé conlleva [facility], lorena también puede darle mucha satisfacción. No se preocupe por la casa gigi un tiempo. · Permita que destin amigos le lleven comida o leidy las tareas del hogar. · Limite los visitantes al número que pueda [place], o pídales que no lo visiten por un tiempo. Antes de que vengan, ponga un límite al [employer]. · Duerma cuando russell bebé duerma. Incluso donna siesta corta ayuda. · [place] qué desencadena russell estrés, y evite esas cosas tanto krystin pueda. · Si amamanta, aprenda cómo acumular y [employer] materna para que russell denisse o niñera pueda alimentar a russell bebé mientras usted duerme. · Coma donna dieta balanceada para poder mantener russell energía. · Mary abundantes líquidos gigi todo el día. · Evite la cafeína y el alcohol. La cafeína se encuentra en el café, el té, las bebidas de cola, el chocolate y otros alimentos. · Limite los medicamentos que pueden hacerlo sentirse cansado, krystin los tranquilizantes y los medicamentos para el resfriado y las [employer]. · Elijah ejercicio regularmente todos los días, krystin caminar, para ayudar a mejorar russell estado de ánimo. Después de hacer ejercicio, descanse. · Sea honesto con usted mismo y con destin seres queridos. Dígales que está estresado y cansado. · Hablar con otros nuevos padres puede ayudar. Russell hospital local podría tener grupos de apoyo para nuevos padres. Escuchar que otra persona está teniendo las mismas experiencias puede [employer]. · Si tiene la melancolía de la maternidad y persiste más de unos pocos días, llame a russell médico de inmediato. ¿Cuándo debe pedir ayuda? Llame al 911 en cualquier momento que considere que necesita atención de emergencia. Por ejemplo, llame si:  ? · Tiene pensamientos sobre [place] daño a usted mismo, russell bebé u otra persona. ?Llame a russell médico ahora mismo o busque atención médica inmediata si:  ? · Tiene problemas para cuidar de usted mismo o russell bebé.    ?Preste especial atención a los cambios en russell cristi y asegúrese de comunicarse con russell médico si tiene algún problema. ¿Dónde puede encontrar más información en inglés? Karuna Scrivener a [email or web address].[email or web address] Iselaba H142 en la búsqueda para aprender más acerca de \"Estrés en los padres de bebés: Instrucciones de cuidado - [ Paloma Poisson in Parents of Infants: Care Instructions ]. \"  Revisado: [address] [place], 2017  Versión del contenido: 11.4  © 3749-7364 Healthwise, Incorporated. Las instrucciones de cuidado fueron adaptadas bajo licencia por Good Help Connections (which disclaims liability or warranty for this information). Si usted tiene [place] afección médica o sobre estas instrucciones, siempre pregunte a russell profesional de cristi. Healthwise, Incorporated niega toda garantía o responsabilidad por russell uso de esta información. Aprenda sobre cómo empezar a amamantar - [ Missy Doctor About Starting to Breastfeed ]  Cómo planificar con antelación    Planee por adelantado, antes de que nazca russell bebé. Aprenda todo lo que pueda acerca del amamantamiento. [place] le facilitará el amamantamiento. · A principios de russell embarazo, hable con russell médico o comadrona (partera) sobre el amamantamiento. · Aprenda los conceptos básicos antes de que nazca russell bebé. El personal en hospitales y centros de maternidad puede ayudarla a encontrar un especialista en lactancia. Esta persona suele ser Alexa Amass enfermera que está capacitada para enseñar y aconsejar a las mujeres sobre el amamantamiento. O emi, puede patsy [employer]V de [place]. · Planee con anticipación los momentos en los que necesitará ayuda después de que nazca russell bebé. Muchas mujeres reciben ayuda de destin familiares y [place]. Algunas se unen a un mercy de apoyo para hablar con otras madres que amamantan a destin bebés. · Compre los suministros que Otila Skeeters a necesitar. Por ejemplo, almohadillas de lactancia, crema para los pezones, almohadas adicionales y sostenes de lactancia.  Rickey Cuevas también sobre extractores [place]. [place] del hospital o del centro de maternidad  Es importante que tenga [de-identified] de los médicos, las enfermeras y el personal hospitalario que los cuidan a usted y a russell bebé. Antes de que llegue el momento de felicia a doe, pregunte sobre las políticas de lactancia de russell hospital o [facility]ro [facility] DEL[facility]TE Busque un hospital o centro de maternidad que tenga un reglamento para:  · Alojamiento conjunto (\"Rooming in\"). Esta política es favorable a que usted tenga a russell bebé [employer] habitación. Puede permitirle amamantar con más frecuencia. · Alimentación suplementaria. Informe al personal de que russell bebé debe recibir [employer] materna desde russell nacimiento. Si el personal le da a russell bebé agua, donna solución azucarada o Mylo de fórmula inmediatamente después de nacer sin razón Melinda, esto puede dificultarle a usted el amamantamiento. · Chupones o pezones artificiales. El personal no debería darle a russell recién nacido estos artículos sin russell permiso. Podrían interferir con el amamantamiento. · Seguimiento. Pregunte si el hospital puede ayudarla con problemas de amamantamiento donna vez que Maryam Relic a russell hogar. Trate de [employer] Corporation grupos de apoyo u otro tipo de contacto. Juan Pablo Marker le lukas útiles si necesita ayuda para establecer y mantener donna rutina de amamantamiento. Russell primera cyril  Lo mejor es comenzar el [address] de 1 hora después del [place]. En cada cyril, usted pasa por estos pasos básicos:  · Prepárese para la alimentación. Manténgase calmada y [place], y trate de no distraerse. Tenga agua o jugo a mano para patsy usted. Ruth Murray Incorporated o cathy almohadas para ayudar a sostener al bebé [employer] se [place]. · Encuentre donna posición para amamantar que sea cómoda tanto para usted krystin para russell bebé. Los ejemplos incluyen la posición de cuna y la posición de fútbol americano.  Asegúrese de que la jens y el pecho del bebé estén alineados y [place] russell pecho. Es mejor cambiar el seno con el que Marta [place]dh[place]. · Consiga que el bebé se prenda apropiadamente. La boca del bebé debe estar emi abierta, krystin en un bostezo, por lo que puede que tenga que tocar delicadamente el centro del labio inferior de russell bebé. Cuando la boca del bebé esté emi abierta, acerque al bebé rápidamente al pezón y a la areola. La areola es la abhijit oscura alrededor del pezón. · Sukumar al bebé donna cyril completa. Deje que el bebé raya gigi al menos 15 minutos. Asegúrese de hacer eructar al bebé después de alimentarse de cada seno. En los primeros días después del nacimiento, lis senos elaboran un líquido espeso de color amarillo llamado calostro. Vonda líquido le proporciona al bebé nutrientes y anticuerpos para combatir las infecciones. Es todo lo que los bebés necesitan al principio. Lis senos se llenarán de [place] unos renny después del nacimiento. Hable de inmediato con russell médico, comadrona o especialista en lactancia si tiene problemas y no sabe qué hacer. Frecuencia con la que amamantar  Amamante a russell bebé cuando lo pida en lugar de establecer un horario estricto. Gigi los primeros días, espere amamantar con donna frecuencia de 1 a 3 horas. [place] suele ser alrededor de 8 a 12 veces en un período de 24 horas. Despierte a un bebé que esté dormido para alimentarlo, si es necesario. Si amamanta con más frecuencia, esto ayudará a que lis senos produzcan [place]. Después de la vuelta al hogar  Después de que vuelva a russell hogar, no dude en llamar a russell médico, comadrona o especialista en lactancia si tiene preguntas. Debería hacer esto incluso si no sabe cuál es el problema. Estos profesionales están acostumbrados a que [employer] padres de recién nacidos. Pueden ayudarla a averiguar si hay un problema y, si es así, a solucionarlo. Planee los momentos en que usted estará separada del bebé. Use un sacaleches para extraerse leche con anticipación.  Puede almacenar la [employer]	Giles refrigerador o en el congelador. Así estará preparada cuando otra persona cuide a russell bebé. Amamantar es donna habilidad que se aprende y se torna más fácil con el tiempo. Usted tiene más probabilidades de tener éxito si planea con anticipación, aprende las técnicas básicas y [facility] dónde [place] y [de-identified]. ¿Dónde puede encontrar más información en inglés? Racheal Goff a [email or web address].info/. Stefania Vieyra B159 en la búsqueda para aprender más acerca de \"Aprenda sobre cómo empezar a amamantar - [ Marie Regulus About Starting to Breastfeed ]. \"  Revisado: 16 marzo, 2017  Versión del contenido: 11.4  © 2288-9587 Healthwise, [employer]. Las instrucciones de cuidado fueron adaptadas bajo licencia por Good Help Connections (which disclaims liability or warranty for this information). Si usted tiene [place] afección médica o sobre estas instrucciones, siempre pregunte a russell profesional de cristi. [employer], [employer] niega toda garantía o responsabilidad por russell uso de esta información.

## 2018-06-03 NOTE — PROGRESS NOTES
0830 - Rounding to determine patient needs. No voiced needs. FOB at bedside, supportive. Infant in NICU.   0900 - Pt in NICU.    0945 - Am dose of procardia given. 1030 - NICU told RN that infant likely for discharge tomorrow. Inquire into possibility of patient discharge with hospital boarding capabilities this evening. Resident and hospitalist Guillermo informed and agreeable to this plan of care. 1130 - Pt informed of boarding possability and agreeable. Understands that prescription for procardia will need to be filled prior to discharge. 1300 - Rounding to determine pt needs. Pt and FOB have decided to be discharged this afternoon and will return in the AM to see baby in NICU. 1420 - NICU RN currently at bedside assisting patient to breastfeed  1430 - Discharge instructions gone over with patient. Pt declines use of blue  phone at this time. /82 and stable and bleeding WNL. S/S of bleeding, pre E and increasing BP, infection. Time allowed for questions. Pt verbalizes understanding. Pt remaining with friends to board this evening. Prescriptions given for Motrin and Procardia. Pt to take procardia every day. Pt to go to [facility] office on 6/6 for BP check. FOB will return later.

## 2018-06-03 NOTE — PROGRESS NOTES
Post-Partum Day Number 2 Progress/Discharge Note    Patient doing well post-partum without significant complaint. Pain well controlled. Lochia normal.  Tolerating full diet. Ambulating. Voiding without difficulty. Has passed flatus. No BM. Patient denies HA, vision changes, LE swelling, or RUQ pain. Vitals:  No data found. Temp (24hrs), Av.4 °F (36.9 °C), Min:98.1 °F (36.7 °C), Max:98.8 °F (37.1 °C)      Vital signs stable, afebrile. Exam:  Patient without distress. CTAB, no w/r/r/c               RRR, + S1 and S2, no m/r/g               Abdomen soft, fundus firm 2 inches below the umbilicus, non tender               Perineum with normal lochia noted. Lower extremities are negative for swelling, cords or tenderness. Lab/Data Review:  No results found for this or any previous visit (from the past 12 hour(s)). Immunizations:  Immunization History   Administered Date(s) Administered    Influenza Vaccine (Quad) PF 01/15/2018    Tdap 2018         Assessment and Plan:      Tamara Crespo is a 23 y.o.  s/p  at 36 weeks 2 days. Patient appears to be having uncomplicated post-partum course. 1. Continue routine perineal care and maternal education. 2. Elevated post-partum BPs concerning for GHTN - Pt without h/o of Pre-E or elevated BPs prior to or during pregnancy. Patient denies symptoms of pre-E.  1.  Continue Nefedipine 30mg XL as pressures have been better controlled on this. 2. Will need outpatient follow-up in 3 days for re-evaluate BPs.  3. Plan discharge for today with post partum follow-up and repeat BP check in office in 6 weeks. Patient seen and discussed with Dr. Angelika Jaimes.     Bennie Catalan MD  Family Medicine Resident

## 2018-06-06 ENCOUNTER — ROUTINE PRENATAL (OUTPATIENT)
Dept: FAMILY MEDICINE CLINIC | Age: 23
End: 2018-06-06

## 2018-06-06 VITALS
OXYGEN SATURATION: 98 % | WEIGHT: 108 LBS | TEMPERATURE: 98 F | DIASTOLIC BLOOD PRESSURE: 81 MMHG | SYSTOLIC BLOOD PRESSURE: 126 MMHG | RESPIRATION RATE: 17 BRPM | BODY MASS INDEX: 24.3 KG/M2 | HEIGHT: 56 IN | HEART RATE: 74 BPM

## 2018-06-06 DIAGNOSIS — O13.9 PREGNANCY INDUCED HYPERTENSION, ANTEPARTUM: ICD-10-CM

## 2018-06-06 LAB
BILIRUB UR QL STRIP: NEGATIVE
GLUCOSE UR-MCNC: NEGATIVE MG/DL
KETONES P FAST UR STRIP-MCNC: NORMAL MG/DL
PH UR STRIP: 5.5 [PH] (ref 4.6–8)
PROT UR QL STRIP: NORMAL
SP GR UR STRIP: 1.02 (ref 1–1.03)
UA UROBILINOGEN AMB POC: NORMAL (ref 0.2–1)
URINALYSIS CLARITY POC: NORMAL
URINALYSIS COLOR POC: YELLOW
URINE BLOOD POC: NORMAL
URINE LEUKOCYTES POC: NORMAL
URINE NITRITES POC: NEGATIVE

## 2018-06-06 NOTE — PROGRESS NOTES
Subjective:   Carlitos Maldonado is an 23 y.o. female who presents for postpartum visit and BP check. .    She is  s/p  after IOL at 36w2d for IUGR and oligohydramnios. Delivery was uncomplicated. During the hospital stay the patient's BPs were noted to be elevated. She was asymptomatic. She was put on Procardia 30 mg daily. She has been compliant with the medication, no side effects. No leg swelling, no blurry vision, no chest pain, no SOB. Bleeding - thin lochia. Bowel function is normal.   Bladder function is normal.   Patient is not sexually active with her boyfriend. Pt's desired method of family planning: Not sure yeat. No history of HTN, DVT, CAD, DM, liver disease, migraines. Postpartum depression screening: score = 3 (negative) - questionnaire will be scanned into [employer]). Baby's course has been doing well without problems. Baby is feeding breast.        Allergies - reviewed: Allergies   Allergen Reactions    Penicillins Hives         Medications - reviewed:  Current Outpatient Prescriptions   Medication Sig    ibuprofen (MOTRIN) 800 mg tablet Take 1 Tab by mouth every eight (8) hours as needed for Pain.  NIFEdipine ER (PROCARDIA XL) 30 mg ER tablet Take 1 Tab by mouth daily.  prenatal vit-calcium-iron-fa (PRENATAL PLUS WITH CALCIUM) 27 mg iron- 1 mg tab Take 1 Tab by mouth daily. No current facility-administered medications for this visit. Past Medical History - reviewed:  No past medical history on file. Past Surgical History - reviewed:  No past surgical history on file. Family History - reviewed:  No family history on file. Social History - reviewed:  Social History     Social History    Marital status: SINGLE     Spouse name: N/A    Number of children: N/A    Years of education: N/A     Occupational History    Not on file.      Social History Main Topics    Smoking status: Never Smoker    Smokeless tobacco: Never Used    Alcohol use No    Drug use: No    Sexual activity: Yes     Other Topics Concern    Not on file     Social History Narrative         Review of Systems   CONSTITUTIONAL: denies fever. Denies chills. EYES: denies double vision. Has blurry vision relieved with contacts. CARDIOVASCULAR: denies chest pain. Denies palpitations  RESPIRATORY: denies shortness of breath      Objective:     Visit Vitals    /81    Pulse 74    Temp 98 °F (36.7 °C) (Oral)    Resp 17    Ht 4' 8\" (1.422 m)    Wt 108 lb (49 kg)    LMP 09/20/2017    SpO2 98%    BMI 24.21 kg/m2       General appearance - alert, well appearing, and in no distress  Chest - clear to auscultation, no wheezes, rales or rhonchi, symmetric air entry  Heart - normal rate, regular rhythm, normal S1, S2, no murmurs, rubs, clicks or gallops  Abdomen - soft, nontender, nondistended, no masses or organomegaly  Neurological - alert, oriented, normal speech, no focal findings or movement disorder noted  Extremities - peripheral pulses normal, no pedal edema, no clubbing or cyanosis  Skin - normal coloration and turgor, no rashes, no suspicious skin lesions noted      Assessment:   24 yo female who is here for:     ICD-10-CM ICD-9-CM    1. Encounter for postpartum visit Z39.2 V24.2 AMB POC URINALYSIS DIP STICK AUTO W/O MICRO   2. Pregnancy induced hypertension, antepartum O13.9 642.33 PROT+CREATU (RANDOM)      METABOLIC PANEL, COMPREHENSIVE       Plan:   · Pregnancy induced hypertension. BP is at goal today. Continue Procardia as prescribed. Checking CMP and protein/creatinine ratio today. Come back in 2 weeks for BP recheck. · Postpartum depression screen is negative. Follow-up Disposition:  Return in about 2 weeks (around 6/20/2018) for Routine postpartum visit. I have discussed the diagnosis with the patient and the intended plan as seen in the above orders. The patient has received an after-visit summary and questions were answered concerning future plans. I have discussed medication side effects and warnings with the patient as well. Informed pt to return to the office if new symptoms arise.       Donita Castillo MD  Family Medicine Resident, PGY-2

## 2018-06-06 NOTE — MR AVS SNAPSHOT
[address] 
[phone] Patient: Prudence Flow MRN: [id number] Visit Information Date & Time Provider Department Dept. Phone Encounter #  
 2018 10:45 AM Madyson Hirsch MD [facility] [phone] [id number] Follow-up Instructions Return in about 5 weeks (around 2018) for Routine postpartum visit. Upcoming Health Maintenance Date Due Hepatitis A Peds Age 1-18 (1 of 2 - Standard Series) 1998 HPV Age 9Y-34Y (1 of 3 - Female 3 Dose Series) 2008 DTaP/Tdap/Td series (2 - Td) 2018 Influenza Age 5 to Adult 2018 Allergies as of 2018  Review Complete On: 6/3/2018 By: Lindsay Kong Severity Noted Reaction Type Reactions Penicillins  01/15/2018    Hives Current Immunizations  Reviewed on 6/3/2018 Name Date Influenza Vaccine (Quad) PF 1/15/2018 Tdap 3/27/2018 Not reviewed this visit You Were Diagnosed With   
  
 Codes Comments Encounter for postpartum visit    -  Primary ICD-10-CM: Z39.2 ICD-9-CM: V24.2 Pregnancy induced hypertension, antepartum     ICD-10-CM: O13.9 ICD-9-CM: 642.33 Vitals BP Pulse Temp Resp Height(growth percentile) Weight(growth percentile) 126/81 74 98 °F (36.7 °C) (Oral) 17 4' 8\" (1.422 m) 108 lb (49 kg) LMP SpO2 BMI OB Status Smoking Status 2017 98% 24.21 kg/m2 Recent pregnancy Never Smoker BMI and BSA Data Body Mass Index Body Surface Area  
 24.21 kg/m 2 1.39 m 2 Preferred Pharmacy Pharmacy Name Phone [id number]- [facility], [facility] [phone] Your Updated Medication List  
  
   
This list is accurate as of 18 11:19 AM.  Always use your most recent med list.  
  
  
  
  
 ibuprofen 800 mg tablet Commonly known as:  MOTRIN  
 Take 1 Tab by mouth every eight (8) hours as needed for Pain. NIFEdipine ER 30 mg ER tablet Commonly known as:  PROCARDIA XL Take 1 Tab by mouth daily. prenatal vit-calcium-iron-fa 27 mg iron- 1 mg Tab Commonly known as:  PRENATAL PLUS with CALCIUM Take 1 Tab by mouth daily. We Performed the Following AMB POC URINALYSIS DIP STICK AUTO W/O MICRO [55271 CPT(R)] METABOLIC PANEL, COMPREHENSIVE [13866 CPT(R)] PROT+CREATU (RANDOM) [id number] CPT(R)] Follow-up Instructions Return in about 5 weeks (around 7/11/2018) for Routine postpartum visit. To-Do List   
 06/07/2018 10:15 AM  
  Appointment with ULTRAS[facility] 3 [facility] at [facility] ([phone]) Patient Instructions Después del parto (período de posparto): Instrucciones de cuidado - [ After Your Delivery (the Postpartum Period): Care Instructions ] Instrucciones de cuidado Felicidades por el nacimiento de russell bebé. Al igual que el [facility], el tiempo con el recién nacido puede ser un momento de [place], [place] y agotamiento. Es posible que se sienta titus al mirar la saeid de russell pequeño bebé. También podría sentirse abrumada por russell nuevo ritmo de sueño y las nuevas responsabilidades. Al principio, los bebés suelen dormir gigi el día y permanecen despiertos gigi la noche. No tienen ningún patrón ni rutina. Podrían felicia gritos ahogados, sacudirse y despertarse, o parecer krystin si tuvieran los ojos cruzados (bizcos). Todo esto es normal, e incluso la pueden hacer sonreír. Gigi las primeras semanas siguientes al parto, trate de cuidarse emi. Podría tardar de 4 a 6 semanas en volver a sentirse usted misma, y posiblemente más tiempo si le harris hecho donna cesárea. Es probable que se sienta muy fatigada gigi varias semanas. Lis días estarán llenos de Radha, lorena también de mucha alegría.  
Glory Ruffing de seguimiento es donna parte clave de russell tratamiento y seguridad. Asegúrese de hacer y acudir a todas las citas, y llame a russell médico si está teniendo problemas. También es donna buena idea saber los resultados de los exámenes y mantener donna lista de los medicamentos que cyril. Cómo puede cuidarse en el hogar? Cuide russell cuerpo después del parto · Utilice toallas sanitarias en vez de tampones para las pérdidas de franny que podrían durar hasta 2 semanas. · Alivie los cólicos con ibuprofeno (Advil, Motrin). · Alivie el dolor de las hemorroides y la abhijit entre la vagina y el recto con compresas de hielo o de infusión de hamamelis Carlos Do (\"witch hazel\"). · Alivie el estreñimiento bebiendo abundante líquido y comiendo alimentos ricos en fibra. Pregúntele a russell médico acerca de los ablandadores de heces de [employer]. · Límpiese con un chorrito suave de agua tibia de donna botella en vez de hacerlo con papel higiénico. 
· Lopeno un baño de asiento en agua tibia varias veces al día. · Use un buen sostén de lactancia. Alivie el dolor y la hinchazón de los senos con toallitas de aseo húmedas tibias. · Si no está amamantando, utilice hielo en lugar de calor para aliviar el dolor de los senos. · Si está amamantando, russell período menstrual podría no comenzar hasta después de varios meses. Es posible que [place], y más tiempo al principio, de krystin lo hacía antes del Christopher. · Espere hasta que haya sanado (de 4 a 6 semanas) antes de volver a tener relaciones sexuales. Russell médico le dirá cuándo puede tener relaciones sexuales. · Trate de no viajar con el bebé gigi las primeras 5 o 6 semanas. Si hace un viaje valeria en automóvil, richie paradas frecuentes para caminar y estirarse. Evite el agotamiento · Descanse todos los días. Trate de dormir la siesta cuando russell bebé también lo hace. · Pídale a otro adulto que la acompañe por unos renny después del [place]. · Planifique el cuidado de los niños si tiene otros hijos. · Sea flexible para que pueda comer a horas fuera de lo común y dormir cuando lo necesite. Tanto usted krystin russell bebé están creando horarios nuevos. · Planee pequeñas salidas para estar afuera de la casa. El cambio podría hacer que se sienta menos fatigada. · Pida ayuda para cocinar y [address] hogar y las compras. Recuerde que russell principal tarea consiste en cuidar a russell bebé. Conozca la ayuda que puede recibir en curry de tener depresión posparto · La depresión posparto es común gigi las primeras 1 a 2 semanas siguientes al nacimiento. Podría llorar o sentirse dany o irritable sin razón alguna. · Descanse cada vez que pueda hacerlo. Estar fatigada le dificulta manejar destin emociones. · Salga a caminar con russell bebé. · Hable con russell denisse, destin amigos y destin familiares acerca de destin sentimientos. · Si destin síntomas tuttle más de unas pocas semanas, o si se siente muy deprimida, pídale ayuda a russell médico. 
· La depresión posparto puede tratarse. Los grupos de apoyo y la asesoría psicológica pueden ser de [facility]. A veces, los medicamentos también pueden ayudar. Manténgase saludable · Coma alimentos sanos para tener más energía, producir donna buena [place] materna y adelgazar las libras adicionales que engordó con el bebé. · Si amamanta, evite el alcohol y las drogas. No fume. Si dejó de fumar gigi el embarazo, felicitaciones. · Inicie ejercicios diarios después de 4 a 6 semanas, lorena descanse cuando se sienta fatigada. · Aprenda ejercicios para tonificar el abdomen. Elijah los ejercicios de [place] para recuperar la fuerza de los músculos pélvicos. Puede hacer los ejercicios de [place]eg[place] mientras está de pie o sentada. ¨ Apriete los mismos músculos que usted usaría para detener la [place]. El abdomen y los muslos no deben moverse. ¨ Manténgalos apretados gigi 3 segundos y, luego, relájelos otros 3 segundos. ¨ Empiece con 3 segundos.  Joel Pole 1 herminia cada [facility] Lilli sea capaz de apretar gigi 10 segundos. ¨ Repita el ejercicio entre 10 y 13 veces cada sesión. Elijah cathy o más sesiones cada día. · Busque donna clase para nuevas madres y recién nacidos que tenga un tiempo de ejercicio. · Si le harris hecho donna cesárea, dese un poco más de tiempo antes de hacer ejercicio, y tenga cuidado. Cuándo debe pedir ayuda? Llame al 911 en cualquier momento que considere que necesita atención de [place]. Por ejemplo, llame si: 
? · Se desmayó (perdió el conocimiento). ?Llame a russell médico ahora mismo o busque atención médica inmediata si: 
? · Tiene sangrado vaginal intenso. [place] significa que está expulsando coágulos sanguíneos y empapando donna toalla sanitaria cada hora por 2 horas o más. ? · Está mareada o aturdida, o siente krystin que se puede [id number] [facility] 15. ? · Tiene fiebre. ? · Tiene dolor abdominal nuevo o que empeora. ?Preste especial atención a los cambios en russell cristi y asegúrese de comunicarse con russell médico si: 
? · Russell sangrado vaginal parece volverse más intenso. ? · Tiene flujo vaginal nuevo o que empeora. ? · Se siente dany, ansiosa o sin esperanzas gigi más de unos pocos días. ? · No mejora krystin se esperaba. Dónde puede encontrar más información en inglés? Primo Hernandez a http://judah.info/. Cristopher Y102 en la búsqueda para aprender más acerca de \"Después del parto (período de posparto): Instrucciones de cuidado - [ After Your Delivery (the Postpartum Period): Care Instructions ]. \" 
Revisado: 16 marzo, 2017 Versión del contenido: 11.4 © 6484-4067 Healthwise, Incorporated. Las instrucciones de cuidado fueron adaptadas bajo licencia por [employer] Connections (which disclaims liability or warranty for this information). Si usted tiene [place] afección médica o sobre estas instrucciones, siempre pregunte a russell profesional de cristi. Healthwise, Incorporated niega toda garantía o responsabilidad por russell uso de esta información. El período posparto: Instrucciones de cuidado - [ Postpartum: Care Instructions ] Instrucciones de cuidado Después del parto (período posparto), russell cuerpo pasa por muchos cambios. Algunos de estos cambios suceden gigi varias semanas. [address], el cuerpo comienza a recuperarse y se prepara para el amamantamiento. Es posible que [address] se sienta sensible. Las hormonas pueden cambiar russell estado de ánimo sin advertencia y sin motivo aparente. Gigi las primeras [address] después del parto, muchas mujeres tienen emociones que [place] de titus a dany. Es posible que le resulte difícil dormir. Es posible que llore mucho. [place] se llama \"melancolía de la maternidad\". Estas emociones abrumadoras suelen desaparecer dentro de unos días o semanas. [place] es importante hablar con russell médico acerca de destin sentimientos. Gigi las primeras semanas después del [place], es fácil cansarse demasiado o sentirse [place]. No richie demasiados esfuerzos. Descanse cada vez que pueda, acepte la ayuda de los demás, coma emi y kiran abundantes líquidos. Entre 4 y 6 semanas después del nacimiento de russell bebé, tendrá donna consulta de seguimiento con russell médico. Esta visita es russell oportunidad para hablar con russell médico sobre cualquier inquietud o rafael que tenga. La atención de seguimiento es donna parte clave de russell tratamiento y seguridad. Asegúrese de hacer y acudir a todas las citas, y llame a russell médico si está teniendo problemas. También es donna buena idea saber los resultados de los exámenes y mantener donna lista de los medicamentos que cyril. Cómo puede cuidarse en el hogar? · Du[place]a o descanse cuando russell bebé lo richie. · Si es posible, permita que destin familiares o destin amigos la ayuden con las tareas del hogar. No intente hacerlo todo usted jeff. · Si tiene hemorroides o hinchazón o dolor alrededor de la abertura de la vagina, pruebe aplicarse frío y calor.  Puede aplicarse hielo o Nydia Roberts compresa fría en la abhijit gigi 10 a 20 minutos cada vez. Póngase un paño daniel entre el hielo y la piel. Además, trate de sentarse en algunos centímetros de agua tibia (baño de asiento) 3 veces al día y después de las evacuaciones. · [employer] analgésicos (medicamentos para el dolor) exactamente según las indicaciones. ¨ Si el médico le recetó un analgésico, tómelo según las indicaciones. ¨ Si no está tomando un analgésico recetado, pregúntele a russell médico si puede patsy lindsay de [employer]. · Coma más fibra para evitar el estreñimiento. Incluya alimentos krystin panes y cereales integrales, verduras crudas, frutas crudas y secas, y frijoles (habichuelas). · Mary abundantes líquidos, los suficientes krystin para que russell orina sea de color amarillo polo o transparente krystin el agua. Si tiene [employer], del corazón o del hígado y tiene que Rosas's líquidos, hable con russell médico antes de aumentar russell consumo. · No se lave el interior de la vagina con líquidos (lavados vaginales). · Si tiene puntos de sutura, mantenga la abhijit limpia con agua tibia, ya sea echándola o rociándola sobre la abhijit externa de la vagina y el ano después de usar el baño. · Elijah donna lista de preguntas y llévela a russell consulta posparto. Lis preguntas podrían tratar sobre: 
¨ [employer] senos, krystin bultos o sensibilidad. ¨ Cuándo esperar que regrese russell período menstrual. 
¨ Qué forma de anticoncepción es la más adecuada para usted. ¨ El peso que aumentó gigi el Christopher. ¨ Las opciones de ejercicio. ¨ Qué alimentos y bebidas son mejores para usted, sobre todo si está amamantando. ¨ Problemas que podría tener con la lactancia. ¨ Cuándo puede [employer]. Algunas mujeres janna vez quieran hablar sobre lubricantes vaginales. ¨ Cualquier sentimiento de tristeza o inquietud que tenga. Cuándo debe pedir ayuda?  
Llame al 911 en cualquier momento que considere que necesita atención de urgencia. Por ejemplo, llame si: 
? · Tiene pensamientos sobre [place] daño a sí misma, a russell bebé o a otra persona. ? · Se desmayó (perdió el conocimiento). ?Llame a russell médico ahora mismo o busque atención médica inmediata si: 
? · Russell sangrado vaginal parece hacerse más abundante. ? · Se siente mareada o aturdida, o que está a punto de [place]. ? · Tiene fiebre. ?Preste especial atención a los cambios en russell cristi y asegúrese de comunicarse con russell médico si: 
? · Tiene nuevo flujo vaginal o fausto empeora. ? · Se siente dany o deprimida. ? · Tiene problemas con los senos o el amamantamiento. Dónde puede encontrar más información en inglés? Danielle Friedman a [email or web address]-doug.info/. Torsten Her K541 en la búsqueda para aprender más acerca de \"El período posparto: Instrucciones de cuidado - [ Postpartum: Care Instructions ]. \" 
Revisado: 16 marzo, 2017 Versión del contenido: 11.4 © 0199-1139 Healthwise, Incorporated. Las instrucciones de cuidado fueron adaptadas bajo licencia por Good Help Connections (which disclaims liability or warranty for this information). Si usted tiene [address] afección médica o sobre estas instrucciones, siempre pregunte a russell profesional de cristi. Healthwise, Incorporated niega toda garantía o responsabilidad por russell uso de esta información. Introducing [facility]! [facility] introduces [employer] patient portal. Now you can access parts of your medical record, email your doctor's office, and request medication refills online. 1. In your internet browser, go to [email or web address] 2. Click on the First Time User? Click Here link in the Sign In box. You will see the New Member Sign Up page. 3. Enter your [employer] Access Code exactly as it appears below. You will not need to use this code after youve completed the sign-up process. If you do not sign up before the expiration date, you must request a new code. · Peela Access Code: [email or web address] Expires: 7/23/2018  3:32 PM 
 
4. Enter the last four digits of your Social Security Number (xxxx) and Date of Birth (mm/dd/yyyy) as indicated and click Submit. You will be taken to the next sign-up page. 5. Create a Peela ID. This will be your Peela login ID and cannot be changed, so think of one that is secure and easy to remember. 6. Create a Peela password. You can change your password at any time. 7. Enter your Password Reset Question and Answer. This can be used at a later time if you forget your password. 8. Enter your e-mail address. You will receive e-mail notification when new information is available in [address] 9. Click Sign Up. You can now view and download portions of your medical record. 10. Click the Download Summary menu link to download a portable copy of your medical information. If you have questions, please visit the Frequently Asked Questions section of the Peela website. Remember, Peela is NOT to be used for urgent needs. For medical emergencies, dial 911. Now available from your iPhone and Android! Please provide this summary of care documentation to your next provider. Your primary care clinician is listed as Shasta Bowser. If you have any questions after today's visit, please call [phone].

## 2018-06-06 NOTE — PATIENT INSTRUCTIONS
Después del parto (período de posparto): Instrucciones de cuidado - [ After Your Delivery (the Postpartum Period): Care Instructions ]  Instrucciones de cuidado    Felicidades por el nacimiento de russell bebé. Al igual que el Christopher, el tiempo con el recién nacido puede ser un momento de [place], [place] y agotamiento. Es posible que se sienta titus al mirar la saeid de russell pequeño bebé. También podría sentirse abrumada por russell nuevo ritmo de sueño y las nuevas responsabilidades. Al principio, los bebés suelen dormir gigi el día y permanecen despiertos gigi la noche. No tienen ningún patrón ni rutina. Podrían felicia gritos ahogados, sacudirse y despertarse, o parecer krystin si tuvieran los ojos cruzados (bizcos). Todo esto es normal, e incluso la pueden hacer sonreír. Gigi las primeras semanas siguientes al parto, trate de cuidarse emi. Podría tardar de 4 a 6 semanas en volver a sentirse usted misma, y posiblemente más tiempo si le harris hecho donna cesárea. Es probable que se sienta muy fatigada gigi varias semanas. Lis días estarán llenos de Radha, lorena también de mucha alegría. La atención de seguimiento es donna parte clave de russell tratamiento y seguridad. Asegúrese de hacer y acudir a todas las citas, y llame a russell médico si está teniendo problemas. También es donna buena idea saber los resultados de los exámenes y mantener donna lista de los medicamentos que cyril. ¿Cómo puede cuidarse en el hogar? Cuide russell cuerpo después del parto  · Utilice toallas sanitarias en vez de tampones para las pérdidas de franny que podrían durar hasta 2 semanas. · Alivie los cólicos con ibuprofeno (Advil, Motrin). · Alivie el dolor de las hemorroides y la abhijit entre la vagina y el recto con compresas de hielo o de infusión de hamamelis Serge Cools (\"witch hazel\"). · Alivie el estreñimiento bebiendo abundante líquido y comiendo alimentos ricos en fibra.  Pregúntele a russell médico acerca de los ablandadores de heces de [employer]. · Límpiese con un chorrito suave de agua tibia de donna botella en vez de hacerlo con papel higiénico.  · Cayucos un baño de asiento en agua tibia varias veces al día. · Use un buen sostén de lactancia. Alivie el dolor y la hinchazón de los senos con toallitas de aseo húmedas tibias. · Si no está amamantando, utilice hielo en lugar de calor para aliviar el dolor de los senos. · Si está amamantando, russell período menstrual podría no comenzar hasta después de varios meses. Es posible que [place], y más tiempo al principio, de krystin lo hacía antes del [facility]. · Espere hasta que haya sanado (de 4 a 6 semanas) antes de volver a tener relaciones sexuales. Russell médico le dirá cuándo puede tener relaciones sexuales. · Trate de no viajar con el bebé gigi las primeras 5 o 6 semanas. Si hace un viaje valeria en automóvil, richie paradas frecuentes para caminar y estirarse. Evite el agotamiento  · Descanse todos los días. Trate de dormir la siesta cuando russell bebé también lo hace. · Pídale a otro adulto que la acompañe por unos renny después del [place]. · Planifique el cuidado de los niños si tiene otros hijos. · Sea flexible para que pueda comer a horas fuera de lo común y dormir cuando lo necesite. Tanto usted krystin russell bebé están creando horarios nuevos. · Planee pequeñas salidas para estar afuera de la casa. El cambio podría hacer que se sienta menos fatigada. · Pida ayuda para cocinar y [address] [place] hogar y las compras. Recuerde que russell principal tarea consiste en cuidar a russell bebé. Conozca la ayuda que puede recibir en curry de tener depresión posparto  · La depresión posparto es común gigi las primeras 1 a 2 semanas siguientes al nacimiento. Podría llorar o sentirse dany o irritable sin razón alguna. · Descanse cada vez que pueda hacerlo. Estar fatigada le dificulta manejar destin emociones. · Salga a caminar con russell bebé.   · Hable con russell denisse, destin amigos y destin familiares acerca de destin sentimientos. · Si destin síntomas tuttle más de unas pocas semanas, o si se siente muy deprimida, pídale ayuda a russell médico.  · La depresión posparto puede tratarse. Los grupos de apoyo y la asesoría psicológica pueden ser de [facility]. A veces, los medicamentos también pueden ayudar. Manténgase saludable  · Coma alimentos sanos para tener más energía, producir donna buena [place] materna y adelgazar las libras adicionales que engordó con el bebé. · Si amamanta, evite el alcohol y las drogas. No fume. Si dejó de fumar gigi el embarazo, felicitaciones. · Inicie ejercicios diarios después de 4 a 6 semanas, lorena descanse cuando se sienta fatigada. · Aprenda ejercicios para tonificar el abdomen. Elijah los ejercicios de [place] para recuperar la fuerza de los músculos pélvicos. Puede hacer los ejercicios de [place] mientras está de pie o sentada. ¨ Apriete los mismos músculos que usted usaría para detener la [place]. El abdomen y los muslos no deben moverse. ¨ Manténgalos apretados gigi 3 segundos y, luego, relájelos otros 3 segundos. ¨ Empiece con 3 segundos. [place], añada 1 herminia cada semana hasta que sea capaz de apretar gigi 10 segundos. ¨ Repita el ejercicio entre 10 y 13 veces cada sesión. Elijah cathy o más sesiones cada día. · Busque donna clase para nuevas madres y recién nacidos que tenga un tiempo de ejercicio. · Si le harris hecho donna cesárea, dese un poco más de tiempo antes de hacer ejercicio, y tenga cuidado. ¿Cuándo debe pedir ayuda? Llame al 911 en cualquier momento que considere que necesita atención de [place]. Por ejemplo, llame si:  ? · Se desmayó (perdió el conocimiento). ?Llame a russell médico ahora mismo o busque atención médica inmediata si:  ? · Tiene sangrado vaginal intenso. [place] significa que está expulsando coágulos sanguíneos y empapando donna toalla sanitaria cada hora por 2 horas o más. ? · Está mareada o aturdida, o siente krystin que se puede [address] [place] [address] 15. ? · Tiene fiebre.    ? · Tiene dolor abdominal nuevo o que empeora. ?Preste especial atención a los cambios en turner cristi y asegúrese de comunicarse con turner médico si:  ? · Turner sangrado vaginal parece volverse más intenso. ? · Tiene flujo vaginal nuevo o que empeora. ? · Se siente dany, ansiosa o sin esperanzas gigi más de unos pocos días. ? · No mejora krystin se esperaba. ¿Dónde puede encontrar más información en inglés? Karuna Scrivener a [email or web address]/. Henok Chow D040 en la búsqueda para aprender más acerca de \"Después del parto (período de posparto): Instrucciones de cuidado - [ After Your Delivery (the Postpartum Period): Care Instructions ]. \"  Revisado: 16 marzo, 2017  Versión del contenido: 11.4  © 0375-7716 Healthwise, Incorporated. Las instrucciones de cuidado fueron adaptadas bajo licencia por Good [employer] (which disclaims liability or warranty for this information). Si usted tiene [place] afección médica o sobre estas instrucciones, siempre pregunte a turner profesional de cristi. Healthwise, Incorporated niega toda garantía o responsabilidad por turner uso de esta información. El período posparto: Instrucciones de cuidado - [ Postpartum: Care Instructions ]  Instrucciones de cuidado    Después del parto (período posparto), turner cuerpo pasa por muchos cambios. Algunos de estos cambios suceden gigi varias semanas. [address], el cuerpo comienza a recuperarse y se prepara para el amamantamiento. Es posible que [address] se sienta sensible. Las hormonas pueden cambiar turner estado de ánimo sin advertencia y sin motivo aparente. Gigi las primeras [address] después del parto, muchas mujeres tienen emociones que [place] de titus a dany. Es posible que le resulte difícil dormir. Es posible que llore mucho. [place] se llama \"melancolía de la maternidad\". Estas emociones abrumadoras suelen desaparecer dentro de unos días o semanas.  Tripp es importante hablar con turner médico acerca de destin sentimientos. Gigi las primeras semanas después del [place], es fácil cansarse demasiado o sentirse [place]. No richie demasiados esfuerzos. Descanse cada vez que pueda, acepte la ayuda de los demás, coma emi y amry abundantes líquidos. Entre 4 y 6 semanas después del nacimiento de russell bebé, tendrá donna consulta de seguimiento con russell médico. Esta visita es russell oportunidad para hablar con russell médico sobre cualquier inquietud o rafael que tenga. La atención de seguimiento es donna parte clave de russell tratamiento y seguridad. Asegúrese de hacer y acudir a todas las citas, y llame a russell médico si está teniendo problemas. También es donna buena idea saber los resultados de los exámenes y mantener donna lista de los medicamentos que cyril. ¿Cómo puede cuidarse en el hogar? · [place] o descanse cuando russell bebé lo richie. · Si es posible, permita que destin familiares o destin amigos la ayuden con las tareas del hogar. No intente hacerlo todo usted jeff. · Si tiene hemorroides o hinchazón o dolor alrededor de la abertura de la vagina, pruebe aplicarse frío y calor. Puede aplicarse hielo o donna compresa fría en la abhijit gigi 10 a 20 minutos cada vez. Póngase un paño daniel entre el hielo y la piel. Además, trate de sentarse en algunos centímetros de agua tibia (baño de asiento) 3 veces al día y después de las evacuaciones. · [employer] analgésicos (medicamentos para el dolor) exactamente según las indicaciones. ¨ Si el médico le recetó un analgésico, tómelo según las indicaciones. ¨ Si no está tomando un analgésico recetado, pregúntele a russell médico si puede patsy lindsay de [employer]. · Coma más fibra para evitar el estreñimiento. Incluya alimentos krystin panes y cereales integrales, verduras crudas, frutas crudas y secas, y frijoles (habichuelas). · Mary abundantes líquidos, los suficientes krystin para que russell orina sea de color amarillo polo o transparente krystin el agua.  Si tiene [facility], del corgelacio o del hígado y tiene que [place]'s líquidos, hable con russell médico antes de aumentar russell consumo. · No se lave el interior de la vagina con líquidos (lavados vaginales). · Si tiene puntos de sutura, mantenga la abhijit limpia con agua tibia, ya sea echándola o rociándola sobre la abhijit externa de la vagina y el ano después de usar el baño. · Elijah donna lista de preguntas y llévela a russell consulta posparto. Lis preguntas podrían tratar sobre:  ¨ [employer] senos, krystin bultos o sensibilidad. ¨ Cuándo esperar que regrese russell período menstrual.  ¨ Qué forma de anticoncepción es la más adecuada para usted. ¨ El peso que aumentó gigi el [employer]. ¨ Las opciones de ejercicio. ¨ Qué alimentos y bebidas son mejores para usted, sobre todo si está amamantando. ¨ Problemas que podría tener con la lactancia. ¨ Cuándo puede [employer]. Algunas mujeres janna vez quieran hablar sobre lubricantes vaginales. ¨ Cualquier sentimiento de tristeza o inquietud que tenga. ¿Cuándo debe pedir ayuda? Llame al 911 en cualquier momento que considere que necesita atención de [place]. Por ejemplo, llame si:  ? · Tiene pensamientos sobre [place] daño a sí misma, a russell bebé o a otra persona. ? · Se desmayó (perdió el conocimiento). ?Llame a russell médico ahora mismo o busque atención médica inmediata si:  ? · Russell sangrado vaginal parece hacerse más abundante. ? · Se siente mareada o aturdida, o que está a punto de [place]. ? · Tiene fiebre. ?Preste especial atención a los cambios en russell cristi y asegúrese de comunicarse con russell médico si:  ? · Tiene nuevo flujo vaginal o fausto empeora. ? · Se siente dany o deprimida. ? · Tiene problemas con los senos o el amamantamiento. ¿Dónde puede encontrar más información en inglés? Nela Lipoma a [email or web address] Caroline Servant V899 en la búsqueda para aprender más acerca de \"El período posparto:  Instrucciones de cuidado - [ Postpartum: Care Instructions ].\"  Revisado: [address] [place] [address], 2017  Versión del contenido: 11.4  © 4052-7924 Healthwise, Incorporated. Las instrucciones de cuidado fueron adaptadas bajo licencia por [employer] (which disclaims liability or warranty for this information). Si usted tiene [place] afección médica o sobre estas instrucciones, siempre pregunte a russell profesional de cristi. Healthwise, Incorporated niega toda garantía o responsabilidad por russell uso de esta información.

## 2018-06-06 NOTE — PROGRESS NOTES
Chief Complaint   Patient presents with    Blood Pressure Check     1. Have you been to the ER, urgent care clinic since your last visit? Hospitalized since your last visit? No    2. Have you seen or consulted any other health care providers outside of the [address] [place] [address] since your last visit? Include any pap smears or colon screening.  No

## 2018-06-07 ENCOUNTER — TELEPHONE (OUTPATIENT)
Dept: FAMILY MEDICINE CLINIC | Age: 23
End: 2018-06-07

## 2018-06-07 LAB
CREAT UR-MCNC: 56.3 MG/DL
PROT UR-MCNC: 25.9 MG/DL
PROT/CREAT UR: 460 MG/G CREAT (ref 0–200)

## 2018-06-07 NOTE — TELEPHONE ENCOUNTER
Discussed the case with Dr. Laura Davalos ( the attending physician). The patient is 1 week postpartum who developed hypertension in the early postpartum period. BP was at goal at last visit. I called the patient at [phone] to discuss the test results. The patient was identified by 2 identifiers. Discussed results. CMP WNL, Protein/creatine ratio is elevated( as expected in postpartum period). The patient will follow up in 2 weeks for BP recheck.  IF BP  Still at goal, will consider stopping the medication ( Procardia XL)      4:22 PM  6/7/2018  Cristhian Tapia MD

## 2018-06-08 ENCOUNTER — DOCUMENTATION ONLY (OUTPATIENT)
Dept: FAMILY MEDICINE CLINIC | Age: 23
End: 2018-06-08

## 2018-06-22 ENCOUNTER — OFFICE VISIT (OUTPATIENT)
Dept: FAMILY MEDICINE CLINIC | Age: 23
End: 2018-06-22

## 2018-06-22 VITALS
HEART RATE: 58 BPM | HEIGHT: 56 IN | OXYGEN SATURATION: 98 % | WEIGHT: 102 LBS | RESPIRATION RATE: 17 BRPM | SYSTOLIC BLOOD PRESSURE: 116 MMHG | TEMPERATURE: 98.2 F | DIASTOLIC BLOOD PRESSURE: 79 MMHG | BODY MASS INDEX: 22.95 KG/M2

## 2018-06-22 NOTE — PROGRESS NOTES
Subjective:   Jannet Joshi is an 23 y.o. female who presents for postpartum visit and follow up visit on blood pressure. HPI  She is  s/p  after IOL at 36w2d for IUGR and oligohydramnios. Delivery was uncomplicated. During the hospital stay the patient's BPs were noted to be elevated. She was asymptomatic. She was put on Procardia 30 mg daily. She has been compliant with the medication, no side effects. No leg swelling, no blurry vision, no chest pain, no SOB. She is still breastfeeding. Since the discharge the patient was taking her Procardia every day. She was checking her BP intermittently and the readings are with SBP in 110s. Allergies - reviewed: Allergies   Allergen Reactions    Penicillins Hives         Medications - reviewed:  Current Outpatient Prescriptions   Medication Sig    ibuprofen (MOTRIN) 800 mg tablet Take 1 Tab by mouth every eight (8) hours as needed for Pain.  prenatal vit-calcium-iron-fa (PRENATAL PLUS WITH CALCIUM) 27 mg iron- 1 mg tab Take 1 Tab by mouth daily. No current facility-administered medications for this visit. Past Medical History - reviewed:  History reviewed. No pertinent past medical history. Past Surgical History - reviewed:  History reviewed. No pertinent surgical history. Family History - reviewed:  History reviewed. No pertinent family history. Social History - reviewed:  Social History     Social History    Marital status: SINGLE     Spouse name: N/A    Number of children: N/A    Years of education: N/A     Occupational History    Not on file. Social History Main Topics    Smoking status: Never Smoker    Smokeless tobacco: Never Used    Alcohol use No    Drug use: No    Sexual activity: Yes     Other Topics Concern    Not on file     Social History Narrative         Review of Systems   CONSTITUTIONAL: denies fever. Denies chills. CARDIOVASCULAR: denies chest pain.  Denies palpitations  RESPIRATORY: denies shortness of breath  : denies vaginal discharge, vaginal bleeding  SKIN: denies rash. Denies easy bruising        Objective:     Visit Vitals    /79    Pulse (!) 58    Temp 98.2 °F (36.8 °C) (Oral)    Resp 17    Ht 4' 8\" (1.422 m)    Wt 102 lb (46.3 kg)    LMP 09/20/2017    SpO2 98%    BMI 22.87 kg/m2       General appearance - alert, well appearing, and in no distress  Eyes - pupils equal and reactive, extraocular eye movements intact  Heart - normal rate, regular rhythm, normal S1, S2, no murmurs, rubs, clicks or gallops  Abdomen - soft, nontender, nondistended, no masses or organomegaly  Neurological - alert, oriented, normal speech, no focal findings or movement disorder noted  Extremities - peripheral pulses normal, no pedal edema, no clubbing or cyanosis  Skin - normal coloration and turgor, no rashes, no suspicious skin lesions noted      Assessment:   24 yo female who is here for:     ICD-10-CM ICD-9-CM    1. Encounter for postpartum visit Z39.2 V24.2    2. Pregnancy induced hypertension, postpartum O13.5 642.34        Plan:   · Pregnancy induced hypertension- resolved. BP is good. Asymptomatic. · Recommended to stop Procardia and monitor BP. ( The case previously discussed with Dr. Laura Davalos [facility] attending physician)    Follow-up Disposition:  Return if symptoms worsen or fail to improve. I have discussed the diagnosis with the patient and the intended plan as seen in the above orders. The patient has received an after-visit summary and questions were answered concerning future plans. I have discussed medication side effects and warnings with the patient as well. Informed pt to return to the office if new symptoms arise.       Cristhian Tapia MD  Family Medicine Resident, PGY-2

## 2018-06-22 NOTE — MR AVS SNAPSHOT
[address] 
[phone] Patient: Carlitos Maldonado MRN: [id number] :1995 Visit Information Date & Time Provider Department Dept. Phone Encounter #  
 2018 10:15 AM Daryl Escobar MD [address] [facility] [phone] [id number] Upcoming Health Maintenance Date Due Hepatitis A Peds Age 1-18 (1 of 2 - Standard Series) 1998 HPV Age 9Y-34Y (1 of 3 - Female 3 Dose Series) 2008 DTaP/Tdap/Td series (2 - Td) 2018 Influenza Age 5 to Adult 2018 Allergies as of 2018  Review Complete On: 2018 By: Dejan Ballard LPN Severity Noted Reaction Type Reactions Penicillins  01/15/2018    Hives Current Immunizations  Reviewed on 6/3/2018 Name Date Influenza Vaccine (Quad) PF 1/15/2018 Tdap 3/27/2018 Not reviewed this visit You Were Diagnosed With   
  
 Codes Comments Encounter for postpartum visit    -  Primary ICD-10-CM: Z39.2 ICD-9-CM: V24.2 Vitals BP Pulse Temp Resp Height(growth percentile) Weight(growth percentile) 116/79 (!) 58 98.2 °F (36.8 °C) (Oral) 17 4' 8\" (1.422 m) 102 lb (46.3 kg) LMP SpO2 BMI OB Status Smoking Status 2017 98% 22.87 kg/m2 Recent pregnancy Never Smoker Vitals History BMI and BSA Data Body Mass Index Body Surface Area  
 22.87 kg/m 2 1.35 m 2 Preferred Pharmacy Pharmacy Name Phone [id number] [place], [address] [facility] [phone] Your Updated Medication List  
  
   
This list is accurate as of 18 10:16 AM.  Always use your most recent med list.  
  
  
  
  
 ibuprofen 800 mg tablet Commonly known as:  MOTRIN Take 1 Tab by mouth every eight (8) hours as needed for Pain. NIFEdipine ER 30 mg ER tablet Commonly known as:  PROCARDIA XL  
 Take 1 Tab by mouth daily. prenatal vit-calcium-iron-fa 27 mg iron- 1 mg Tab Commonly known as:  PRENATAL PLUS with CALCIUM Take 1 Tab by mouth daily. Introducing [facility]! Lily Sandoval introduces Ciklum patient portal. Now you can access parts of your medical record, email your doctor's office, and request medication refills online. 1. In your internet browser, go to [email or web address] 2. Click on the First Time User? Click Here link in the Sign In box. You will see the New Member Sign Up page. 3. Enter your Ciklum Access Code exactly as it appears below. You will not need to use this code after youve completed the sign-up process. If you do not sign up before the expiration date, you must request a new code. · Ciklum Access Code: [email or web address] Expires: 7/23/2018  3:32 PM 
 
4. Enter the last four digits of your Social Security Number (xxxx) and Date of Birth (mm/dd/yyyy) as indicated and click Submit. You will be taken to the next sign-up page. 5. Create a Ciklum ID. This will be your Ciklum login ID and cannot be changed, so think of one that is secure and easy to remember. 6. Create a Ciklum password. You can change your password at any time. 7. Enter your Password Reset Question and Answer. This can be used at a later time if you forget your password. 8. Enter your e-mail address. You will receive e-mail notification when new information is available in [address] 9. Click Sign Up. You can now view and download portions of your medical record. 10. Click the Download Summary menu link to download a portable copy of your medical information. If you have questions, please visit the Frequently Asked Questions section of the Ciklum website. Remember, Ciklum is NOT to be used for urgent needs. For medical emergencies, dial 911. Now available from your iPhone and Android! Please provide this summary of care documentation to your next provider. Your primary care clinician is listed as Leeian Waggoner. If you have any questions after today's visit, please call [phone].

## 2018-07-05 ENCOUNTER — TELEPHONE (OUTPATIENT)
Dept: FAMILY MEDICINE CLINIC | Age: 23
End: 2018-07-05

## 2018-07-05 NOTE — TELEPHONE ENCOUNTER
Patient called and notes she delivered 6/1/18 and feels as if her stitches may have come out. Patient notes vaginal bleeding.     Dr. Urbina Speaker assisted patient with her concerns

## 2018-07-12 ENCOUNTER — ROUTINE PRENATAL (OUTPATIENT)
Dept: FAMILY MEDICINE CLINIC | Age: 23
End: 2018-07-12

## 2018-07-12 VITALS
BODY MASS INDEX: 22.5 KG/M2 | HEIGHT: 56 IN | RESPIRATION RATE: 18 BRPM | DIASTOLIC BLOOD PRESSURE: 65 MMHG | TEMPERATURE: 98.1 F | SYSTOLIC BLOOD PRESSURE: 111 MMHG | HEART RATE: 71 BPM | OXYGEN SATURATION: 99 % | WEIGHT: 100 LBS

## 2018-07-12 VITALS
HEIGHT: 56 IN | HEART RATE: 71 BPM | RESPIRATION RATE: 18 BRPM | TEMPERATURE: 98.1 F | WEIGHT: 100 LBS | SYSTOLIC BLOOD PRESSURE: 111 MMHG | DIASTOLIC BLOOD PRESSURE: 65 MMHG | OXYGEN SATURATION: 99 % | BODY MASS INDEX: 22.5 KG/M2

## 2018-07-12 DIAGNOSIS — N94.6 DYSMENORRHEA: Primary | ICD-10-CM

## 2018-07-12 DIAGNOSIS — R10.2 VAGINAL PAIN: ICD-10-CM

## 2018-07-12 PROBLEM — Z34.00 SUPERVISION OF NORMAL FIRST PREGNANCY, ANTEPARTUM: Status: RESOLVED | Noted: 2018-01-30 | Resolved: 2018-07-12

## 2018-07-12 PROBLEM — Z87.59 HISTORY OF PRIOR PREGNANCY WITH IUGR NEWBORN: Status: ACTIVE | Noted: 2018-05-31

## 2018-07-12 PROBLEM — Z34.90 PREGNANCY: Status: RESOLVED | Noted: 2018-05-31 | Resolved: 2018-07-12

## 2018-07-12 RX ORDER — MEDROXYPROGESTERONE ACETATE 150 MG/ML
150 INJECTION, SUSPENSION INTRAMUSCULAR ONCE
Qty: 1 SYRINGE | Refills: 1
Start: 2018-07-12 | End: 2018-07-12

## 2018-07-12 NOTE — PATIENT INSTRUCTIONS
El implante krystin método anticonceptivo: Instrucciones de cuidado - [ Implant for Birth Control: Care Instructions ]  Instrucciones de cuidado    El implante se [place] para prevenir el embarazo. Es donna varilla delgada del tamaño de un cerillo (fósforo) que se inserta bajo la piel (subdérmica) en la parte interna del brazo. El implante previene el embarazo gigi 3 años. Después de que se coloca, no tiene que hacer [address] para prevenir el embarazo. La atención de seguimiento es donna parte clave de russell tratamiento y seguridad. Asegúrese de hacer y acudir a todas las citas, y llame a russell médico si está teniendo problemas. También es donna buena idea saber los resultados de destin exámenes y mantener donna lista de los medicamentos que cyril. ¿Cómo puede cuidarse en el hogar? ¿Cómo se [place] el implante subdérmico?  · El implante es insertado y retirado por russell médico u otro profesional de la cristi capacitado. [place] se hace en el consultorio de russell [address] solo unos minutos. · Pregúntele a russell médico si necesita usar un método anticonceptivo de respaldo, krystin un condón. Y pregunte si (y por cuánto tiempo) debiera evitar tener relaciones sexuales después de la colocación del implante. Es posible que tenga que [place], dependiendo del momento de russell ciclo en que le pongan el implante. ¿Qué más necesita saber? · El implante tiene efectos secundarios. ¨ Es posible que tenga cambios en russell período. Russell período podría interrumpirse. También es posible que tenga manchado o sangrado entre períodos. ¨ Podría tener Perfecto Luciano de ánimo, menos interés en el sexo o aumento de [place]da[place]. · Recuerde que es necesario que después de 3 años de la colocación del implante, se lo retiren o que le pongan lindsay nuevo. ¨ Si no reemplaza el implante y no [address] anticonceptivo, Deirdre. ¨ Si le retiran el implante, tendrá que encontrar otro método anticonceptivo. Si no lo hace, podría quedar embarazada.   ¨ Aun si está usted planeando quedar embarazada, tienen que retirarle el implante. · Consulte con russell médico antes de usar cualquier otro medicamento. [place] incluye medicamentos de venta charles, vitaminas, productos herbarios y suplementos. Las hormonas anticonceptivas podrían no funcionar tan emi para prevenir el embarazo cuando se combinan con otros medicamentos. · El implante no protege contra las infecciones de transmisión sexual (STI, por destin siglas en inglés), krystin el herpes o el VIH/SIDA. Si no está baldwin de si russell denisse sexual pudiera tener donna STI, utilice un condón para protegerse de [place] infección. ¿Cuándo debe pedir ayuda? Llame a russell médico ahora mismo o busque atención médica inmediata si:    · Tiene dolor abdominal intenso.    Preste especial atención a los cambios en russell cristi y asegúrese de comunicarse con russell médico si:    · Piensa que podría estar embarazada.     · Tiene problemas con russell método anticonceptivo.     · Piensa que puede estar deprimida.     · Tiene manchado con regularidad.     · Ashely que puede paige estado expuesta a donna infección de transmisión sexual o ya tiene Jairo Pain. ¿Dónde puede encontrar más información en inglés? Bronwyn Abbasi a http://radha-doug.info/. Rolf Daigle D777 en la búsqueda para aprender más acerca de \"El implante krystin método anticonceptivo: Instrucciones de cuidado - [ Implant for Birth Control: Care Instructions ]. \"  Revisado: 21 noviembre, 2017  Versión del contenido: 11.7  © 4926-3664 [employer]. Las instrucciones de cuidado fueron adaptadas bajo licencia por [employer] (which disclaims liability or warranty for this information). Si usted tiene [place] afección médica o sobre estas instrucciones, siempre pregunte a rusesll profesional de cristi. [facility] niega toda garantía o responsabilidad por russell uso de esta información.

## 2018-07-12 NOTE — PROGRESS NOTES
POST PARTUM NOTE    Subjective:   Ms. Layne Choi is a 23 y.o.   who is now 6 weeks postpartum. C/o pain and bleeding from vagina. Worried she felt the sutures from her laceration repair pop. No bleeding today. Saw what she thinks was pieces of black string. Method of delivery: normal spontaneous vaginal delivery  Laceration:  1st degree and periurethral     Pregnancy complications: none and IUGR and oligohydramnios requiring pre-term IOL. Subsequently developed GHTN requiring oral antihypertensive therapy; she has since been weaned off of meds. She is breast-feeding and formula feeding and is not experiencing problems. She is feeling well and happy. [place]  Depression Scale = 2    She has not resumed sexual activity. She currently uses no method for contraception. She plans to use Depo-Provera for contraception but ultimately wants the Nexplanon for maintenance       Objective:     Visit Vitals    /65 (BP 1 Location: Right arm, BP Patient Position: Sitting)    Pulse 71    Temp 98.1 °F (36.7 °C) (Oral)    Resp 18    Ht 4' 8\" (1.422 m)    Wt 100 lb (45.4 kg)    LMP 2017    SpO2 99%    BMI 22.42 kg/m2       Physical Exam:  Date of last Pap smear: Not indicated until 25 yo  Physical Exam: GENERAL APPEARANCE: alert, well appearing, in no apparent distress  EXTERNAL GENITALIA POSTPARTUM: normal, well-healed, without lesions or masses  VAGINA POSTPARTUM: normal, well-healed, physiologic discharge, without lesions    Assessment/Plan:   Normal post partum exam  BPs stable, normal pelvic exam  Depo ordered; RTC for administration   Follow up when 23 for Well Woman WITH pap      ICD-10-CM ICD-9-CM    1. Dysmenorrhea N94.6 625.3 medroxyPROGESTERone (DEPO-PROVERA) 150 mg/mL syrg   2. Vaginal pain R10.2 625.9    3.  Encounter for postpartum visit Z39.2 V24.2          Hilario Schwab, MD

## 2018-07-12 NOTE — PROGRESS NOTES
Identified Patient with two Patient identifiers (Name and ). Two Patient Identifiers confirmed. Reviewed record in preparation for visit and have obtained necessary documentation. Chief Complaint   Patient presents with   [address]       Visit Vitals    /65    Pulse 71    Temp 98.1 °F (36.7 °C) (Oral)    Resp 18    Ht 4' 8\" (1.422 m)    Wt 100 lb (45.4 kg)    SpO2 99%    BMI 22.42 kg/m2       1. Have you been to the ER, urgent care clinic since your last visit? Hospitalized since your last visit? No    2. Have you seen or consulted any other health care providers outside of the [address] [place] [address] since your last visit? Include any pap smears or colon screening. No    Patient and baby are doing well. Patient complaining of stitches coming out of where she tore during delivery. She would like the doctor to look at the site to make sure it is healing properly. Blood pressure normal in office today.

## 2018-07-12 NOTE — MR AVS SNAPSHOT
[address] [phone] Patient: Zonia Arciniega MRN: [id number] :1995 Visit Information Johnson Peabody y [place] Personal Médico Departamento Teléfono del Dep. Número de visita 2018  1:35 PM John Subramanian, [address] [place] [address] [phone] Upcoming Health Maintenance Date Due Hepatitis A Peds Age 1-18 (1 of 2 - Standard Series) 1998 HPV Age 9Y-34Y (1 of 3 - Female 3 Dose Series) 2008 DTaP/Tdap/Td series (2 - Td) 2018 Influenza Age 5 to Adult 2018 [place]  Review Complete El: 2018 Por: Brendan Burton LPN A partir del:  2018 Intensidad Anotado Tipo de reacción [employer] Penicillins  01/15/2018    Hives Vacunas actuales Revisadas el:  6/3/2018 [facility] Influenza Vaccine (Quad) PF 1/15/2018 Tdap 3/27/2018 No revisadas esta visita You Were Diagnosed With   
  
 Rinda Goldendale Dysmenorrhea    -  Primary ICD-10-CM: N94.6 ICD-9-CM: 625.3 Partes vitales PS Pulso Temperatura Resp [place] ( percentil de crecimiento) Peso (percentil de crecimiento) 111/65 (BP 1 Location: Right arm, BP Patient Position: Sitting) 71 98.1 °F (36.7 °C) (Oral) 18 4' 8\" (1.422 m) 100 lb (45.4 kg) LMP (última chadwick) SpO2 BMI (IMC) Estado obstétrico Estatus de tabaquísmo 2017 99% 22.42 kg/m2 Recent pregnancy Never Smoker BMI and BSA Data Body Mass Index Body Surface Area  
 22.42 kg/m 2 1.34 m 2 Preferred Pharmacy Pharmacy Name Phone [id number] [phone] [place] lista de medicamentos actualizada Lista actualizada 18  1:40 PM.  Siempre use turner lista de medicamentos más reciente. ibuprofen 800 mg tablet También conocido krystin:  MOTRIN  
 Take 1 Tab by mouth every eight (8) hours as needed for Pain.  
  
 medroxyPROGESTERone 150 mg/mL Syrg También conocido krystin:  DEPO-PROVERA  
1 mL by IntraMUSCular route once for 1 dose. prenatal vit-calcium-iron-fa 27 mg iron- 1 mg Tab También conocido krystin:  PRENATAL PLUS with CALCIUM Take 1 Tab by mouth daily. Instrucciones para el Paciente El implante krystin método anticonceptivo: Instrucciones de cuidado - [ Implant for Birth Control: Care Instructions ] Instrucciones de cuidado El implante se [place] para prevenir el Mesha Roper. Es donna varilla delgada del tamaño de un cerillo (fósforo) que se inserta bajo la piel (subdérmica) en la parte interna del brazo. El implante previene el embarazo gigi 5 años. Después de que se coloca, no tiene que hacer [address] para prevenir el embarazo. La atención de seguimiento es donna parte clave de russell tratamiento y seguridad. Asegúrese de hacer y acudir a todas las citas, y llame a russell médico si está teniendo problemas. También es donna buena idea saber los resultados de destin exámenes y mantener donna lista de los medicamentos que cyril. Cómo puede cuidarse en el hogar? Cómo se [place] el implante subdérmico? 
· El implante es insertado y retirado por russell médico u otro profesional de la cristi capacitado. [place] se hace en el consultorio de russell [address] solo unos minutos. · Pregúntele a russell médico si necesita usar un método anticonceptivo de respaldo, krystin un condón. Y pregunte si (y por cuánto tiempo) debiera evitar tener relaciones sexuales después de la colocación del implante. Es posible que tenga que [place], dependiendo del momento de russell ciclo en que le pongan el implante. [place] Kulkarnier necesita saber? · El implante tiene efectos secundarios. ¨ Es posible que tenga cambios en russell período. Russell período podría interrumpirse. También es posible que tenga manchado o sangrado entre períodos. ¨ Podría tener Perfecto Luciano de ánimo, menos interés en el sexo o aumento de Remersdaal. · Recuerde que es necesario que después de 3 años de la colocación del implante, se lo retiren o que le pongan lindsay nuevo. ¨ Si no reemplaza el implante y no [address] anticonceptivo, [place]. ¨ Si le retiran el implante, tendrá que encontrar otro método anticonceptivo. Si no lo hace, podría quedar embarazada. ¨ Aun si está usted planeando quedar embarazada, tienen que retirarle el implante. · Consulte con russell médico antes de usar cualquier otro medicamento. [place] incluye medicamentos de venta charles, vitaminas, productos herbarios y suplementos. Las hormonas anticonceptivas podrían no funcionar tan emi para prevenir el embarazo cuando se combinan con otros medicamentos. · El implante no protege contra las infecciones de transmisión sexual (STI, por destin siglas en inglés), krystin el herpes o el VIH/SIDA. Si no está baldwin de si russell denisse sexual pudiera tener donna STI, utilice un condón para protegerse de [place] infección. Cuándo debe pedir ayuda? Llame a russell médico ahora mismo o busque atención médica inmediata si: 
  · Tiene dolor abdominal intenso.  
 Preste especial atención a los cambios en russell cristi y asegúrese de comunicarse con russell médico si: 
  · Piensa que podría estar embarazada.  
  · Tiene problemas con russell método anticonceptivo.  
  · Piensa que puede estar deprimida.  
  · Tiene manchado con regularidad.  
  · Ashely que puede paige estado expuesta a donna infección de transmisión sexual o ya tiene Torrey Acharya. Dónde puede encontrar más información en inglés? Erika Lucas a http://radha-doug.info/. Elisabeth Denis K199 en la búsqueda para aprender más acerca de \"El implante krystin método anticonceptivo: Instrucciones de cuidado - [ Implant for Birth Control: Care Instructions ]. \" 
Revisado: 21 noviembre, 2017 Versión del contenido: 11.7 © 0806-9041 Healthwise, Incorporated. Las instrucciones de cuidado fueron adaptadas bajo licencia por [employer] (which disclaims liability or warranty for this information). Si usted tiene [place] afección médica o sobre estas instrucciones, siempre pregunte a russell profesional de cristi. Healthwise, Incorporated niega toda garantía o responsabilidad por russell uso de esta información. Introducing [facility]! Stevie Silva introduces Bambuser patient portal. Now you can access parts of your medical record, email your doctor's office, and request medication refills online. 1. In your internet browser, go to [email or web address] 2. Click on the First Time User? Click Here link in the Sign In box. You will see the New Member Sign Up page. 3. Enter your Bambuser Access Code exactly as it appears below. You will not need to use this code after youve completed the sign-up process. If you do not sign up before the expiration date, you must request a new code. · Bambuser Access Code: [email or web address] Expires: 7/23/2018  3:32 PM 
 
4. Enter the last four digits of your Social Security Number (xxxx) and Date of Birth (mm/dd/yyyy) as indicated and click Submit. You will be taken to the next sign-up page. 5. Create a Bambuser ID. This will be your Bambuser login ID and cannot be changed, so think of one that is secure and easy to remember. 6. Create a Bambuser password. You can change your password at any time. 7. Enter your Password Reset Question and Answer. This can be used at a later time if you forget your password. 8. Enter your e-mail address. You will receive e-mail notification when new information is available in [address] 9. Click Sign Up. You can now view and download portions of your medical record. 10. Click the Download Summary menu link to download a portable copy of your medical information. If you have questions, please visit the Frequently Asked Questions section of the [facility] website. Remember, Otogami is NOT to be used for urgent needs. For medical emergencies, dial 911. Now available from your iPhone and Android! Please provide this summary of care documentation to your next provider. Your primary care clinician is listed as Jose Keller. If you have any questions after today's visit, please call [phone].

## 2018-07-12 NOTE — PATIENT INSTRUCTIONS
El implante krystin método anticonceptivo: Instrucciones de cuidado - [ Implant for Birth Control: Care Instructions ]  Instrucciones de cuidado    El implante se [place] para prevenir el embarazo. Es donna varilla delgada del tamaño de un cerillo (fósforo) que se inserta bajo la piel (subdérmica) en la parte interna del brazo. El implante previene el embarazo gigi 5 años. Después de que se coloca, no tiene que hacer [address] para prevenir el embarazo. La atención de seguimiento es donna parte clave de russell tratamiento y seguridad. Asegúrese de hacer y acudir a todas las citas, y llame a russell médico si está teniendo problemas. También es donna buena idea saber los resultados de destin exámenes y mantener donna lista de los medicamentos que cyril. ¿Cómo puede cuidarse en el hogar? ¿Cómo se [place] el implante subdérmico?  · El implante es insertado y retirado por russell médico u otro profesional de la cristi capacitado. [place] se hace en el consultorio de russell [address] solo unos minutos. · Pregúntele a russell médico si necesita usar un método anticonceptivo de respaldo, krystin un condón. Y pregunte si (y por cuánto tiempo) debiera evitar tener relaciones sexuales después de la colocación del implante. Es posible que tenga que [place], dependiendo del momento de russell ciclo en que le pongan el implante. ¿Qué más necesita saber? · El implante tiene efectos secundarios. ¨ Es posible que tenga cambios en russell período. Russell período podría interrumpirse. También es posible que tenga manchado o sangrado entre períodos. ¨ Podría tener Perfecto Luciano de ánimo, menos interés en el sexo o aumento de Remersdaal. · Recuerde que es necesario que después de 3 años de la colocación del implante, se lo retiren o que le pongan lindsay nuevo. ¨ Si no reemplaza el implante y no [address] anticonceptivo, Deirdre. ¨ Si le retiran el implante, tendrá que encontrar otro método anticonceptivo. Si no lo hace, podría quedar embarazada.   ¨ Aun si está usted planeando quedar embarazada, tienen que retirarle el implante. · Consulte con russell médico antes de usar cualquier otro medicamento. [place] incluye medicamentos de venta charles, vitaminas, productos herbarios y suplementos. Las hormonas anticonceptivas podrían no funcionar tan emi para prevenir el embarazo cuando se combinan con otros medicamentos. · El implante no protege contra las infecciones de transmisión sexual (STI, por destin siglas en [facility]), krystin el herpes o el VIH/SIDA. Si no está baldwin de si russell denisse sexual pudiera tener donna STI, utilice un condón para protegerse de [place] infección. ¿Cuándo debe pedir ayuda? Llame a russell médico ahora mismo o busque atención médica inmediata si:    · Tiene dolor abdominal intenso.    Preste especial atención a los cambios en russell cristi y asegúrese de comunicarse con russell médico si:    · Piensa que podría estar embarazada.     · Tiene problemas con russell método anticonceptivo.     · Piensa que puede estar deprimida.     · Tiene manchado con regularidad.     · Ashely que puede paige estado expuesta a donna infección de transmisión sexual o ya tiene [employer]. ¿Dónde puede encontrar más información en [facility]? Mirza Baez a http://radha-doug.info/. Fredy Valdez J528 en la búsqueda para aprender más acerca de \"El implante krystin método anticonceptivo: Instrucciones de cuidado - [ Implant for Birth Control: Care Instructions ]. \"  Revisado: 21 noviembre, 2017  Versión del contenido: 11.7  © 0699-5503 [employer], Incorporated. Las instrucciones de cuidado fueron adaptadas bajo licencia por [employer] (which disclaims liability or warranty for this information). Si usted tiene [place] afección médica o sobre estas instrucciones, siempre pregunte a russell profesional de cristi. [facility] niega toda garantía o responsabilidad por russell uso de esta información.

## 2018-07-18 NOTE — TELEPHONE ENCOUNTER
Patient needs a refill of the following  Requested Prescriptions     Pending Prescriptions Disp Refills    NIFEdipine ER (PROCARDIA XL) 30 mg ER tablet 30 Tab 0     Sig: Take 1 Tab by mouth daily.

## 2018-07-23 RX ORDER — NIFEDIPINE 30 MG/1
30 TABLET, EXTENDED RELEASE ORAL DAILY
Qty: 30 TAB | Refills: 0 | OUTPATIENT
Start: 2018-07-23

## 2018-08-02 ENCOUNTER — TELEPHONE (OUTPATIENT)
Dept: FAMILY MEDICINE CLINIC | Age: 23
End: 2018-08-02

## 2018-08-02 NOTE — TELEPHONE ENCOUNTER
----- Message from [employer] sent at 8/2/2018  3:06 PM EDT -----  Regarding: Dr. Erin Theodore  Patient is requesting a back to work note after having a baby. Her number is [phone].

## 2018-08-03 ENCOUNTER — TELEPHONE (OUTPATIENT)
Dept: FAMILY MEDICINE CLINIC | Age: 23
End: 2018-08-03

## 2018-08-03 NOTE — TELEPHONE ENCOUNTER
Verified paitent by 2 identifiers. Asked patient for clarification of what her previous phone call was into the office per Dr Lesvia Nicholas. Patient states she needs a letter stating that she can return back to work by Monday. Informed patient I will resend the message to Dr Lesvia Nicholas. Patient verbalized understanding.

## 2020-06-23 NOTE — PROGRESS NOTES
I reviewed with the resident the medical history and the resident's findings on the physical examination. I discussed with the resident the patient's diagnosis and concur with the plan. Please call to complete PHQ-9. Pt not using my chart.  Maddison Bowers RN

## 2021-11-23 ENCOUNTER — HOSPITAL ENCOUNTER (OUTPATIENT)
Dept: LAB | Age: 26
Discharge: HOME | End: 2021-11-23

## 2021-11-23 PROCEDURE — 87491 CHLMYD TRACH DNA AMP PROBE: CPT

## 2021-11-23 PROCEDURE — 88175 CYTOPATH C/V AUTO FLUID REDO: CPT

## 2021-11-26 LAB
C TRACH RRNA SPEC QL NAA+PROBE: POSITIVE
N GONORRHOEA RRNA SPEC QL NAA+PROBE: NEGATIVE
SPECIMEN SOURCE: ABNORMAL

## 2021-12-09 ENCOUNTER — HOSPITAL ENCOUNTER (OUTPATIENT)
Dept: LAB | Age: 26
Discharge: HOME | End: 2021-12-09

## 2021-12-09 PROCEDURE — 84443 ASSAY THYROID STIM HORMONE: CPT

## 2021-12-09 PROCEDURE — 85025 COMPLETE CBC W/AUTO DIFF WBC: CPT

## 2021-12-09 PROCEDURE — 80053 COMPREHEN METABOLIC PANEL: CPT

## 2021-12-09 PROCEDURE — 80061 LIPID PANEL: CPT

## 2021-12-10 LAB
ALBUMIN SERPL-MCNC: 4.1 G/DL (ref 3.5–5)
ALBUMIN/GLOB SERPL: 1.1 {RATIO} (ref 1.1–2.2)
ALP SERPL-CCNC: 94 U/L (ref 45–117)
ALT SERPL-CCNC: 14 U/L (ref 12–78)
ANION GAP SERPL CALC-SCNC: 6 MMOL/L (ref 5–15)
AST SERPL-CCNC: 9 U/L (ref 15–37)
BASOPHILS # BLD: 0 K/UL (ref 0–0.1)
BASOPHILS NFR BLD: 0 % (ref 0–1)
BILIRUB SERPL-MCNC: 0.5 MG/DL (ref 0.2–1)
BUN SERPL-MCNC: 16 MG/DL (ref 6–20)
BUN/CREAT SERPL: 24 (ref 12–20)
CALCIUM SERPL-MCNC: 9.3 MG/DL (ref 8.5–10.1)
CHLORIDE SERPL-SCNC: 109 MMOL/L (ref 97–108)
CHOLEST SERPL-MCNC: 164 MG/DL
CO2 SERPL-SCNC: 27 MMOL/L (ref 21–32)
COMMENT, HOLDF: NORMAL
CREAT SERPL-MCNC: 0.67 MG/DL (ref 0.55–1.02)
DIFFERENTIAL METHOD BLD: NORMAL
EOSINOPHIL # BLD: 0.1 K/UL (ref 0–0.4)
EOSINOPHIL NFR BLD: 2 % (ref 0–7)
ERYTHROCYTE [DISTWIDTH] IN BLOOD BY AUTOMATED COUNT: 12.6 % (ref 11.5–14.5)
GLOBULIN SER CALC-MCNC: 3.6 G/DL (ref 2–4)
GLUCOSE SERPL-MCNC: 91 MG/DL (ref 65–100)
HCT VFR BLD AUTO: 41.6 % (ref 35–47)
HDLC SERPL-MCNC: 54 MG/DL
HDLC SERPL: 3 {RATIO} (ref 0–5)
HGB BLD-MCNC: 13.1 G/DL (ref 11.5–16)
IMM GRANULOCYTES # BLD AUTO: 0 K/UL (ref 0–0.04)
IMM GRANULOCYTES NFR BLD AUTO: 0 % (ref 0–0.5)
LDLC SERPL CALC-MCNC: 99.4 MG/DL (ref 0–100)
LYMPHOCYTES # BLD: 2.1 K/UL (ref 0.8–3.5)
LYMPHOCYTES NFR BLD: 30 % (ref 12–49)
MCH RBC QN AUTO: 29.9 PG (ref 26–34)
MCHC RBC AUTO-ENTMCNC: 31.5 G/DL (ref 30–36.5)
MCV RBC AUTO: 95 FL (ref 80–99)
MONOCYTES # BLD: 0.8 K/UL (ref 0–1)
MONOCYTES NFR BLD: 11 % (ref 5–13)
NEUTS SEG # BLD: 4 K/UL (ref 1.8–8)
NEUTS SEG NFR BLD: 57 % (ref 32–75)
NRBC # BLD: 0 K/UL (ref 0–0.01)
NRBC BLD-RTO: 0 PER 100 WBC
PLATELET # BLD AUTO: 383 K/UL (ref 150–400)
PMV BLD AUTO: 11.6 FL (ref 8.9–12.9)
POTASSIUM SERPL-SCNC: 4.1 MMOL/L (ref 3.5–5.1)
PROT SERPL-MCNC: 7.7 G/DL (ref 6.4–8.2)
RBC # BLD AUTO: 4.38 M/UL (ref 3.8–5.2)
SAMPLES BEING HELD,HOLD: NORMAL
SODIUM SERPL-SCNC: 142 MMOL/L (ref 136–145)
TRIGL SERPL-MCNC: 53 MG/DL (ref ?–150)
TSH SERPL DL<=0.05 MIU/L-ACNC: 1.17 UIU/ML (ref 0.36–3.74)
VLDLC SERPL CALC-MCNC: 10.6 MG/DL
WBC # BLD AUTO: 7 K/UL (ref 3.6–11)

## 2022-01-25 ENCOUNTER — HOSPITAL ENCOUNTER (OUTPATIENT)
Dept: LAB | Age: 27
Discharge: HOME | End: 2022-01-25

## 2022-01-25 PROCEDURE — 87491 CHLMYD TRACH DNA AMP PROBE: CPT

## 2022-01-27 LAB
C TRACH RRNA SPEC QL NAA+PROBE: NEGATIVE
N GONORRHOEA RRNA SPEC QL NAA+PROBE: NEGATIVE
SPECIMEN SOURCE: NORMAL

## 2022-06-30 ENCOUNTER — APPOINTMENT (OUTPATIENT)
Dept: CT IMAGING | Age: 27
End: 2022-06-30
Attending: EMERGENCY MEDICINE

## 2022-06-30 ENCOUNTER — HOSPITAL ENCOUNTER (EMERGENCY)
Age: 27
Discharge: HOME | End: 2022-07-01
Attending: EMERGENCY MEDICINE

## 2022-06-30 VITALS
OXYGEN SATURATION: 100 % | SYSTOLIC BLOOD PRESSURE: 162 MMHG | HEIGHT: 64 IN | BODY MASS INDEX: 19.97 KG/M2 | HEART RATE: 85 BPM | TEMPERATURE: 97.5 F | RESPIRATION RATE: 16 BRPM | DIASTOLIC BLOOD PRESSURE: 81 MMHG | WEIGHT: 117 LBS

## 2022-06-30 DIAGNOSIS — S09.90XA INJURY OF HEAD, INITIAL ENCOUNTER: Primary | ICD-10-CM

## 2022-06-30 DIAGNOSIS — S16.1XXA STRAIN OF NECK MUSCLE, INITIAL ENCOUNTER: ICD-10-CM

## 2022-06-30 PROCEDURE — 70450 CT HEAD/BRAIN W/O DYE: CPT

## 2022-06-30 PROCEDURE — 72125 CT NECK SPINE W/O DYE: CPT

## 2022-06-30 PROCEDURE — 74011250637 HC RX REV CODE- 250/637: Performed by: EMERGENCY MEDICINE

## 2022-06-30 PROCEDURE — 99284 EMERGENCY DEPT VISIT MOD MDM: CPT

## 2022-06-30 RX ORDER — IBUPROFEN 800 MG/1
800 TABLET ORAL
Status: COMPLETED | OUTPATIENT
Start: 2022-06-30 | End: 2022-06-30

## 2022-06-30 RX ORDER — IBUPROFEN 800 MG/1
800 TABLET ORAL
Qty: 30 TABLET | Refills: 0 | Status: SHIPPED | OUTPATIENT
Start: 2022-06-30

## 2022-06-30 RX ORDER — CYCLOBENZAPRINE HCL 10 MG
10 TABLET ORAL
Status: COMPLETED | OUTPATIENT
Start: 2022-06-30 | End: 2022-06-30

## 2022-06-30 RX ORDER — CYCLOBENZAPRINE HCL 10 MG
10 TABLET ORAL 2 TIMES DAILY
Qty: 20 TABLET | Refills: 0 | Status: SHIPPED | OUTPATIENT
Start: 2022-06-30

## 2022-06-30 RX ADMIN — CYCLOBENZAPRINE 10 MG: 10 TABLET, FILM COATED ORAL at 22:46

## 2022-06-30 RX ADMIN — IBUPROFEN 800 MG: 800 TABLET, FILM COATED ORAL at 22:46

## 2022-06-30 NOTE — Clinical Note
[address]  [facility] EMERGENCY DEPT  [address] [id number]  [phone]    Work/School Note    Date: 6/30/2022    To Whom It May concern:    Brianna Mckeon was seen and treated today in the emergency room by the following provider(s):  Attending Provider: Zohaib Quintanilla MD.      Brianna Mckeon is excused from work/school on 06/30/22 and 07/01/22. She is medically clear to return to work/school on 7/2/2022.        Sincerely,          Greg Rebolledo MD

## 2022-06-30 NOTE — Clinical Note
[address]  [facility] EMERGENCY DEPT  [address] [phone]  [phone]    Work/School Note    Date: 6/30/2022    To Whom It May concern:    Hubert Pool was seen and treated today in the emergency room by the following provider(s):  Attending Provider: Mai Joiner MD.      Hubert Pool is excused from work/school on 06/30/22 and 07/01/22. She is medically clear to return to work/school on 7/2/2022.        Sincerely,          Ck Delgado MD

## 2022-07-01 NOTE — ED PROVIDER NOTES
82-year-old female who presents to the ER for evaluation for head and neck injury sustained after she fell down several steps this morning. She is complaining of headache and neck pain. She denies any loss of consciousness, nausea or vomiting, abdominal pain, diarrhea, constipation, dysuria, dizziness, extremity weakness or numbness, sick contact, skin rash or recent travel. No past medical history on file. No past surgical history on file. No family history on file. Social History     Socioeconomic History    Marital status: SINGLE     Spouse name: Not on file    Number of children: Not on file    Years of education: Not on file    Highest education level: Not on file   Occupational History    Not on file   Tobacco Use    Smoking status: Never Smoker    Smokeless tobacco: Never Used   Substance and Sexual Activity    Alcohol use: No    Drug use: No    Sexual activity: Yes   Other Topics Concern    Not on file   Social History Narrative    Not on file     Social Determinants of Health     Financial Resource Strain:     Difficulty of Paying Living Expenses: Not on file   Food Insecurity:     Worried About Running Out of Food in the Last Year: Not on file    Triston of Food in the Last Year: Not on file   Transportation Needs:     Lack of Transportation (Medical): Not on file    Lack of Transportation (Non-Medical):  Not on file   Physical Activity:     Days of Exercise per Week: Not on file    Minutes of Exercise per Session: Not on file   Stress:     Feeling of Stress : Not on file   Social Connections:     Frequency of Communication with Friends and Family: Not on file    Frequency of Social Gatherings with Friends and Family: Not on file    Attends Jainism Services: Not on file    Active Member of Clubs or Organizations: Not on file    Attends Club or Organization Meetings: Not on file    Marital Status: Not on file   Intimate Partner Violence:     Fear of Current or Ex-Partner: Not on file    Emotionally Abused: Not on file    Physically Abused: Not on file    Sexually Abused: Not on file   Housing Stability:     Unable to Pay for Housing in the Last Year: Not on file    Number of Places Lived in the Last Year: Not on file    Unstable Housing in the Last Year: Not on file         ALLERGIES: Penicillins    Review of Systems   All other systems reviewed and are negative. Vitals:    06/30/22 2221   BP: (!) 162/81   Pulse: 85   Resp: 16   Temp: 97.5 °F (36.4 °C)   SpO2: 100%   Weight: 53.1 kg (117 lb)   Height: 5' 4\" (1.626 m)            Physical Exam  Vitals and nursing note reviewed. Exam conducted with a chaperone present. CONSTITUTIONAL: Well-appearing; well-nourished; in no apparent distress  HEAD: Normocephalic; atraumatic  EYES: PERRL; EOM intact; conjunctiva and sclera are clear bilaterally. ENT: No rhinorrhea; normal pharynx with no tonsillar hypertrophy; mucous membranes pink/moist, no erythema, no exudate. NECK: Supple; non-tender; no cervical lymphadenopathy  CARD: Normal S1, S2; no murmurs, rubs, or gallops. Regular rate and rhythm. RESP: Normal respiratory effort; breath sounds clear and equal bilaterally; no wheezes, rhonchi, or rales. ABD: Normal bowel sounds; non-distended; non-tender; no palpable organomegaly, no masses, no bruits. Back Exam: Normal inspection; no vertebral point tenderness, no CVA tenderness. Normal range of motion. EXT: Normal ROM in all four extremities; non-tender to palpation; no swelling or deformity; distal pulses are normal, no edema. SKIN: Warm; dry; no rash.   NEURO:Alert and oriented x 3, coherent, JOHN-XII grossly intact, sensory and motor are non-focal.        MDM  Number of Diagnoses or Management Options  Injury of head, initial encounter  Strain of neck muscle, initial encounter  Diagnosis management comments: Assessment: 62-year-old female who presents to the ER for evaluation for head and neck injury status post fall. The patient has a pelvic exam with stable vital signs. She will need evaluation for ICH and cervical spine pathology. She has no focal neurological findings. The patient has not medicated herself at this time. Plan: CT scan of the head/CT scan of the cervical spine/analgesia and muscle relaxant/education, reassurance, symptomatic treatment/ Monitor and Reevaluate. Amount and/or Complexity of Data Reviewed  Clinical lab tests: ordered and reviewed  Tests in the radiology section of CPT®: ordered and reviewed  Tests in the medicine section of CPT®: reviewed and ordered  Discussion of test results with the performing providers: yes  Decide to obtain previous medical records or to obtain history from someone other than the patient: yes  Obtain history from someone other than the patient: yes  Review and summarize past medical records: yes  Discuss the patient with other providers: yes  Independent visualization of images, tracings, or specimens: yes    Risk of Complications, Morbidity, and/or Mortality  Presenting problems: moderate  Diagnostic procedures: moderate    Patient Progress  Patient progress: stable         Procedures        Progress Note:   Pt has been reexamined by Abhinav Dockery MD. Pt is feeling much better. Symptoms have improved. All available results have been reviewed with pt and any available family. Pt understands sx, dx, and tx in ED. Care plan has been outlined and questions have been answered. Pt is ready to go home. Will send home on head injury and cervical spine strain instruction. Prescription of Motrin and Flexeril. . Outpatient referral with PCP as needed. Written by Abhinav Dockery MD,11:57 PM    .   .

## 2022-07-01 NOTE — ED TRIAGE NOTES
Pt arrives ambulatory via POV w/ c/c of fall down 5 carpeted stairs. Pt reports that she hit her head, R shoulder, and neck. Pt reports pain in head, R shoulder, & R neck. No LOC.

## 2022-12-06 ENCOUNTER — HOSPITAL ENCOUNTER (OUTPATIENT)
Dept: LAB | Age: 27
Discharge: HOME | End: 2022-12-06

## 2022-12-06 LAB
ALBUMIN SERPL-MCNC: 4.5 G/DL (ref 3.5–5)
ALBUMIN/GLOB SERPL: 1.4 {RATIO} (ref 1.1–2.2)
ALP SERPL-CCNC: 72 U/L (ref 45–117)
ALT SERPL-CCNC: 17 U/L (ref 12–78)
ANION GAP SERPL CALC-SCNC: 6 MMOL/L (ref 5–15)
AST SERPL-CCNC: 11 U/L (ref 15–37)
BASOPHILS # BLD: 0 K/UL (ref 0–0.1)
BASOPHILS NFR BLD: 0 % (ref 0–1)
BILIRUB SERPL-MCNC: 1.4 MG/DL (ref 0.2–1)
BUN SERPL-MCNC: 10 MG/DL (ref 6–20)
BUN/CREAT SERPL: 17 (ref 12–20)
CALCIUM SERPL-MCNC: 9.3 MG/DL (ref 8.5–10.1)
CHLORIDE SERPL-SCNC: 105 MMOL/L (ref 97–108)
CHOLEST SERPL-MCNC: 154 MG/DL
CO2 SERPL-SCNC: 28 MMOL/L (ref 21–32)
CREAT SERPL-MCNC: 0.6 MG/DL (ref 0.55–1.02)
DIFFERENTIAL METHOD BLD: NORMAL
EOSINOPHIL # BLD: 0.1 K/UL (ref 0–0.4)
EOSINOPHIL NFR BLD: 2 % (ref 0–7)
ERYTHROCYTE [DISTWIDTH] IN BLOOD BY AUTOMATED COUNT: 12.4 % (ref 11.5–14.5)
GLOBULIN SER CALC-MCNC: 3.2 G/DL (ref 2–4)
GLUCOSE SERPL-MCNC: 78 MG/DL (ref 65–100)
HCT VFR BLD AUTO: 41.8 % (ref 35–47)
HDLC SERPL-MCNC: 64 MG/DL
HDLC SERPL: 2.4 {RATIO} (ref 0–5)
HGB BLD-MCNC: 13.7 G/DL (ref 11.5–16)
IMM GRANULOCYTES # BLD AUTO: 0 K/UL (ref 0–0.04)
IMM GRANULOCYTES NFR BLD AUTO: 0 % (ref 0–0.5)
LDLC SERPL CALC-MCNC: 80 MG/DL (ref 0–100)
LYMPHOCYTES # BLD: 2 K/UL (ref 0.8–3.5)
LYMPHOCYTES NFR BLD: 26 % (ref 12–49)
MCH RBC QN AUTO: 30.2 PG (ref 26–34)
MCHC RBC AUTO-ENTMCNC: 32.8 G/DL (ref 30–36.5)
MCV RBC AUTO: 92.1 FL (ref 80–99)
MONOCYTES # BLD: 0.8 K/UL (ref 0–1)
MONOCYTES NFR BLD: 11 % (ref 5–13)
NEUTS SEG # BLD: 4.9 K/UL (ref 1.8–8)
NEUTS SEG NFR BLD: 61 % (ref 32–75)
NRBC # BLD: 0 K/UL (ref 0–0.01)
NRBC BLD-RTO: 0 PER 100 WBC
PLATELET # BLD AUTO: 338 K/UL (ref 150–400)
PMV BLD AUTO: 12 FL (ref 8.9–12.9)
POTASSIUM SERPL-SCNC: 3.9 MMOL/L (ref 3.5–5.1)
PROT SERPL-MCNC: 7.7 G/DL (ref 6.4–8.2)
RBC # BLD AUTO: 4.54 M/UL (ref 3.8–5.2)
SODIUM SERPL-SCNC: 139 MMOL/L (ref 136–145)
TRIGL SERPL-MCNC: 50 MG/DL (ref ?–150)
TSH SERPL DL<=0.05 MIU/L-ACNC: 1.45 UIU/ML (ref 0.36–3.74)
VLDLC SERPL CALC-MCNC: 10 MG/DL
WBC # BLD AUTO: 7.9 K/UL (ref 3.6–11)

## 2022-12-06 PROCEDURE — 80061 LIPID PANEL: CPT

## 2022-12-06 PROCEDURE — 85025 COMPLETE CBC W/AUTO DIFF WBC: CPT

## 2022-12-06 PROCEDURE — 84443 ASSAY THYROID STIM HORMONE: CPT

## 2022-12-06 PROCEDURE — 80053 COMPREHEN METABOLIC PANEL: CPT

## 2022-12-06 PROCEDURE — 36415 COLL VENOUS BLD VENIPUNCTURE: CPT

## 2023-01-18 NOTE — IP AVS SNAPSHOT
[address] 
[phone] Patient: Danni De Leon MRN: [id number] :1995 About your hospitalization You were admitted on:  May 31, 2018 You last received care in the:  [facility] 2 LABOR & DELIVERY You were discharged on:  Marni 3, 2018 Why you were hospitalized Your primary diagnosis was:  Not on File Your diagnoses also included:  Pregnancy, Iugr (Intrauterine Growth Restriction) Affecting Care Of Mother Follow-up Information Follow up With Details Comments Contact Info Lindsay Do MD Go on 2018 BP check at 10:40a Kimberly Madre Yakelin Gwendolyn Alanis [address] [place] [address] 
[phone] Lindsay Do MD Schedule an appointment as soon as possible for a visit in 6 weeks 6 week post partum visit Kimberly Madre Yakelin Gwendolyn Alanis [address] [place] [address] 
[phone] Your Scheduled Appointments 2018 10:45 AM EDT  
OB VISIT with Lindsay Do MD  
[address] [place] [address]  
[phone]  10:15 AM EDT  
OB BPP with ULTRASOUND 3 SFM  
[facility]  CENTER ([facility]) [address] [place] [address]  
[phone] Discharge Orders None A check christian indicates which time of day the medication should be taken. My Medications START taking these medications Instructions Each Dose to Equal  
 Morning Noon Evening Bedtime  
 ibuprofen 800 mg tablet Commonly known as:  MOTRIN Your last dose was: Your next dose is: Take 1 Tab by mouth every eight (8) hours as needed for Pain. 800 mg NIFEdipine ER 30 mg ER tablet Commonly known as:  PROCARDIA XL Your last dose was: Your next dose is: Take 1 Tab by mouth daily. 30 mg CONTINUE taking these medications Instructions Each Dose to Equal  
 Morning Noon Evening Bedtime  
 prenatal vit-calcium-iron-fa 27 mg iron- 1 mg Tab Commonly known as:  PRENATAL PLUS with CALCIUM Your last dose was: Your next dose is: Take 1 Tab by mouth daily. 1 Tab STOP taking these medications   
 pyridoxine (vitamin B6) 50 mg tablet Commonly known as:  VITAMIN B-6 Where to Get Your Medications Information on where to get these meds will be given to you by the nurse or doctor. ! Ask your nurse or doctor about these medications  
  ibuprofen 800 mg tablet NIFEdipine ER 30 mg ER tablet Discharge Instructions Take ibuprofen every 8 hours as needed for pain. Continue to take your prenatal vitamin and blood pressure medication daily. You will need to go to your doctor's appointment on 6/6/18 at 10:40a to have a blood pressure check. El período posparto: Instrucciones de cuidado - [ Postpartum: Care Instructions ] Instrucciones de cuidado Después del parto (período posparto), russell cuerpo pasa por muchos cambios. Algunos de estos cambios suceden gigi varias semanas. [address], el cuerpo comienza a recuperarse y se prepara para el amamantamiento. Es posible que [address] se sienta sensible. Las hormonas pueden cambiar russell estado de ánimo sin advertencia y sin motivo aparente. Gigi las primeras [address] después del parto, muchas mujeres tienen emociones que [place] de titus a dany. Es posible que le resulte difícil dormir. Es posible que llore mucho. [place] se llama \"melancolía de la maternidad\". Estas emociones abrumadoras suelen desaparecer dentro de unos días o semanas. Tripp es importante hablar con russell médico acerca de destin sentimientos.  
[address], es fácil cansarse demasiado o sentirse [place]. No richie demasiados esfuerzos. Descanse cada vez que pueda, acepte la ayuda de los demás, coma emi y mary abundantes líquidos. Entre 4 y 6 semanas después del nacimiento de russell bebé, tendrá donna consulta de seguimiento con russell médico. Esta visita es russell oportunidad para hablar con russell médico sobre cualquier inquietud o rafael que tenga. La atención de seguimiento es donna parte clave de russell tratamiento y seguridad. Asegúrese de hacer y acudir a todas las citas, y llame a russell médico si está teniendo problemas. También es donna buena idea saber los resultados de los exámenes y mantener donna lista de los medicamentos que cyril. Cómo puede cuidarse en el hogar? · Duerma o descanse cuando russell bebé lo richie. · Si es posible, permita que destin familiares o destin amigos la ayuden con las tareas del hogar. No intente hacerlo todo usted jeff. · Si tiene hemorroides o hinchazón o dolor alrededor de la abertura de la vagina, pruebe aplicarse frío y calor. Puede aplicarse hielo o donna compresa fría en la abhijit gigi 10 a 20 minutos cada vez. Póngase un paño daniel entre el hielo y la piel. Además, trate de sentarse en algunos centímetros de agua tibia (baño de asiento) 3 veces al día y después de las evacuaciones. · [employer] analgésicos (medicamentos para el dolor) exactamente según las indicaciones. ¨ Si el médico le recetó un analgésico, tómelo según las indicaciones. ¨ Si no está tomando un analgésico recetado, pregúntele a russell médico si puede patsy lindsay de [address] · Coma más fibra para evitar el estreñimiento. Incluya alimentos krystin panes y cereales integrales, verduras crudas, frutas crudas y secas, y frijoles (habichuelas). · Mary abundantes líquidos, los suficientes krystin para que russell orina sea de color amarillo polo o transparente krystin el agua. Si tiene [place] [facility], del corazón o del hígado y tiene que Rosas's líquidos, hable con russell médico antes de aumentar russell consumo. · No se lave el interior de la vagina con líquidos (lavados vaginales). · Si tiene puntos de sutura, mantenga la abhijit limpia con agua tibia, ya sea echándola o rociándola sobre la abhijit externa de la vagina y el ano después de usar el baño. · Elijah donna lista de preguntas y llévela a russell consulta posparto. Lis preguntas podrían tratar sobre: 
¨ [employer] senos, krystin bultos o sensibilidad. ¨ Cuándo esperar que regrese russell período menstrual. 
¨ Qué forma de anticoncepción es la más adecuada para usted. ¨ El peso que aumentó gigi el [facility]. ¨ Las opciones de ejercicio. ¨ Qué alimentos y bebidas son mejores para usted, sobre todo si está amamantando. ¨ Problemas que podría tener con la lactancia. ¨ Cuándo puede [employer]. Algunas mujeres janna vez quieran hablar sobre lubricantes vaginales. ¨ Cualquier sentimiento de tristeza o inquietud que tenga. Cuándo debe pedir ayuda? Llame al 911 en cualquier momento que considere que necesita atención de [place]. Por ejemplo, llame si: 
? · Tiene pensamientos sobre [place] daño a sí misma, a russell bebé o a otra persona. ? · Se desmayó (perdió el conocimiento). ?Llame a russell médico ahora mismo o busque atención médica inmediata si: 
? · Russell sangrado vaginal parece hacerse más abundante. ? · Se siente mareada o aturdida, o que está a punto de [place]. ? · Tiene fiebre. ?Preste especial atención a los cambios en russell cristi y asegúrese de comunicarse con russell médico si: 
? · Tiene nuevo flujo vaginal o fausto empeora. ? · Se siente dany o deprimida. ? · Tiene problemas con los senos o el amamantamiento. Dónde puede encontrar más información en inglés? Lena Ko a [email or web address]://[email or web address]-[email or web address].info/. Samanta Kam I144 en la búsqueda para aprender más acerca de \"El período posparto: Instrucciones de cuidado - [ Postpartum: Care Instructions ]. \" 
Revisado: 16 marzo, 2017 Versión del contenido: 11.4 © 0151-3334 Healthwise, Incorporated. Las instrucciones de cuidado fueron adaptadas bajo licencia por Good Help Connections (which disclaims liability or warranty for this information). Si usted tiene [place] afección médica o sobre estas instrucciones, siempre pregunte a turner profesional de cristi. Healthwise, Incorporated niega toda garantía o responsabilidad por turner uso de esta información. Desgarro perineal: Jelly Brown en el hogar - [ Perineal Tear: What to Expect at [facility] ] Turner recuperación Un desgarro perineal puede ocurrir cuando usted da a doe a turner hijo (parto). Es un desgarro en el perineo, que es el área entre la vagina y el ano. Después del [place], el médico o la partera generalmente cierran el desgarro perineal con puntos de sutura. Los puntos de sutura se disolverán en 1 a 2 semanas, por lo que no es necesario quitarlos. Usted podría notar fragmentos de los puntos en turner toalla sanitaria o en el papel higiénico cuando vaya al baño. [place] es normal. 
Algunas veces, un desgarro pequeño no se cerrará con puntos de sutura y se dejará sanar por sí mismo. Puede usar donna compresa de hielo sobre el perineo para aliviar el dolor y la hinchazón. La recuperación puede ser incómoda o dolorosa, dependiendo de la profundidad y la longitud del desgarro. Es más dolorosa al comienzo, tripp usted debería sentirse mejor cada día. El dolor típicamente afecta sentarse, caminar, orinar y las evacuaciones intestinales, al menos por [place]. Turner primera evacuación intestinal podría resultarle dolorosa. Un desgarro suele sanar en aproximadamente 4 a 6 semanas. Esta hoja de [employer] idea general sobre cuánto tiempo tardará en recuperarse. Tripp cada whitney se recupera a un ritmo diferente. Siga los pasos a continuación para sentirse mejor acevedo pronto krystin sea posible. Cómo puede cuidarse en el hogar? Actividad ? · Descanse cuando se sienta fatigada. Dormir lo suficiente le ayudará a recuperarse. ? · Trate de caminar todos los días. Comience caminando un poco más de lo que caminó el día anterior. Poco a poco, aumente la cantidad que camina. Caminar aumenta el flujo de Mo shen y [place] a prevenir la neumonía y el estreñimiento. ? · Evite actividades arduas, krystin [employer], trotar, levantar pesas o hacer ejercicio aeróbico, hasta que turner médico le diga que Bose. ? · Hasta que turner médico lo apruebe, no levante nada más pesado que turner bebé. ? · Pregúntele a turner médico cuándo puede volver a conducir. ? · Puede patsy duchas y mary igual que siempre. Seque la herida con toques suaves de toalla cuando termine. ? · Tendrá algún sangrado vaginal. Use toallas sanitarias. No se richie lavados vaginales ni use tampones hasta que el médico se lo permita. ? · Pregúntele a turner médico cuándo puede tener [employer]. ?Dieta ? · Puede comer turner dieta normal.  
? · Mary abundantes líquidos (a menos que turner médico le diga que no). ? · Podría notar que ash después del parto no evacúa el intestino con regularidad. [place] es común. Trate de evitar el estreñimiento y de no hacer esfuerzos cuando evacúa el intestino. Michele Shape patsy un suplemento de [employer]. Si no ha evacuado el intestino después de un par de días, pregúntele a turner médico si puede patsy un laxante suave. Medicamentos ? · Turner médico le dirá si puede volver a patsy destin medicamentos y cuándo puede volver a hacerlo. También le dará indicaciones sobre cualquier medicamento nuevo que deba patsy usted. ? · Si cyril medicamentos que previenen la formación de coágulos de [place], krystin warfarina (Coumadin), clopidogrel (Plavix) o aspirina, asegúrese de hablar con turner médico. Él o arvin le dirá si debe volver a patsy estos medicamentos y en qué momento. Asegúrese de que entiende exactamente lo que el médico quiere que richie. ? · Si tiene dolor, tome los analgésicos (medicamentos para el dolor) exactamente krystin le fueron indicados. ¨ Si el médico le recetó un analgésico, tómelo según las indicaciones. ¨ Si no está tomando un analgésico recetado, pregúntele a russell médico si puede patsy un medicamento de [employer]. ? · Si usted piensa que russell analgésico le está provocando malestar estomacal: ¨ Neville russell medicamento después de las comidas (a menos que russell médico le haya dicho que no). ¨ Pídale a russell médico un analgésico diferente. ?Cuidado de la herida ? · Aplique hielo o donna compresa fría sobre la abhijit adolorida por 10 a 20 minutos a la vez. Póngase un paño daniel entre el hielo y la piel. ? · Siéntese en unos pocos centímetros de agua tibia (baño de asiento) gigi 15 a 20 minutos 3 veces al día, y después de evacuar el intestino. Luego seque la abhijit con toques suaves de toalla. Hágalo mientras tenga dolor. Posiblemente note que secar la abhijit con un secador de pelo en vez de donna toalla es menos doloroso. ? · Mantenga la abhijit limpia con agua tibia, ya sea echándola o rociándola sobre la abhijit externa de la vagina y el ano después de usar el baño. Utilice toallitas para bebés o toallitas medicinales, krystin T[place], en lugar de papel higiénico después de evacuar el intestino. La atención de seguimiento es donna parte clave de russell tratamiento y seguridad. Asegúrese de hacer y acudir a todas las citas, y llame a russell médico si está teniendo problemas. También es donna buena idea saber los resultados de los exámenes y mantener donna lista de los medicamentos que cyril. Cuándo debe pedir ayuda? Llame al 911 en cualquier momento que considere que necesita atención de emergencia. Por ejemplo, llame si: 
? · Se desmayó (perdió el conocimiento). ?Llame a russell médico ahora mismo o busque atención médica inmediata si: 
? · Tiene sangrado vaginal intenso. Está expulsando coágulos de Bhavin Camarena y empapa donna toalla sanitaria cada hora por 2 horas o más. ? · Siente mareos o aturdimiento, o siente que está a punto de [place]. ? · Tiene fiebre. ? · Tiene dolor o hinchazón nuevos o más intensos en la abhijit vaginal o anal. ?Preste especial atención a los cambios de russell cristi y asegúrese de comunicarse con russell médico si: 
? · El sangrado vaginal parece hacerse más landy. ? · Tiene nuevo flujo vaginal o éste empeora. ? · Se siente dany, ansiosa o desesperanzada [address] de unos días. ? · No mejora krystin se esperaba. Dónde puede encontrar más información en inglés? Jamir Meza a [email or web address] Mariposa Winn M578 en la búsqueda para aprender más acerca de \"Desgarro perineal: Alicja Workman en el [facility] - [ Perineal Tear: What to Expect at [facility] ]. \" 
Revisado: 16 marzo, 2017 Versión del contenido: 11.4 © 0303-2221 Healthwise, Incorporated. Las instrucciones de cuidado fueron adaptadas bajo licencia por Good Help Connections (which disclaims liability or warranty for this information). Si usted tiene [place] afección médica o sobre estas instrucciones, siempre pregunte a russell profesional de cristi. Healthwise, Incorporated niega toda garantía o responsabilidad por russell uso de esta información. Estrés en los padres de bebés: Instrucciones de cuidado - [ Denise Lee in Parents of Infants: Care Instructions ] Instrucciones de cuidado Cumplir con las grandes demandas de ser un nuevo padre puede ser un gran desafío. Es fácil cansarse en exceso y abrumarse en las primeras semanas. Lo que antes era donna tarea sencilla, krystin ir de compras, no es acevedo sencillo ahora. Además, ahora tiene nuevas tareas, entre ellas alimentar y Torsten [place] a russell nuevo bebé. Al final del día, janna vez esté acevedo cansado que desee llorar. En lugar de esperar con ilusión el día siguiente, janna vez le kranthi al ttwick. [facility] [place] padres, usted está consumido por el estrés de tener un nuevo bebé. El estrés afecta a cada quien de [place], y las maneras más efectivas de aliviarlo son distintas para cada persona.  Usted puede probar con diferentes métodos para averiguar cuáles le funcionan mejor. Con el paso de [employer], comenzará a desarrollar un ritmo con russell bebé. Las tareas que ahora parecen tomarle eternidades se harán más fáciles. Muchas mujeres sienten la \"melancolía de la maternidad\" gigi los primeros días después del [place]. Si usted es donna nueva madre y la \"melancolía de la maternidad\" dura más de unos pocos días, llame a russell médico de inmediato. La depresión es donna afección médica que requiere tratamiento. La atención de seguimiento es donna parte clave de russell tratamiento y seguridad. Asegúrese de hacer y acudir a todas las citas, y llame a russell médico si está teniendo problemas. También es donna buena idea saber los resultados de los exámenes y mantener donna lista de los medicamentos que cyril. Cómo puede cuidarse en el \[facility]\""? · Sea padron con usted mismo. Russell nuevo bebé conlleva Stephanie Regalado, lorena también puede darle mucha satisfacción. No se preocupe por la casa gigi un tiempo. · Permita que destin amigos le lleven comida o leidy las tareas del \[facility]\"". · Limite los visitantes al número que pueda [place], o pídales que no lo visiten por un tiempo. Antes de que vengan, ponga un límite al [employer]. · Duerma cuando russell bebé duerma. Incluso donna siesta corta ayuda. · [place] qué desencadena russell estrés, y evite esas cosas tanto krystin pueda. · Si amamanta, aprenda cómo acumular y [facility] materna para que russell denisse o niñera pueda alimentar a russell bebé mientras usted duerme. · Coma donna dieta balanceada para poder mantener russell energía. · Mary abundantes líquidos gigi todo el día. · Evite la cafeína y el alcohol. La cafeína se encuentra en el café, el té, las bebidas de cola, el chocolate y otros alimentos. · Limite los medicamentos que pueden hacerlo sentirse cansado, krystin los tranquilizantes y los medicamentos para el resfriado y las [place]. · Elijah ejercicio regularmente todos los días, krystin caminar, para ayudar a mejorar turner estado de ánimo. Después de hacer ejercicio, descanse. · Sea honesto con usted mismo y con destin seres queridos. Dígales que está estresado y cansado. · Hablar con otros nuevos padres puede ayudar. Turner hospital local podría tener grupos de apoyo para nuevos padres. Escuchar que otra persona está teniendo las mismas experiencias puede [employer]. · Si tiene la melancolía de la maternidad y persiste más de unos pocos días, llame a turner médico de inmediato. Cuándo debe pedir ayuda? Llame al 911 en cualquier momento que considere que necesita atención de emergencia. Por ejemplo, llame si: 
? · Tiene pensamientos sobre [place] daño a usted mismo, turner bebé u otra persona. ?Llame a turner médico ahora mismo o busque atención médica inmediata si: 
? · Tiene problemas para cuidar de usted mismo o turner bebé. ?Preste especial atención a los cambios en turner cristi y asegúrese de comunicarse con turner médico si tiene algún problema. Dónde puede encontrar más información en inglés? Stanley Pandey a [email or web address].info/. Escriba H142 en la búsqueda para aprender más acerca de \"Estrés en los padres de bebés: Instrucciones de cuidado - [ Elin Jeffery in Parents of Infants: Care Instructions ]. \" 
Revisado: 12 meza, 2017 Versión del contenido: 11.4 © 2615-4832 Healthwise, Incorporated. Las instrucciones de cuidado fueron adaptadas bajo licencia por Good Help Connections (which disclaims liability or warranty for this information). Si usted tiene [place] afección médica o sobre estas instrucciones, siempre pregunte a turner profesional de cristi. HealthCalhoun, Incorporated niega toda garantía o responsabilidad por turner uso de esta información. Aprenda sobre cómo empezar a amamantar - [ Allison Phoenix About Starting to Breastfeed ] Cómo planificar con antelación 
 
Planee por adelantado, antes de que nazca turner bebé.  Aprenda todo lo que Yes pueda acerca del amamantamiento. [place] le facilitará el amamantamiento. · A principios de russell embarazo, hable con russell médico o comadrona (partera) sobre el amamantamiento. · Aprenda los conceptos básicos antes de que nazca russell bebé. El personal en hospitales y centros de maternidad puede ayudarla a encontrar un especialista en lactancia. Esta persona suele ser [facility] en[facility] Valley[facility]a que está capacitada para enseñar y aconsejar a las mujeres sobre el amamantamiento. O emi, puede patsy [employer]V de [place]. · Planee con anticipación los momentos en los que necesitará ayuda después de que nazca russell bebé. Muchas mujeres reciben ayuda de destin familiares y [place]. Algunas se unen a un mercy de apoyo para hablar con otras madres que amamantan a destin bebés. · Compre los suministros que Lenoria Basil a necesitar. Por ejemplo, almohadillas de lactancia, crema para los pezones, almohadas adicionales y sostenes de lactancia. Averigüe también sobre extractores [place]fred[place]. [place] del hospital o del centro de maternidad Es importante que tenga apoyo de los médicos, las enfermeras y el personal hospitalario que los cuidan a usted y a russell bebé. Antes de que llegue el momento de felicia a doe, pregunte sobre las políticas de lactancia de russell hospital o centro [facility]TE Busque un hospital o centro de maternidad que tenga un reglamento para: · Alojamiento conjunto (\"Rooming in\"). Esta política es favorable a que usted tenga a russell bebé [employer] habitación. Puede permitirle amamantar con más frecuencia. · Alimentación suplementaria. Informe al personal de que russell bebé debe recibir [employer] materna desde russell nacimiento. Si el personal le da a russell bebé agua, donna solución azucarada o [place] de fórmula inmediatamente después de nacer sin razón Melinda, esto puede dificultarle a usted el amamantamiento. · Chupones o pezones artificiales.  El personal no debería darle a russell recién nacido estos artículos sin russell permiso. Podrían interferir con el amamantamiento. · Seguimiento. Pregunte si el hospital puede ayudarla con problemas de amamantamiento donna vez que [place] a russell hogar. Trate de [employer] grupos de apoyo u otro tipo de contacto. Cedric [place] le lukas útiles si necesita ayuda para establecer y mantener donna rutina de amamantamiento. Russell primera cyril Lo mejor es comenzar el amamantamiento dentro de 1 hora después del [place]. En cada cyril, usted pasa por estos pasos básicos: · Prepárese para la alimentación. Manténgase calmada y [place], y trate de no distraerse. Tenga agua o jugo a mano para patsy usted. Ruth Murray [employer]orporated o cathy almohadas para ayudar a sostener al bebé [employer] se [place]. · Encuentre donna posición para amamantar que sea cómoda tanto para usted krystin para russell bebé. Los ejemplos incluyen la posición de cuna y la posición de fútbol americano. Asegúrese de que la jens y el pecho del bebé estén alineados y orientados hacia russell pecho. Es mejor cambiar el seno con el que Sherran Ashely. · Consiga que el bebé se prenda apropiadamente. La boca del bebé debe estar emi abierta, krystin en un bostezo, por lo que puede que tenga que tocar delicadamente el centro del labio inferior de russell bebé. Cuando la boca del bebé esté emi abierta, acerque al bebé rápidamente al pezón y a la areola. La areola es la abhijit oscura alrededor del pezón. · Sukumar al bebé donna cyril completa. Deje que el bebé raya gigi al menos 15 minutos. Asegúrese de hacer eructar al bebé después de alimentarse de cada seno. En los primeros días después del nacimiento, lis senos elaboran un líquido espeso de color amarillo llamado calostro. Vonda líquido le proporciona al bebé nutrientes y anticuerpos para combatir las infecciones. Es todo lo que los bebés necesitan al principio. Lis senos se llenarán de AT[employer] unos días después del nacimiento. Hable de inmediato con russlel médico, comadrona o especialista en lactancia si tiene problemas y no sabe qué hacer. Frecuencia con la que [employer] Amamante a russell bebé cuando lo pida en lugar de establecer un horario estricto. Noel los primeros días, espere amamantar con donna frecuencia de 1 a 3 horas. [place] suele ser alrededor de 8 a 12 veces en un período de 24 horas. Despierte a un bebé que esté dormido para alimentarlo, si es necesario. Si amamanta con más frecuencia, esto ayudará a que destin senos produzcan [place]. Después de la vuelta al hogar Después de que vuelva a russell hogar, no dude en llamar a russell médico, comadrona o especialista en lactancia si tiene preguntas. Debería hacer esto incluso si no sabe cuál es el problema. Estos profesionales están acostumbrados a que [employer] padres de recién nacidos. Pueden ayudarla a averiguar si hay un problema y, si es así, a solucionarlo. Planee los momentos en que usted estará separada del bebé. Use un sacaleches para extraerse leche con anticipación. Puede almacenar la [employer]	[place] refrigerador o en el congelador. Así estará preparada cuando otra persona cuide a russell bebé. Amamantar es donna habilidad que se aprende y se torna más fácil con el tiempo. Usted tiene más probabilidades de tener éxito si planea con anticipación, aprende las técnicas básicas y [facility] dónde [place] y [de-identified]. Dónde puede encontrar más información en inglés? Karuna Lopezivener a [email or web address] Henok SARAVIA18 en la búsqueda para aprender más acerca de \"Aprenda sobre cómo empezar a amamantar - [ Missy Doctor About Starting to Breastfeed ]. \" 
Revisado: 16 marzo, 2017 Versión del contenido: 11.4 © 4986-3354 Healthwise, [employer]. Las instrucciones de cuidado fueron adaptadas bajo licencia por [employer] (which disclaims liability or warranty for this information).  Si usted tiene [place] afección médica o sobre estas instrucciones, siempre pregunte a russell profesional de cristi. [employer] niega toda garantía o responsabilidad por russell uso de esta información. MediSwipe Announcement We are excited to announce that we are making your provider's discharge notes available to you in MediSwipe. You will see these notes when they are completed and signed by the physician that discharged you from your recent hospital stay. If you have any questions or concerns about any information you see in MediSwipe, please call the Health Information Department where you were seen or reach out to your Primary Care Provider for more information about your plan of care. Introducing [facility]! Luis Phelan introduces MediSwipe patient portal. Now you can access parts of your medical record, email your doctor's office, and request medication refills online. 1. In your internet browser, go to [email or web address] 2. Click on the First Time User? Click Here link in the Sign In box. You will see the New Member Sign Up page. 3. Enter your MediSwipe Access Code exactly as it appears below. You will not need to use this code after youve completed the sign-up process. If you do not sign up before the expiration date, you must request a new code. · MediSwipe Access Code: [email or web address] Expires: 7/23/2018  3:32 PM 
 
4. Enter the last four digits of your Social Security Number (xxxx) and Date of Birth (mm/dd/yyyy) as indicated and click Submit. You will be taken to the next sign-up page. 5. Create a MediSwipe ID. This will be your MediSwipe login ID and cannot be changed, so think of one that is secure and easy to remember. 6. Create a MediSwipe password. You can change your password at any time. 7. Enter your Password Reset Question and Answer. This can be used at a later time if you forget your password. 8. Enter your e-mail address. You will receive e-mail notification when new information is available in [address] 9. Click Sign Up. You can now view and download portions of your medical record. 10. Click the Download Summary menu link to download a portable copy of your medical information. If you have questions, please visit the Frequently Asked Questions section of the [employer] website. Remember, [employer] is NOT to be used for urgent needs. For medical emergencies, dial 911. Now available from your iPhone and Android! Introducing Primo Alvarez As a Lily Sandoval patient, I wanted to make you aware of our electronic visit tool called Primo Antonio. Lily [employer] 24/7 allows you to connect within minutes with a medical provider 24 hours a day, seven days a week via a mobile device or tablet or logging into a secure website from your computer. You can access Primo Antonio from anywhere in the [place]. A virtual visit might be right for you when you have a simple condition and feel like you just dont want to get out of bed, or cant get away from work for an appointment, when [facility] provider is not available (evenings, weekends or holidays), or when youre out of town and need minor care. Electronic visits cost only $49 and if the Lily [employer] provider determines a prescription is needed to treat your condition, one can be electronically transmitted to a nearby pharmacy*. Please take a moment to enroll today if you have not already done so. The enrollment process is free and takes just a few minutes. To enroll, please download the [employer] pato to your tablet or phone, or visit [email or web address] to enroll on your computer. And, as an [address] [place] [address] patient with a [employer] account, the results of your visits will be scanned into your electronic medical record and your primary care provider will be able to view the scanned results.    
We urge you to continue to [facility] for your ongoing medical care. And while your primary care provider may not be the one available when you seek a Primo Alvarez virtual visit, the peace of mind you get from getting a real diagnosis real time can be priceless. For more information on Primo Alvarez, view our Frequently Asked Questions (FAQs) at [email or web address]. Sincerely, 
 
June Hoffmann MD 
Chief Medical Officer Marjorie Medina *:  certain medications cannot be prescribed via Primo Alvarez Providers Seen During Your Hospitalization Provider Specialty Primary office phone Higinio Antunez MD Family Practice [phone] Tish Velázquez MD Family Practice [phone] Your Primary Care Physician (PCP) Primary Care Physician Office Phone Office Fax [facility] [phone] You are allergic to the following Allergen Reactions Penicillins Hives Recent Documentation Breastfeeding? OB Status Smoking Status Yes Recent pregnancy Never Smoker Emergency Contacts Name Discharge Info Relation Home Work Mobile Nazario Gray CAREGIVER [3] Friend [5] [phone] Patient Belongings The following personal items are in your possession at time of discharge: 
  Dental Appliances: None         Home Medications: None   Jewelry: Necklace, Earrings  Clothing: None    Other Valuables: Estrella Barfield Please provide this summary of care documentation to your next provider. Signatures-by signing, you are acknowledging that this After Visit Summary has been reviewed with you and you have received a copy. Patient Signature:  ____________________________________________________________ Date:  ____________________________________________________________  
  
Shanelle Sauer Provider Signature:  ____________________________________________________________ Date:  ____________________________________________________________

## 2024-04-12 NOTE — PROGRESS NOTES
Chief Complaint   Patient presents with    Blood Pressure Check     1. Have you been to the ER, urgent care clinic since your last visit? Hospitalized since your last visit? No    2. Have you seen or consulted any other health care providers outside of the [address] [place] [address] since your last visit? Include any pap smears or colon screening.  No No